# Patient Record
Sex: MALE | Race: WHITE | Employment: FULL TIME | URBAN - METROPOLITAN AREA
[De-identification: names, ages, dates, MRNs, and addresses within clinical notes are randomized per-mention and may not be internally consistent; named-entity substitution may affect disease eponyms.]

---

## 2017-01-03 ENCOUNTER — OFFICE VISIT (OUTPATIENT)
Dept: FAMILY MEDICINE CLINIC | Facility: CLINIC | Age: 62
End: 2017-01-03

## 2017-01-03 VITALS
HEART RATE: 71 BPM | WEIGHT: 292 LBS | SYSTOLIC BLOOD PRESSURE: 122 MMHG | BODY MASS INDEX: 38 KG/M2 | DIASTOLIC BLOOD PRESSURE: 74 MMHG

## 2017-01-03 DIAGNOSIS — I47.1 SVT (SUPRAVENTRICULAR TACHYCARDIA) (HCC): Primary | ICD-10-CM

## 2017-01-03 PROCEDURE — 99213 OFFICE O/P EST LOW 20 MIN: CPT | Performed by: FAMILY MEDICINE

## 2017-01-03 PROCEDURE — 99212 OFFICE O/P EST SF 10 MIN: CPT | Performed by: FAMILY MEDICINE

## 2017-01-03 NOTE — PROGRESS NOTES
Blood pressure 122/74, pulse 71, weight 292 lb (132.45 kg). Patient presents today following up for hospitalization for SVT. Reports initially he felt like he was going to faint and his watch registered a pulse of 219.   He reports that he was hospitalize

## 2017-01-06 ENCOUNTER — PRIOR ORIGINAL RECORDS (OUTPATIENT)
Dept: OTHER | Age: 62
End: 2017-01-06

## 2017-01-09 ENCOUNTER — OFFICE VISIT (OUTPATIENT)
Dept: OTOLARYNGOLOGY | Facility: CLINIC | Age: 62
End: 2017-01-09

## 2017-01-09 VITALS
TEMPERATURE: 98 F | WEIGHT: 292 LBS | BODY MASS INDEX: 38.7 KG/M2 | DIASTOLIC BLOOD PRESSURE: 70 MMHG | HEIGHT: 73 IN | SYSTOLIC BLOOD PRESSURE: 124 MMHG

## 2017-01-09 DIAGNOSIS — G47.33 OBSTRUCTIVE SLEEP APNEA SYNDROME: Primary | ICD-10-CM

## 2017-01-09 PROCEDURE — 99212 OFFICE O/P EST SF 10 MIN: CPT | Performed by: OTOLARYNGOLOGY

## 2017-01-09 PROCEDURE — 99243 OFF/OP CNSLTJ NEW/EST LOW 30: CPT | Performed by: OTOLARYNGOLOGY

## 2017-01-10 ENCOUNTER — PATIENT MESSAGE (OUTPATIENT)
Dept: FAMILY MEDICINE CLINIC | Facility: CLINIC | Age: 62
End: 2017-01-10

## 2017-01-10 DIAGNOSIS — G47.30 SLEEP APNEA, UNSPECIFIED TYPE: Primary | ICD-10-CM

## 2017-01-10 NOTE — PATIENT INSTRUCTIONS
Continuous Positive Air Pressure (CPAP)  Continuous positive air pressure (CPAP) uses gentle air pressure to hold the airway open. CPAP is often the most effective treatment for sleep apnea and severe snoring. It works very well for many people.  But keep

## 2017-01-10 NOTE — PROGRESS NOTES
Valery Marks is a 64year old male. Patient presents with:  Obstructive Sleep Apnea (TANYA): Patient here for consult    HPI:   He has had problems with loud snoring for many years.  His wife has been telling him that he skins to struggle to breathe at [facility] - Normal.   Psychiatric Normal Orientation - Oriented to time, place, person & situation. Appropriate mood and affect. Lymph Detail Normal Submental. Submandibular. Anterior cervical. Posterior cervical. Supraclavicular.    Eyes Normal Conjunctiva - Right

## 2017-01-16 NOTE — TELEPHONE ENCOUNTER
From: Ara Granda  To: Ry Sloan DO  Sent: 1/10/2017 12:01 PM CST  Subject: Visit Follow-up Question    I saw Dr. Regan Soto and he recommended going back and giving [facility] machine a chance to work. I guess will give this a try.  What would be next st

## 2017-01-19 ENCOUNTER — PRIOR ORIGINAL RECORDS (OUTPATIENT)
Dept: OTHER | Age: 62
End: 2017-01-19

## 2017-01-19 LAB
CHOLESTEROL, TOTAL: 159 MG/DL
GLUCOSE: 106 MG/DL
HDL CHOLESTEROL: 56 MG/DL
LDL CHOLESTEROL: 86 MG/DL
NON-HDL CHOLESTEROL: 103 MG/DL
TRIGLYCERIDES: 86 MG/DL

## 2017-02-08 ENCOUNTER — TELEPHONE (OUTPATIENT)
Dept: FAMILY MEDICINE CLINIC | Facility: CLINIC | Age: 62
End: 2017-02-08

## 2017-02-09 NOTE — TELEPHONE ENCOUNTER
Spk to Lisbeth at [address] [place] [address]. Leanna Sierra is requesting results from CPAP and order for machine and mask. Information was faxed.

## 2017-02-21 ENCOUNTER — PRIOR ORIGINAL RECORDS (OUTPATIENT)
Dept: OTHER | Age: 62
End: 2017-02-21

## 2017-02-21 ENCOUNTER — LAB ENCOUNTER (OUTPATIENT)
Dept: LAB | Facility: HOSPITAL | Age: 62
End: 2017-02-21
Attending: INTERNAL MEDICINE
Payer: COMMERCIAL

## 2017-02-21 DIAGNOSIS — I47.1 SVT (SUPRAVENTRICULAR TACHYCARDIA) (HCC): ICD-10-CM

## 2017-02-21 LAB
ANION GAP SERPL CALC-SCNC: 10 MMOL/L (ref 0–18)
BASOPHILS # BLD: 0.1 K/UL (ref 0–0.2)
BASOPHILS NFR BLD: 1 %
BUN SERPL-MCNC: 17 MG/DL (ref 8–20)
BUN/CREAT SERPL: 19.8 (ref 10–20)
BUN: 17 MG/DL
CALCIUM SERPL-MCNC: 9.1 MG/DL (ref 8.5–10.5)
CALCIUM: 9.1 MG/DL
CHLORIDE SERPL-SCNC: 105 MMOL/L (ref 95–110)
CHLORIDE: 105 MEQ/L
CO2 SERPL-SCNC: 22 MMOL/L (ref 22–32)
CREAT SERPL-MCNC: 0.86 MG/DL (ref 0.5–1.5)
CREATININE, SERUM: 0.86 MG/DL
EOSINOPHIL # BLD: 0.1 K/UL (ref 0–0.7)
EOSINOPHIL NFR BLD: 1 %
ERYTHROCYTE [DISTWIDTH] IN BLOOD BY AUTOMATED COUNT: 13.8 % (ref 11–15)
GLUCOSE SERPL-MCNC: 86 MG/DL (ref 70–99)
GLUCOSE: 86 MG/DL
HCT VFR BLD AUTO: 42.6 % (ref 41–52)
HEMATOCRIT: 42.6 %
HEMOGLOBIN: 14.5 G/DL
HGB BLD-MCNC: 14.5 G/DL (ref 13.5–17.5)
LYMPHOCYTES # BLD: 1.9 K/UL (ref 1–4)
LYMPHOCYTES NFR BLD: 22 %
MCH RBC QN AUTO: 29.9 PG (ref 27–32)
MCHC RBC AUTO-ENTMCNC: 34 G/DL (ref 32–37)
MCV RBC AUTO: 87.9 FL (ref 80–100)
MONOCYTES # BLD: 0.6 K/UL (ref 0–1)
MONOCYTES NFR BLD: 7 %
NEUTROPHILS # BLD AUTO: 5.8 K/UL (ref 1.8–7.7)
NEUTROPHILS NFR BLD: 69 %
OSMOLALITY UR CALC.SUM OF ELEC: 285 MOSM/KG (ref 275–295)
PLATELET # BLD AUTO: 262 K/UL (ref 140–400)
PLATELETS: 262 K/UL
PMV BLD AUTO: 8.5 FL (ref 7.4–10.3)
POTASSIUM SERPL-SCNC: 3.8 MMOL/L (ref 3.3–5.1)
POTASSIUM, SERUM: 3.8 MEQ/L
RBC # BLD AUTO: 4.85 M/UL (ref 4.5–5.9)
RED BLOOD COUNT: 4.85 X 10-6/U
SODIUM SERPL-SCNC: 137 MMOL/L (ref 136–144)
SODIUM: 137 MEQ/L
WBC # BLD AUTO: 8.4 K/UL (ref 4–11)
WHITE BLOOD COUNT: 8.4 X 10-3/U

## 2017-02-21 PROCEDURE — 85025 COMPLETE CBC W/AUTO DIFF WBC: CPT

## 2017-02-21 PROCEDURE — 36415 COLL VENOUS BLD VENIPUNCTURE: CPT

## 2017-02-21 PROCEDURE — 80048 BASIC METABOLIC PNL TOTAL CA: CPT

## 2017-02-22 RX ORDER — SODIUM CHLORIDE 9 MG/ML
INJECTION, SOLUTION INTRAVENOUS ONCE
Status: COMPLETED | OUTPATIENT
Start: 2017-02-23 | End: 2017-02-23

## 2017-02-23 ENCOUNTER — HOSPITAL ENCOUNTER (OUTPATIENT)
Dept: INTERVENTIONAL RADIOLOGY/VASCULAR | Facility: HOSPITAL | Age: 62
Discharge: HOME | End: 2017-02-23
Attending: INTERNAL MEDICINE | Admitting: INTERNAL MEDICINE
Payer: COMMERCIAL

## 2017-02-23 VITALS
DIASTOLIC BLOOD PRESSURE: 62 MMHG | RESPIRATION RATE: 21 BRPM | HEIGHT: 73 IN | HEART RATE: 88 BPM | OXYGEN SATURATION: 93 % | BODY MASS INDEX: 37.77 KG/M2 | SYSTOLIC BLOOD PRESSURE: 104 MMHG | WEIGHT: 285 LBS

## 2017-02-23 DIAGNOSIS — I47.1 SVT (SUPRAVENTRICULAR TACHYCARDIA) (HCC): ICD-10-CM

## 2017-02-23 PROCEDURE — 93609 INTRA-VNTR MAPG TCHYCAR SITE: CPT

## 2017-02-23 PROCEDURE — 4A023FZ MEASUREMENT OF CARDIAC RHYTHM, PERCUTANEOUS APPROACH: ICD-10-PCS | Performed by: INTERNAL MEDICINE

## 2017-02-23 PROCEDURE — 4A0234Z MEASUREMENT OF CARDIAC ELECTRICAL ACTIVITY, PERCUTANEOUS APPROACH: ICD-10-PCS | Performed by: INTERNAL MEDICINE

## 2017-02-23 PROCEDURE — 93653 COMPRE EP EVAL TX SVT: CPT

## 2017-02-23 PROCEDURE — 93623 PRGRMD STIMJ&PACG IV RX NFS: CPT

## 2017-02-23 PROCEDURE — 93621 COMP EP EVL L PAC&REC C SINS: CPT

## 2017-02-23 RX ORDER — SODIUM CHLORIDE 9 MG/ML
INJECTION, SOLUTION INTRAVENOUS
Status: COMPLETED
Start: 2017-02-23 | End: 2017-02-23

## 2017-02-23 RX ORDER — MIDAZOLAM HYDROCHLORIDE 1 MG/ML
INJECTION INTRAMUSCULAR; INTRAVENOUS
Status: COMPLETED
Start: 2017-02-23 | End: 2017-02-23

## 2017-02-23 RX ORDER — LIDOCAINE HYDROCHLORIDE 20 MG/ML
INJECTION, SOLUTION EPIDURAL; INFILTRATION; INTRACAUDAL; PERINEURAL
Status: COMPLETED
Start: 2017-02-23 | End: 2017-02-23

## 2017-02-23 RX ORDER — ISOPROTERENOL HYDROCHLORIDE 0.2 MG/ML
INJECTION, SOLUTION INTRAMUSCULAR; INTRAVENOUS
Status: COMPLETED
Start: 2017-02-23 | End: 2017-02-23

## 2017-02-23 RX ADMIN — SODIUM CHLORIDE: 9 INJECTION, SOLUTION INTRAVENOUS at 07:27:00

## 2017-02-23 NOTE — PROCEDURES
Procedures performed:  1. Comprehensive EP study  2. CS recording  3. EP testing following isuprel infusion. 4. Mapping, point by point  5.  RFA of SVT    : Xiomy Wilburn MD    Indication: Symptomatic SVT    Complication: none  EBL: Minimal  Sedation 270.    After isuprel washout:  Similar SCL,  SC, QRS and QT. AH 85 ms, HV 45 ms. AV node  ms    AV node /500 ms    VA BCL was 240.     SVT was not inducible post ablation, post ablation testing was performed with isuprel infusion and d

## 2017-02-27 ENCOUNTER — PATIENT MESSAGE (OUTPATIENT)
Dept: FAMILY MEDICINE CLINIC | Facility: CLINIC | Age: 62
End: 2017-02-27

## 2017-02-27 DIAGNOSIS — G47.33 OBSTRUCTIVE SLEEP APNEA SYNDROME: Primary | ICD-10-CM

## 2017-02-27 NOTE — TELEPHONE ENCOUNTER
From: Nela Sim  To: [place]ail[place] Cancer, DO  Sent: 2/27/2017 12:29 PM CST  Subject: Other    I have been using Cpap machine for a few weeks now with not so good results.  I have been communicating with consultant assigned to my case and she said I woul

## 2017-02-27 NOTE — TELEPHONE ENCOUNTER
Dr. [facility], please see message below. Would you like to see pt for follow up? Please advise. Thanks.

## 2017-02-28 NOTE — TELEPHONE ENCOUNTER
Please have patient see the pulmonology group for his sleep apnea.   I don't order the autotitrating machines, as I'm not a sleep specialist.

## 2017-03-14 ENCOUNTER — PATIENT MESSAGE (OUTPATIENT)
Dept: FAMILY MEDICINE CLINIC | Facility: CLINIC | Age: 62
End: 2017-03-14

## 2017-03-14 DIAGNOSIS — I47.1 SUPRAVENTRICULAR TACHYCARDIA (HCC): Primary | ICD-10-CM

## 2017-03-16 ENCOUNTER — PRIOR ORIGINAL RECORDS (OUTPATIENT)
Dept: OTHER | Age: 62
End: 2017-03-16

## 2017-03-16 ENCOUNTER — MYAURORA ACCOUNT LINK (OUTPATIENT)
Dept: OTHER | Age: 62
End: 2017-03-16

## 2017-03-18 NOTE — TELEPHONE ENCOUNTER
From: Bianca Adame  To: Simran Traylor DO  Sent: 3/14/2017 12:36 PM CDT  Subject: Visit Follow-up Question    I have a follow up Dr. appointment with Dr. Aquilino Walker for the procedure performed Feb 23. Do I need a referral from your office?

## 2017-03-20 ENCOUNTER — HOSPITAL ENCOUNTER (OUTPATIENT)
Age: 62
Discharge: HOME | End: 2017-03-20
Attending: EMERGENCY MEDICINE
Payer: COMMERCIAL

## 2017-03-20 ENCOUNTER — APPOINTMENT (OUTPATIENT)
Dept: GENERAL RADIOLOGY | Age: 62
End: 2017-03-20
Attending: EMERGENCY MEDICINE
Payer: COMMERCIAL

## 2017-03-20 ENCOUNTER — PATIENT MESSAGE (OUTPATIENT)
Dept: FAMILY MEDICINE CLINIC | Facility: CLINIC | Age: 62
End: 2017-03-20

## 2017-03-20 VITALS
RESPIRATION RATE: 20 BRPM | OXYGEN SATURATION: 100 % | HEIGHT: 73 IN | BODY MASS INDEX: 37.77 KG/M2 | DIASTOLIC BLOOD PRESSURE: 75 MMHG | SYSTOLIC BLOOD PRESSURE: 133 MMHG | WEIGHT: 285 LBS | HEART RATE: 91 BPM | TEMPERATURE: 100 F

## 2017-03-20 DIAGNOSIS — J40 BRONCHITIS: Primary | ICD-10-CM

## 2017-03-20 PROCEDURE — 99213 OFFICE O/P EST LOW 20 MIN: CPT

## 2017-03-20 PROCEDURE — 99214 OFFICE O/P EST MOD 30 MIN: CPT

## 2017-03-20 PROCEDURE — 71020 XR CHEST PA + LAT CHEST (CPT=71020): CPT

## 2017-03-20 RX ORDER — BENZONATATE 100 MG/1
100 CAPSULE ORAL 3 TIMES DAILY PRN
Qty: 21 CAPSULE | Refills: 0 | Status: SHIPPED | OUTPATIENT
Start: 2017-03-20 | End: 2017-03-20

## 2017-03-20 RX ORDER — BENZONATATE 100 MG/1
100 CAPSULE ORAL 3 TIMES DAILY PRN
Qty: 21 CAPSULE | Refills: 0 | Status: SHIPPED | OUTPATIENT
Start: 2017-03-20 | End: 2017-04-03

## 2017-03-20 RX ORDER — AZITHROMYCIN 250 MG/1
TABLET, FILM COATED ORAL
Qty: 1 PACKAGE | Refills: 0 | Status: SHIPPED | OUTPATIENT
Start: 2017-03-20 | End: 2017-03-27

## 2017-03-20 NOTE — ED PROVIDER NOTES
Patient presents with:  Cough/URI      HPI:     Cassy Napier is a 64year old male who presents for evaluation of a chief complaint of cough and rhinorrhea. Onset of symptoms was 3 days ago.   Patient denies fever or sputum production chest pain or back Placed This Encounter  XR CHEST PA + LAT CHEST (CPT=71020) Once  Order Specific Question: What is the Relevant Clinical Indication / Reason for Exam?  Answer: cough  azithromycin (ZITHROMAX Z-LENNY) 250 MG Oral Tab   Sig: Take as directed   Dispense:  1 Pack

## 2017-03-20 NOTE — ED NOTES
Patient c/o cough for 2 days, now with a runny nose and headache from coughing. Patient denies sore throat or fever. LS clear.

## 2017-03-27 ENCOUNTER — HOSPITAL ENCOUNTER (OUTPATIENT)
Age: 62
Discharge: HOME | End: 2017-03-27
Attending: EMERGENCY MEDICINE
Payer: COMMERCIAL

## 2017-03-27 VITALS
OXYGEN SATURATION: 100 % | RESPIRATION RATE: 16 BRPM | HEART RATE: 82 BPM | TEMPERATURE: 98 F | WEIGHT: 285 LBS | BODY MASS INDEX: 37.77 KG/M2 | DIASTOLIC BLOOD PRESSURE: 77 MMHG | HEIGHT: 73 IN | SYSTOLIC BLOOD PRESSURE: 138 MMHG

## 2017-03-27 DIAGNOSIS — J40 BRONCHITIS: Primary | ICD-10-CM

## 2017-03-27 PROCEDURE — 99214 OFFICE O/P EST MOD 30 MIN: CPT

## 2017-03-27 PROCEDURE — 99213 OFFICE O/P EST LOW 20 MIN: CPT

## 2017-03-27 RX ORDER — BENZONATATE 100 MG/1
100 CAPSULE ORAL 3 TIMES DAILY PRN
Qty: 21 CAPSULE | Refills: 0 | Status: SHIPPED | OUTPATIENT
Start: 2017-03-27 | End: 2017-04-17

## 2017-03-27 RX ORDER — PREDNISONE 20 MG/1
40 TABLET ORAL DAILY
Qty: 10 TABLET | Refills: 0 | Status: SHIPPED | OUTPATIENT
Start: 2017-03-27 | End: 2017-04-01

## 2017-03-27 NOTE — ED PROVIDER NOTES
CC:Cough    HPI:     Antonio Kumar is a 64year old male who presents for evaluation of a chief complaint of a cough. Onset of symptoms was over 1 week ago. The patient also complains of mild diarrhea.   Care prior to arrival consisted of: A course of Zit impairment      MDM/Assessment/Plan:   Orders for this encounter: At this time there did not appear to be a strong indication to give a repeat course of antibiotic.   We will plan on placing the patient a course of steroids and assess for response advised p

## 2017-03-28 NOTE — TELEPHONE ENCOUNTER
From: Mattie Goodson  To: Brenton Licona DO  Sent: 3/20/2017 12:38 PM CDT  Subject: Non-Urgent Medical Question    Probably need to make  appointment for a bad cough cold. I can't stop coughing.  Cell phone [phone]

## 2017-03-28 NOTE — TELEPHONE ENCOUNTER
Dr [facility] [place],    See pts' mychart message. This pt went to [address] [place] [address] on 3/27/17 for persisted dry cough with minimal gray phelgm and ears popping.  Dx with bronchitis and currently taking prednisone 20 mg 2 tablets daily for 5 days and benzonatate 100 mg one cap

## 2017-04-03 ENCOUNTER — OFFICE VISIT (OUTPATIENT)
Dept: FAMILY MEDICINE CLINIC | Facility: CLINIC | Age: 62
End: 2017-04-03

## 2017-04-03 ENCOUNTER — TELEPHONE (OUTPATIENT)
Dept: FAMILY MEDICINE CLINIC | Facility: CLINIC | Age: 62
End: 2017-04-03

## 2017-04-03 VITALS
HEART RATE: 73 BPM | DIASTOLIC BLOOD PRESSURE: 84 MMHG | WEIGHT: 287 LBS | BODY MASS INDEX: 38.04 KG/M2 | HEIGHT: 73 IN | RESPIRATION RATE: 20 BRPM | SYSTOLIC BLOOD PRESSURE: 135 MMHG | TEMPERATURE: 98 F

## 2017-04-03 DIAGNOSIS — M10.9 ACUTE GOUT INVOLVING TOE OF LEFT FOOT, UNSPECIFIED CAUSE: Primary | ICD-10-CM

## 2017-04-03 PROCEDURE — 99213 OFFICE O/P EST LOW 20 MIN: CPT | Performed by: FAMILY MEDICINE

## 2017-04-03 PROCEDURE — 94640 AIRWAY INHALATION TREATMENT: CPT | Performed by: FAMILY MEDICINE

## 2017-04-03 RX ORDER — ALBUTEROL SULFATE 2.5 MG/3ML
2.5 SOLUTION RESPIRATORY (INHALATION) EVERY 4 HOURS PRN
Status: DISCONTINUED | OUTPATIENT
Start: 2017-04-03 | End: 2017-04-04

## 2017-04-03 RX ORDER — OMEPRAZOLE 40 MG/1
40 CAPSULE, DELAYED RELEASE ORAL DAILY
Qty: 30 CAPSULE | Refills: 0 | Status: SHIPPED | OUTPATIENT
Start: 2017-04-03 | End: 2017-08-31

## 2017-04-03 RX ORDER — AZELASTINE HCL 205.5 UG/1
SPRAY NASAL
Qty: 1 EACH | Refills: 1 | Status: SHIPPED | OUTPATIENT
Start: 2017-04-03 | End: 2020-10-15

## 2017-04-03 RX ORDER — ALBUTEROL SULFATE 2.5 MG/3ML
2.5 SOLUTION RESPIRATORY (INHALATION) EVERY 4 HOURS PRN
Qty: 1 BOTTLE | Refills: 3 | Status: SHIPPED | OUTPATIENT
Start: 2017-04-03 | End: 2017-04-04

## 2017-04-03 RX ORDER — INDOMETHACIN 75 MG/1
75 CAPSULE, EXTENDED RELEASE ORAL 2 TIMES DAILY WITH MEALS
Qty: 60 CAPSULE | Refills: 1 | Status: SHIPPED | OUTPATIENT
Start: 2017-04-03 | End: 2017-08-31 | Stop reason: ALTCHOICE

## 2017-04-03 RX ADMIN — ALBUTEROL SULFATE 2.5 MG: 2.5 SOLUTION RESPIRATORY (INHALATION) at 15:25:00

## 2017-04-03 NOTE — PROGRESS NOTES
Where: nasal congestion and cough  Quality (Constant/Sharp?): sharp  Severity: mild mod pain  Duration: 3 weeks. Timing: constant  When does it occur: day and night.   Modifying factors:   Associated signs symptoms: cough   cxr normal  Physical exam  The

## 2017-04-03 NOTE — TELEPHONE ENCOUNTER
Pt spouse calling regarding pt getting the wrong medication at the pharmacy. Spouse state that pt was suppose to get an in hailer but receive a solution. .. please advise

## 2017-04-04 RX ORDER — ALBUTEROL SULFATE 90 UG/1
2 AEROSOL, METERED RESPIRATORY (INHALATION) EVERY 4 HOURS PRN
Qty: 1 INHALER | Refills: 0 | Status: SHIPPED | OUTPATIENT
Start: 2017-04-04 | End: 2017-08-31 | Stop reason: ALTCHOICE

## 2017-04-04 NOTE — TELEPHONE ENCOUNTER
Dr [facility], please see message below and advise. You sent albuterol sulfate (2.5 MG/3ML) 0.083% Inhalation Nebu Soln yesterday; spouse states was supposed to be an inhaler?

## 2017-08-31 ENCOUNTER — OFFICE VISIT (OUTPATIENT)
Dept: FAMILY MEDICINE CLINIC | Facility: CLINIC | Age: 62
End: 2017-08-31

## 2017-08-31 VITALS
HEIGHT: 73 IN | HEART RATE: 76 BPM | SYSTOLIC BLOOD PRESSURE: 123 MMHG | BODY MASS INDEX: 38.83 KG/M2 | WEIGHT: 293 LBS | DIASTOLIC BLOOD PRESSURE: 76 MMHG

## 2017-08-31 DIAGNOSIS — G47.33 OBSTRUCTIVE SLEEP APNEA SYNDROME: ICD-10-CM

## 2017-08-31 DIAGNOSIS — I10 ESSENTIAL HYPERTENSION WITH GOAL BLOOD PRESSURE LESS THAN 130/80: ICD-10-CM

## 2017-08-31 DIAGNOSIS — Z23 NEED FOR VACCINATION: Primary | ICD-10-CM

## 2017-08-31 DIAGNOSIS — Z00.00 ANNUAL PHYSICAL EXAM: ICD-10-CM

## 2017-08-31 DIAGNOSIS — M10.9 ACUTE GOUT INVOLVING TOE OF LEFT FOOT, UNSPECIFIED CAUSE: ICD-10-CM

## 2017-08-31 DIAGNOSIS — Z86.79 HISTORY OF CARDIAC ARRHYTHMIA: ICD-10-CM

## 2017-08-31 DIAGNOSIS — E78.5 HYPERLIPIDEMIA, UNSPECIFIED HYPERLIPIDEMIA TYPE: ICD-10-CM

## 2017-08-31 PROCEDURE — 99396 PREV VISIT EST AGE 40-64: CPT | Performed by: FAMILY MEDICINE

## 2017-08-31 PROCEDURE — 90715 TDAP VACCINE 7 YRS/> IM: CPT | Performed by: FAMILY MEDICINE

## 2017-08-31 PROCEDURE — 90471 IMMUNIZATION ADMIN: CPT | Performed by: FAMILY MEDICINE

## 2017-08-31 NOTE — PROGRESS NOTES
Physical  india claustrophobic with mask      Patient's past medical surgical family social history was reviewed.     Review of Systems  Allergic: no environmental allergies or food allergies  Cardiovascular: no chest pain, irregular heartbeat, or palpitation DIPHTHERIA TOXOIDS AND ACELLULAR PERTUSIS VACCINE (TDAP), >7 YEARS, IM USE  - ZOSTER VACC LIVE SUBQ NJX    4. Hyperlipidemia, unspecified hyperlipidemia type  stable    5. Acute gout involving toe of left foot, unspecified cause  resolved    6.  Obstructive

## 2018-02-02 RX ORDER — TELMISARTAN 40 MG/1
TABLET ORAL
Qty: 90 TABLET | Refills: 0 | Status: SHIPPED | OUTPATIENT
Start: 2018-02-02 | End: 2018-05-02

## 2018-02-02 NOTE — TELEPHONE ENCOUNTER
Refilled per written protocol.     Hypertensive Medications  Protocol Criteria:  · Appointment scheduled in the past 6 months or in the next 3 months  · BMP or CMP in the past 12 months  · Creatinine result < 2  Recent Outpatient Visits            5 months

## 2018-02-23 ENCOUNTER — NURSE TRIAGE (OUTPATIENT)
Dept: OTHER | Age: 63
End: 2018-02-23

## 2018-02-23 NOTE — TELEPHONE ENCOUNTER
Spoke with patient and informed him of provider's orders and plan of care. Patient was also instructed to see medical care in [place] if his symptoms get worse. Patient voiced understanding and agreed with plan of care.

## 2018-02-23 NOTE — TELEPHONE ENCOUNTER
Action Requested: Summary for Provider     []  Critical Lab, Recommendations Needed  [x] Need Additional Advice  []   FYI    []   Need Orders  [x] Need Medications Sent to Pharmacy  []  Other     SUMMARY: Dr. [facility]: please advise, patient request tessalon pe

## 2018-02-27 ENCOUNTER — OFFICE VISIT (OUTPATIENT)
Dept: FAMILY MEDICINE CLINIC | Facility: CLINIC | Age: 63
End: 2018-02-27

## 2018-02-27 VITALS
TEMPERATURE: 98 F | HEIGHT: 73 IN | WEIGHT: 290 LBS | SYSTOLIC BLOOD PRESSURE: 138 MMHG | BODY MASS INDEX: 38.43 KG/M2 | HEART RATE: 75 BPM | DIASTOLIC BLOOD PRESSURE: 89 MMHG

## 2018-02-27 DIAGNOSIS — R05.9 COUGH: Primary | ICD-10-CM

## 2018-02-27 DIAGNOSIS — K21.9 CHRONIC GERD: ICD-10-CM

## 2018-02-27 PROCEDURE — 99213 OFFICE O/P EST LOW 20 MIN: CPT | Performed by: FAMILY MEDICINE

## 2018-02-27 PROCEDURE — 99212 OFFICE O/P EST SF 10 MIN: CPT | Performed by: FAMILY MEDICINE

## 2018-02-27 RX ORDER — LANSOPRAZOLE 30 MG/1
30 CAPSULE, DELAYED RELEASE ORAL
Refills: 0 | COMMUNITY
Start: 2018-02-19 | End: 2020-10-15 | Stop reason: ALTCHOICE

## 2018-02-27 RX ORDER — FLUTICASONE PROPIONATE 50 MCG
2 SPRAY, SUSPENSION (ML) NASAL DAILY
Qty: 1 BOTTLE | Refills: 3 | Status: SHIPPED | OUTPATIENT
Start: 2018-02-27 | End: 2019-02-22

## 2018-02-27 RX ORDER — CODEINE PHOSPHATE AND GUAIFENESIN 10; 100 MG/5ML; MG/5ML
SOLUTION ORAL
Refills: 0 | COMMUNITY
Start: 2018-02-19 | End: 2020-02-10 | Stop reason: ALTCHOICE

## 2018-02-27 NOTE — PROGRESS NOTES
Blood pressure 138/89, pulse 75, temperature 98.4 °F (36.9 °C), temperature source Oral, height 6' 1\" (1.854 m), weight 290 lb (131.5 kg). 10 day history of cough that is nocturnal clear nasal congestion some green. Coughing of green to clear phlegm.

## 2018-05-02 NOTE — TELEPHONE ENCOUNTER
Please review and advise regarding pended refill request as unable to refill per protocol since no recent relevant labs.     Hypertensive Medications  Protocol Criteria:  · Appointment scheduled in the past 6 months or in the next 3 months  · BMP or CMP in

## 2018-05-03 RX ORDER — TELMISARTAN 40 MG/1
TABLET ORAL
Qty: 90 TABLET | Refills: 11 | Status: SHIPPED | OUTPATIENT
Start: 2018-05-03 | End: 2019-05-30

## 2018-05-14 ENCOUNTER — APPOINTMENT (OUTPATIENT)
Dept: GENERAL RADIOLOGY | Age: 63
End: 2018-05-14
Attending: PHYSICIAN ASSISTANT
Payer: COMMERCIAL

## 2018-05-14 ENCOUNTER — HOSPITAL ENCOUNTER (OUTPATIENT)
Age: 63
Discharge: HOME | End: 2018-05-14
Payer: COMMERCIAL

## 2018-05-14 VITALS
WEIGHT: 290 LBS | DIASTOLIC BLOOD PRESSURE: 79 MMHG | OXYGEN SATURATION: 100 % | SYSTOLIC BLOOD PRESSURE: 131 MMHG | HEART RATE: 65 BPM | HEIGHT: 73 IN | BODY MASS INDEX: 38.43 KG/M2 | RESPIRATION RATE: 20 BRPM | TEMPERATURE: 98 F

## 2018-05-14 DIAGNOSIS — J40 BRONCHITIS: Primary | ICD-10-CM

## 2018-05-14 PROCEDURE — 99214 OFFICE O/P EST MOD 30 MIN: CPT

## 2018-05-14 PROCEDURE — 71046 X-RAY EXAM CHEST 2 VIEWS: CPT | Performed by: PHYSICIAN ASSISTANT

## 2018-05-14 PROCEDURE — 99213 OFFICE O/P EST LOW 20 MIN: CPT

## 2018-05-14 RX ORDER — BENZONATATE 200 MG/1
200 CAPSULE ORAL 3 TIMES DAILY PRN
Qty: 21 CAPSULE | Refills: 0 | Status: SHIPPED | OUTPATIENT
Start: 2018-05-14 | End: 2020-02-10 | Stop reason: ALTCHOICE

## 2018-05-14 RX ORDER — AZITHROMYCIN 250 MG/1
TABLET, FILM COATED ORAL
Qty: 1 PACKAGE | Refills: 0 | Status: SHIPPED | OUTPATIENT
Start: 2018-05-14 | End: 2018-05-19

## 2018-05-14 NOTE — ED PROVIDER NOTES
[facility]    History   Patient presents with:  Cough/URI    Stated Complaint: COUGH/CONGESTION    HPI    Patient is a 70-year-old obese male with a history of hypertension who presents for evaluation of (Oral)   Resp 20   Ht 185.4 cm (6' 1\")   Wt 131.5 kg   SpO2 100%   BMI 38.26 kg/m²         Physical Exam   Constitutional: He is oriented to person, place, and time. He appears well-developed and well-nourished.    HENT:   Head: Normocephalic and atraumati persist        Medications Prescribed:  Current Discharge Medication List    START taking these medications    azithromycin (ZITHROMAX Z-LENNY) 250 MG Oral Tab  500 mg once followed by 250 mg daily x 4 days  Qty: 1 Package Refills: 0    benzonatate 200 MG Or

## 2018-10-08 ENCOUNTER — HOSPITAL ENCOUNTER (OUTPATIENT)
Age: 63
Discharge: HOME | End: 2018-10-08
Payer: COMMERCIAL

## 2018-10-08 VITALS
WEIGHT: 280 LBS | OXYGEN SATURATION: 97 % | SYSTOLIC BLOOD PRESSURE: 142 MMHG | HEART RATE: 68 BPM | TEMPERATURE: 98 F | DIASTOLIC BLOOD PRESSURE: 80 MMHG | HEIGHT: 73 IN | BODY MASS INDEX: 37.11 KG/M2 | RESPIRATION RATE: 18 BRPM

## 2018-10-08 DIAGNOSIS — H10.31 ACUTE CONJUNCTIVITIS OF RIGHT EYE, UNSPECIFIED ACUTE CONJUNCTIVITIS TYPE: Primary | ICD-10-CM

## 2018-10-08 PROCEDURE — 90471 IMMUNIZATION ADMIN: CPT

## 2018-10-08 PROCEDURE — 90686 IIV4 VACC NO PRSV 0.5 ML IM: CPT

## 2018-10-08 PROCEDURE — 99214 OFFICE O/P EST MOD 30 MIN: CPT

## 2018-10-08 PROCEDURE — 99213 OFFICE O/P EST LOW 20 MIN: CPT

## 2018-10-08 RX ORDER — ERYTHROMYCIN 5 MG/G
1 OINTMENT OPHTHALMIC EVERY 6 HOURS
Qty: 1 G | Refills: 0 | Status: SHIPPED | OUTPATIENT
Start: 2018-10-08 | End: 2018-10-15

## 2018-10-08 NOTE — ED PROVIDER NOTES
Patient Seen in: [facility]    History   Patient presents with:   Eye Visual Problem (opthalmic)    Stated Complaint: eye problem    HPI    Patient is a 19-year-old male who presents for evaluation of right eye redness and Pulse 68   Temp 98.2 °F (36.8 °C) (Oral)   Resp 18   Ht 185.4 cm (6' 1\")   Wt 127 kg   SpO2 97%   BMI 36.94 kg/m²     Right Eye Chart Acuity: 20/30, Uncorrected  Left Eye Chart Acuity: 20/30, Uncorrected    Physical Exam   Constitutional: He is oriented worsen. All questions answered prior to discharge. Patient understands and agrees with plan and disposition. Given good ER precautions for any worsening symptoms.         Disposition and Plan     Clinical Impression:  Acute conjunctivitis of right eye, u

## 2019-03-01 VITALS
HEART RATE: 91 BPM | HEIGHT: 73 IN | DIASTOLIC BLOOD PRESSURE: 72 MMHG | OXYGEN SATURATION: 97 % | SYSTOLIC BLOOD PRESSURE: 134 MMHG | BODY MASS INDEX: 38.04 KG/M2 | RESPIRATION RATE: 8 BRPM | WEIGHT: 287 LBS

## 2019-03-01 VITALS
SYSTOLIC BLOOD PRESSURE: 130 MMHG | OXYGEN SATURATION: 97 % | HEART RATE: 84 BPM | HEIGHT: 73 IN | BODY MASS INDEX: 38.7 KG/M2 | DIASTOLIC BLOOD PRESSURE: 72 MMHG | WEIGHT: 292 LBS | RESPIRATION RATE: 8 BRPM

## 2019-05-30 RX ORDER — TELMISARTAN 40 MG/1
TABLET ORAL
Qty: 90 TABLET | Refills: 1 | Status: SHIPPED | OUTPATIENT
Start: 2019-05-30 | End: 2019-06-13

## 2019-05-30 NOTE — TELEPHONE ENCOUNTER
Please review; protocol failed.     Requested Prescriptions     Pending Prescriptions Disp Refills   • TELMISARTAN 40 MG Oral Tab [Pharmacy Med Name: TELMISARTAN 40MG TABLETS] 90 tablet 0     Sig: TAKE 1 TABLET(40 MG) BY MOUTH DAILY         Recent Visits  D

## 2019-06-13 ENCOUNTER — OFFICE VISIT (OUTPATIENT)
Dept: FAMILY MEDICINE CLINIC | Facility: CLINIC | Age: 64
End: 2019-06-13

## 2019-06-13 VITALS
RESPIRATION RATE: 20 BRPM | HEIGHT: 73 IN | BODY MASS INDEX: 38.7 KG/M2 | SYSTOLIC BLOOD PRESSURE: 133 MMHG | HEART RATE: 84 BPM | DIASTOLIC BLOOD PRESSURE: 82 MMHG | WEIGHT: 292 LBS

## 2019-06-13 DIAGNOSIS — Z86.79 H/O SUPRAVENTRICULAR TACHYCARDIA: ICD-10-CM

## 2019-06-13 DIAGNOSIS — E78.00 HIGH CHOLESTEROL: ICD-10-CM

## 2019-06-13 DIAGNOSIS — L57.0 SOLAR KERATOSIS: ICD-10-CM

## 2019-06-13 DIAGNOSIS — I10 ESSENTIAL HYPERTENSION WITH GOAL BLOOD PRESSURE LESS THAN 130/80: ICD-10-CM

## 2019-06-13 DIAGNOSIS — M10.9 GOUT, UNSPECIFIED CAUSE, UNSPECIFIED CHRONICITY, UNSPECIFIED SITE: ICD-10-CM

## 2019-06-13 DIAGNOSIS — D18.01 CAPILLARY HEMANGIOMA OF SKIN: ICD-10-CM

## 2019-06-13 DIAGNOSIS — E66.01 CLASS 2 SEVERE OBESITY DUE TO EXCESS CALORIES WITH SERIOUS COMORBIDITY AND BODY MASS INDEX (BMI) OF 38.0 TO 38.9 IN ADULT (HCC): ICD-10-CM

## 2019-06-13 DIAGNOSIS — Z00.00 ANNUAL PHYSICAL EXAM: Primary | ICD-10-CM

## 2019-06-13 DIAGNOSIS — Z12.5 SCREENING FOR PROSTATE CANCER: ICD-10-CM

## 2019-06-13 DIAGNOSIS — Z12.11 SCREEN FOR COLON CANCER: ICD-10-CM

## 2019-06-13 DIAGNOSIS — K57.90 DIVERTICULOSIS: ICD-10-CM

## 2019-06-13 DIAGNOSIS — G47.33 OSA (OBSTRUCTIVE SLEEP APNEA): ICD-10-CM

## 2019-06-13 PROCEDURE — 99396 PREV VISIT EST AGE 40-64: CPT | Performed by: FAMILY MEDICINE

## 2019-06-13 RX ORDER — TELMISARTAN 80 MG/1
80 TABLET ORAL DAILY
Qty: 90 TABLET | Refills: 3 | Status: SHIPPED | OUTPATIENT
Start: 2019-06-13 | End: 2020-10-12

## 2019-06-13 RX ORDER — ROSUVASTATIN CALCIUM 5 MG/1
5 TABLET, COATED ORAL NIGHTLY
Qty: 90 TABLET | Refills: 3 | Status: SHIPPED | OUTPATIENT
Start: 2019-06-13 | End: 2019-07-15

## 2019-06-13 NOTE — PROGRESS NOTES
contolled svt  No problems  REASON FOR VISIT:    Georgia Walters is a 59year old male who presents for an [address]       Patient Active Problem List:     Arthralgia of left hip     SVT (supraventricular tachycardia) (HCC)     Hypokalemia Screen pts at high risk plus screen one time for adults born [address] No results found for: HCVAB   Tuberculosis Screen If high risk No components found for: [address] Internal Lab or Procedure     Annual Monitoring of heart ablation       Family History   Problem Relation Age of Onset   • Cancer Father         Brain   • Cancer Daughter         sarcoma      SOCIAL HISTORY:   Social History    Tobacco Use      Smoking status: Never Smoker      Smokeless tobacco: Never Use CONDITIONS:   Joaquim Motley is a 59year old male who presents for an [address]. PLAN SUMMARY:   1. Annual physical exam  Stable  Colon due 2024  - Telmisartan 80 MG Oral Tab; Take 1 tablet (80 mg total) by mouth daily.   Dispense: 90 Influenza Annually   Pneumococcal if high risk   Td/Tdap once then every 10 years   HPV Males 11-21   Zoster (Shingles) 60 and older: one dose   Varicella 2 doses if not immune   MMR 1-2 doses if born after 1956 and not immune

## 2019-07-11 ENCOUNTER — OFFICE VISIT (OUTPATIENT)
Dept: DERMATOLOGY CLINIC | Facility: CLINIC | Age: 64
End: 2019-07-11

## 2019-07-11 DIAGNOSIS — D22.9 MULTIPLE NEVI: Primary | ICD-10-CM

## 2019-07-11 DIAGNOSIS — D23.4 BENIGN NEOPLASM OF SCALP AND SKIN OF NECK: ICD-10-CM

## 2019-07-11 DIAGNOSIS — D23.30 BENIGN NEOPLASM OF SKIN OF FACE: ICD-10-CM

## 2019-07-11 DIAGNOSIS — D23.70 BENIGN NEOPLASM OF SKIN OF LOWER LIMB, INCLUDING HIP, UNSPECIFIED LATERALITY: ICD-10-CM

## 2019-07-11 DIAGNOSIS — D23.60 BENIGN NEOPLASM OF SKIN OF UPPER LIMB, INCLUDING SHOULDER, UNSPECIFIED LATERALITY: ICD-10-CM

## 2019-07-11 DIAGNOSIS — L82.1 SEBORRHEIC KERATOSES: ICD-10-CM

## 2019-07-11 DIAGNOSIS — D23.5 BENIGN NEOPLASM OF SKIN OF TRUNK, EXCEPT SCROTUM: ICD-10-CM

## 2019-07-11 PROCEDURE — 99203 OFFICE O/P NEW LOW 30 MIN: CPT | Performed by: DERMATOLOGY

## 2019-07-12 ENCOUNTER — LAB ENCOUNTER (OUTPATIENT)
Dept: LAB | Age: 64
End: 2019-07-12
Attending: FAMILY MEDICINE
Payer: COMMERCIAL

## 2019-07-12 DIAGNOSIS — Z12.5 SCREENING FOR PROSTATE CANCER: ICD-10-CM

## 2019-07-12 DIAGNOSIS — Z86.79 H/O SUPRAVENTRICULAR TACHYCARDIA: Primary | ICD-10-CM

## 2019-07-12 DIAGNOSIS — I10 ESSENTIAL HYPERTENSION WITH GOAL BLOOD PRESSURE LESS THAN 130/80: ICD-10-CM

## 2019-07-12 DIAGNOSIS — Z00.00 ANNUAL PHYSICAL EXAM: ICD-10-CM

## 2019-07-12 DIAGNOSIS — M10.9 GOUT, UNSPECIFIED CAUSE, UNSPECIFIED CHRONICITY, UNSPECIFIED SITE: ICD-10-CM

## 2019-07-12 LAB
ABSOLUTE IMMATURE GRANULOCYTES (OFFPRE24): NORMAL
ALBUMIN SERPL-MCNC: 3.9 G/DL (ref 3.4–5)
ALBUMIN SERPL-MCNC: NORMAL G/DL
ALBUMIN/GLOB SERPL: 1.3 {RATIO} (ref 1–2)
ALBUMIN/GLOB SERPL: NORMAL {RATIO}
ALP LIVER SERPL-CCNC: 64 U/L (ref 45–117)
ALP SERPL-CCNC: NORMAL U/L
ALT SERPL-CCNC: 34 U/L
ALT SERPL-CCNC: 34 U/L (ref 16–61)
ANION GAP SERPL CALC-SCNC: 7 MMOL/L (ref 0–18)
ANION GAP SERPL CALC-SCNC: NORMAL MMOL/L
AST SERPL-CCNC: 14 U/L
AST SERPL-CCNC: 14 U/L (ref 15–37)
BASO+EOS+MONOS # BLD: NORMAL 10*3/UL
BASO+EOS+MONOS NFR BLD: NORMAL %
BASOPHILS # BLD AUTO: 0.06 X10(3) UL (ref 0–0.2)
BASOPHILS # BLD: NORMAL 10*3/UL
BASOPHILS NFR BLD AUTO: 1.1 %
BASOPHILS NFR BLD: NORMAL %
BILIRUB SERPL-MCNC: 0.6 MG/DL (ref 0.1–2)
BILIRUB SERPL-MCNC: NORMAL MG/DL
BUN BLD-MCNC: 18 MG/DL (ref 7–18)
BUN SERPL-MCNC: 18 MG/DL
BUN/CREAT SERPL: 21.7
BUN/CREAT SERPL: 21.7 (ref 10–20)
CALCIUM BLD-MCNC: 8.8 MG/DL (ref 8.5–10.1)
CALCIUM SERPL-MCNC: 8.8 MG/DL
CHLORIDE SERPL-SCNC: 108 MMOL/L
CHLORIDE SERPL-SCNC: 108 MMOL/L (ref 98–112)
CHOLEST SERPL-MCNC: 196 MG/DL
CHOLEST SMN-MCNC: 196 MG/DL (ref ?–200)
CHOLEST/HDLC SERPL: NORMAL {RATIO}
CO2 SERPL-SCNC: 25 MMOL/L
CO2 SERPL-SCNC: 25 MMOL/L (ref 21–32)
COMPLEXED PSA SERPL-MCNC: 1.94 NG/ML (ref ?–4)
CREAT BLD-MCNC: 0.83 MG/DL (ref 0.7–1.3)
CREAT SERPL-MCNC: 0.83 MG/DL
DEPRECATED RDW RBC AUTO: 44.6 FL (ref 35.1–46.3)
DIFFERENTIAL METHOD BLD: NORMAL
EOSINOPHIL # BLD AUTO: 0.12 X10(3) UL (ref 0–0.7)
EOSINOPHIL # BLD: NORMAL 10*3/UL
EOSINOPHIL NFR BLD AUTO: 2.3 %
EOSINOPHIL NFR BLD: NORMAL %
ERYTHROCYTE [DISTWIDTH] IN BLOOD BY AUTOMATED COUNT: 13.2 % (ref 11–15)
ERYTHROCYTE [DISTWIDTH] IN BLOOD: NORMAL %
GLOBULIN PLAS-MCNC: 3.1 G/DL (ref 2.8–4.4)
GLOBULIN SER-MCNC: NORMAL G/DL
GLUCOSE BLD-MCNC: 85 MG/DL (ref 70–99)
GLUCOSE SERPL-MCNC: 85 MG/DL
HCT VFR BLD AUTO: 44.4 % (ref 39–53)
HCT VFR BLD CALC: 44.4 %
HDLC SERPL-MCNC: 32 MG/DL
HDLC SERPL-MCNC: 32 MG/DL (ref 40–59)
HGB BLD-MCNC: 15.1 G/DL
HGB BLD-MCNC: 15.1 G/DL (ref 13–17.5)
IMM GRANULOCYTES # BLD AUTO: 0.03 X10(3) UL (ref 0–1)
IMM GRANULOCYTES NFR BLD: 0.6 %
IMMATURE GRANULOCYTES (OFFPRE25): NORMAL
LDLC SERPL CALC-MCNC: 126 MG/DL
LDLC SERPL CALC-MCNC: 126 MG/DL (ref ?–100)
LENGTH OF FAST TIME PATIENT: NORMAL H
LENGTH OF FAST TIME PATIENT: NORMAL H
LYMPHOCYTES # BLD AUTO: 1.59 X10(3) UL (ref 1–4)
LYMPHOCYTES # BLD: NORMAL 10*3/UL
LYMPHOCYTES NFR BLD AUTO: 30.3 %
LYMPHOCYTES NFR BLD: NORMAL %
M PROTEIN MFR SERPL ELPH: 7 G/DL (ref 6.4–8.2)
MCH RBC QN AUTO: 31.1 PG (ref 26–34)
MCH RBC QN AUTO: NORMAL PG
MCHC RBC AUTO-ENTMCNC: 34 G/DL (ref 31–37)
MCHC RBC AUTO-ENTMCNC: NORMAL G/DL
MCV RBC AUTO: 91.5 FL (ref 80–100)
MCV RBC AUTO: NORMAL FL
MONOCYTES # BLD AUTO: 0.4 X10(3) UL (ref 0.1–1)
MONOCYTES # BLD: NORMAL 10*3/UL
MONOCYTES NFR BLD AUTO: 7.6 %
MONOCYTES NFR BLD: NORMAL %
MPV (OFFPRE2): NORMAL
NEUTROPHILS # BLD AUTO: 3.04 X10 (3) UL (ref 1.5–7.7)
NEUTROPHILS # BLD AUTO: 3.04 X10(3) UL (ref 1.5–7.7)
NEUTROPHILS # BLD: NORMAL 10*3/UL
NEUTROPHILS NFR BLD AUTO: 58.1 %
NEUTROPHILS NFR BLD: NORMAL %
NONHDLC SERPL-MCNC: 164 MG/DL
NONHDLC SERPL-MCNC: 164 MG/DL (ref ?–130)
NRBC BLD MANUAL-RTO: NORMAL %
OSMOLALITY SERPL CALC.SUM OF ELEC: 291 MOSM/KG (ref 275–295)
PATIENT FASTING: YES
PATIENT FASTING: YES
PLAT MORPH BLD: NORMAL
PLATELET # BLD AUTO: 252 10(3)UL (ref 150–450)
PLATELET # BLD: 252 10*3/UL
POTASSIUM SERPL-SCNC: 4.1 MMOL/L
POTASSIUM SERPL-SCNC: 4.1 MMOL/L (ref 3.5–5.1)
PROT SERPL-MCNC: NORMAL G/DL
RBC # BLD AUTO: 4.85 X10(6)UL (ref 4.3–5.7)
RBC # BLD: 4.85 10*6/UL
RBC MORPH BLD: NORMAL
SODIUM SERPL-SCNC: 140 MMOL/L
SODIUM SERPL-SCNC: 140 MMOL/L (ref 136–145)
TRIGL SERPL-MCNC: 189 MG/DL
TRIGL SERPL-MCNC: 189 MG/DL (ref 30–149)
TSH SERPL-ACNC: 1.39 M[IU]/L
TSI SER-ACNC: 1.39 MIU/ML (ref 0.36–3.74)
URATE SERPL-MCNC: 6.9 MG/DL (ref 3.5–7.2)
VLDLC SERPL CALC-MCNC: 38 MG/DL
VLDLC SERPL CALC-MCNC: 38 MG/DL (ref 0–30)
WBC # BLD AUTO: 5.2 X10(3) UL (ref 4–11)
WBC # BLD: 5.2 10*3/UL
WBC MORPH BLD: NORMAL

## 2019-07-12 PROCEDURE — 93005 ELECTROCARDIOGRAM TRACING: CPT

## 2019-07-12 PROCEDURE — 84550 ASSAY OF BLOOD/URIC ACID: CPT

## 2019-07-12 PROCEDURE — 85025 COMPLETE CBC W/AUTO DIFF WBC: CPT

## 2019-07-12 PROCEDURE — 80061 LIPID PANEL: CPT

## 2019-07-12 PROCEDURE — 84443 ASSAY THYROID STIM HORMONE: CPT

## 2019-07-12 PROCEDURE — 80053 COMPREHEN METABOLIC PANEL: CPT

## 2019-07-12 PROCEDURE — 36415 COLL VENOUS BLD VENIPUNCTURE: CPT

## 2019-07-12 PROCEDURE — 93010 ELECTROCARDIOGRAM REPORT: CPT | Performed by: FAMILY MEDICINE

## 2019-07-15 RX ORDER — ROSUVASTATIN CALCIUM 20 MG/1
20 TABLET, COATED ORAL NIGHTLY
Qty: 90 TABLET | Refills: 0 | Status: SHIPPED | OUTPATIENT
Start: 2019-07-15 | End: 2019-09-13

## 2019-07-22 NOTE — PROGRESS NOTES
Shola Hendrix is a 59year old male. HPI:     CC:  Patient presents with:  Derm Problem: New pt. pt presenting today with upper body check. Family HX of SCC,Melanoma(father and brother).         Allergies:  Pravastatin    HISTORY:    Past Medical Histo tachycardia) ([facility] [address]    • Unspecified essential hypertension      Past Surgical History:   Procedure Laterality Date   • OTHER SURGICAL HISTORY  02/17    heart ablation      Social History    Socioeconomic History      Marital status:       Spouse nam for this visit. HPI:    Patient presents with:  Derm Problem: New pt. pt presenting today with upper body check. Family HX of SCC,Melanoma(father and brother). Family history of skin cancer SCC and melanoma in father and brother.   Patient has not had a limb, including hip, unspecified laterality  Benign neoplasm of skin of trunk, except scrotum  Benign neoplasm of skin of upper limb, including shoulder, unspecified laterality  Seborrheic keratoses    See details on map.       Remarkable for:    Medium bro

## 2019-08-02 ENCOUNTER — APPOINTMENT (OUTPATIENT)
Dept: CARDIOLOGY | Age: 64
End: 2019-08-02

## 2019-08-06 ENCOUNTER — CLINICAL ABSTRACT (OUTPATIENT)
Dept: CARDIOLOGY | Age: 64
End: 2019-08-06

## 2019-08-06 RX ORDER — LANSOPRAZOLE 30 MG/1
30 CAPSULE, DELAYED RELEASE ORAL DAILY
COMMUNITY
Start: 2018-02-19 | End: 2022-03-11 | Stop reason: ALTCHOICE

## 2019-08-06 RX ORDER — FLUTICASONE PROPIONATE AND SALMETEROL XINAFOATE 230; 21 UG/1; UG/1
2 AEROSOL, METERED RESPIRATORY (INHALATION) 2 TIMES DAILY
COMMUNITY
Start: 2018-02-27

## 2019-08-06 RX ORDER — AZELASTINE HCL 205.5 UG/1
2 SPRAY NASAL 2 TIMES DAILY
COMMUNITY
Start: 2017-04-03

## 2019-08-06 RX ORDER — TELMISARTAN 80 MG/1
80 TABLET ORAL DAILY
COMMUNITY
Start: 2019-06-13

## 2019-08-06 RX ORDER — ROSUVASTATIN CALCIUM 20 MG/1
20 TABLET, COATED ORAL NIGHTLY
COMMUNITY
Start: 2019-07-15 | End: 2022-03-11 | Stop reason: ALTCHOICE

## 2019-08-06 RX ORDER — ASPIRIN 81 MG/1
81 TABLET, CHEWABLE ORAL DAILY
COMMUNITY
Start: 2016-12-24 | End: 2022-03-11 | Stop reason: ALTCHOICE

## 2019-08-07 ENCOUNTER — PATIENT MESSAGE (OUTPATIENT)
Dept: FAMILY MEDICINE CLINIC | Facility: CLINIC | Age: 64
End: 2019-08-07

## 2019-08-07 ENCOUNTER — NURSE TRIAGE (OUTPATIENT)
Dept: OTHER | Age: 64
End: 2019-08-07

## 2019-08-07 ENCOUNTER — HOSPITAL ENCOUNTER (OUTPATIENT)
Age: 64
Discharge: HOME | End: 2019-08-07
Payer: COMMERCIAL

## 2019-08-07 ENCOUNTER — OFFICE VISIT (OUTPATIENT)
Dept: CARDIOLOGY | Age: 64
End: 2019-08-07

## 2019-08-07 VITALS
RESPIRATION RATE: 20 BRPM | OXYGEN SATURATION: 98 % | HEIGHT: 73 IN | DIASTOLIC BLOOD PRESSURE: 75 MMHG | TEMPERATURE: 99 F | WEIGHT: 295 LBS | SYSTOLIC BLOOD PRESSURE: 119 MMHG | HEART RATE: 88 BPM | BODY MASS INDEX: 39.1 KG/M2

## 2019-08-07 VITALS
OXYGEN SATURATION: 96 % | SYSTOLIC BLOOD PRESSURE: 116 MMHG | BODY MASS INDEX: 39.23 KG/M2 | DIASTOLIC BLOOD PRESSURE: 64 MMHG | WEIGHT: 296 LBS | HEIGHT: 73 IN | HEART RATE: 69 BPM

## 2019-08-07 DIAGNOSIS — I47.10 PSVT (PAROXYSMAL SUPRAVENTRICULAR TACHYCARDIA): ICD-10-CM

## 2019-08-07 DIAGNOSIS — I10 ESSENTIAL HYPERTENSION: Primary | ICD-10-CM

## 2019-08-07 DIAGNOSIS — H65.91 RIGHT NON-SUPPURATIVE OTITIS MEDIA: Primary | ICD-10-CM

## 2019-08-07 DIAGNOSIS — H60.501 ACUTE OTITIS EXTERNA OF RIGHT EAR, UNSPECIFIED TYPE: ICD-10-CM

## 2019-08-07 PROCEDURE — 3078F DIAST BP <80 MM HG: CPT | Performed by: INTERNAL MEDICINE

## 2019-08-07 PROCEDURE — 99214 OFFICE O/P EST MOD 30 MIN: CPT

## 2019-08-07 PROCEDURE — 99214 OFFICE O/P EST MOD 30 MIN: CPT | Performed by: INTERNAL MEDICINE

## 2019-08-07 PROCEDURE — 93000 ELECTROCARDIOGRAM COMPLETE: CPT | Performed by: INTERNAL MEDICINE

## 2019-08-07 PROCEDURE — 3074F SYST BP LT 130 MM HG: CPT | Performed by: INTERNAL MEDICINE

## 2019-08-07 PROCEDURE — 99213 OFFICE O/P EST LOW 20 MIN: CPT

## 2019-08-07 RX ORDER — AMOXICILLIN 875 MG/1
875 TABLET, COATED ORAL 2 TIMES DAILY
Qty: 20 TABLET | Refills: 0 | Status: SHIPPED | OUTPATIENT
Start: 2019-08-07 | End: 2019-08-17

## 2019-08-07 RX ORDER — OFLOXACIN 3 MG/ML
10 SOLUTION AURICULAR (OTIC) DAILY
Qty: 1 BOTTLE | Refills: 0 | Status: SHIPPED | OUTPATIENT
Start: 2019-08-07 | End: 2019-08-14

## 2019-08-07 ASSESSMENT — PATIENT HEALTH QUESTIONNAIRE - PHQ9
SUM OF ALL RESPONSES TO PHQ9 QUESTIONS 1 AND 2: 0
SUM OF ALL RESPONSES TO PHQ9 QUESTIONS 1 AND 2: 0
1. LITTLE INTEREST OR PLEASURE IN DOING THINGS: NOT AT ALL
2. FEELING DOWN, DEPRESSED OR HOPELESS: NOT AT ALL

## 2019-08-07 NOTE — TELEPHONE ENCOUNTER
Action Requested: Summary for Provider     []  Critical Lab, Recommendations Needed  [] Need Additional Advice  []   FYI    []   Need Orders  [] Need Medications Sent to Pharmacy  []  Other     SUMMARY: OV scheduled with Dr Bolivar Sheehan for further evaluatio

## 2019-08-07 NOTE — TELEPHONE ENCOUNTER
----- Message from Georgia Walters sent at 8/7/2019  7:13 AM CDT -----  Regarding: Non-Urgent Medical Question  Contact: [phone]  I’m having a problem with right inner ear having fluid causing hearing problems. Do I need to make an appointment.  Had p

## 2019-08-07 NOTE — TELEPHONE ENCOUNTER
From: Joanna Matthews  To: Izabella Montes De Oca DO  Sent: 8/7/2019 7:13 AM CDT  Subject: Non-Urgent Medical Question    I’m having a problem with right inner ear having fluid causing hearing problems. Do I need to make an appointment.  Had problem for a cou

## 2019-08-07 NOTE — ED INITIAL ASSESSMENT (HPI)
Pt to IC with pain and \"clogged sensation\" in right ear x 2 weeks. Denies fever. States pain worse today.

## 2019-08-07 NOTE — ED PROVIDER NOTES
Patient presents with:  Ear Problem Pain (neurosensory)      HPI:     Kenny Ruth is a 59year old male who presents with a chief complaint of right ear pain for the past couple weeks that started after an upper respiratory virus.   Patient states he on file        Gets together: Not on file        Attends [employer] service: Not on file        Active member of club or organization: Not on file        Attends meetings of clubs or organizations: Not on file        Relationship status: Not on file      In Tab          Sig: Take 1 tablet (875 mg total) by mouth 2 (two) times daily for 10 days. Dispense:  20 tablet          Refill:  0      ofloxacin 0.3 % Otic Solution          Sig: Place 10 drops into the right ear daily for 7 days.           Lavinia Robledo

## 2019-08-08 PROBLEM — I47.10 PSVT (PAROXYSMAL SUPRAVENTRICULAR TACHYCARDIA): Status: ACTIVE | Noted: 2019-08-08

## 2019-08-13 DIAGNOSIS — I47.10 PSVT (PAROXYSMAL SUPRAVENTRICULAR TACHYCARDIA): ICD-10-CM

## 2019-08-13 DIAGNOSIS — I10 ESSENTIAL HYPERTENSION: Primary | ICD-10-CM

## 2019-08-15 DIAGNOSIS — I47.10 PSVT (PAROXYSMAL SUPRAVENTRICULAR TACHYCARDIA): Primary | ICD-10-CM

## 2019-08-17 ENCOUNTER — HOSPITAL ENCOUNTER (OUTPATIENT)
Age: 64
Discharge: HOME | End: 2019-08-17
Payer: COMMERCIAL

## 2019-08-17 VITALS
SYSTOLIC BLOOD PRESSURE: 126 MMHG | BODY MASS INDEX: 39 KG/M2 | OXYGEN SATURATION: 98 % | HEART RATE: 64 BPM | DIASTOLIC BLOOD PRESSURE: 77 MMHG | WEIGHT: 295 LBS | TEMPERATURE: 98 F | RESPIRATION RATE: 18 BRPM

## 2019-08-17 DIAGNOSIS — H10.31 ACUTE BACTERIAL CONJUNCTIVITIS OF RIGHT EYE: Primary | ICD-10-CM

## 2019-08-17 PROCEDURE — 99213 OFFICE O/P EST LOW 20 MIN: CPT

## 2019-08-17 RX ORDER — POLYMYXIN B SULFATE AND TRIMETHOPRIM 1; 10000 MG/ML; [USP'U]/ML
1 SOLUTION OPHTHALMIC
Qty: 10 ML | Refills: 0 | Status: SHIPPED | OUTPATIENT
Start: 2019-08-17 | End: 2019-08-22

## 2019-08-17 NOTE — ED PROVIDER NOTES
Patient presents with:  Conjunctivitis      HPI:     Faith López is a 59year old male with a past history of hyperlipidemia and hypertension presents with a chief complaint of right eye redness, itching and drainage.   Patient reports initially sympt reviewed and discussed with patient. See AVS for detailed discharge instructions for your condition today.     Follow Up with:  James Terrazas DO  [address] [facility]  [phone]

## 2019-08-17 NOTE — ED INITIAL ASSESSMENT (HPI)
PATIENT ARRIVED AMBULATORY TO ROOM C/O RIGHT EYE REDNESS/IRRITATION. PATIENT STATES \"I THOUGHT IT WAS BECAUSE I PET THE DOG AND RUBBED MY EYE BUT NOW THERE IS CRUSTY DRAINAGE\" NO INJURY TO EYE. NO FEVERS. NO CONTACT LENS USE.  NO VISION CHANGES T-piece resuscitator

## 2019-09-12 NOTE — TELEPHONE ENCOUNTER
REFILL ON THE FOLLOWING      Current Outpatient Medications:  Rosuvastatin Calcium 20 MG Oral Tab Take 1 tablet (20 mg total) by mouth nightly.  Disp: 90 tablet Rfl: 0

## 2019-09-13 RX ORDER — ROSUVASTATIN CALCIUM 20 MG/1
20 TABLET, COATED ORAL NIGHTLY
Qty: 90 TABLET | Refills: 1 | Status: SHIPPED | OUTPATIENT
Start: 2019-09-13 | End: 2020-10-15

## 2019-09-13 NOTE — TELEPHONE ENCOUNTER
Refill passed per [facility], [facility] protocol.   Cholesterol Medications  Protocol Criteria:  · Appointment scheduled in the past 12 months or in the next 3 months  · ALT & LDL on file in the past 12 months  · ALT result < 80  · LDL result <130   Recent Outpat

## 2019-09-13 NOTE — TELEPHONE ENCOUNTER
Refill passed per [facility], [facility] protocol.   Passed refill Severe reaction unable to refill

## 2019-10-21 ENCOUNTER — IMMUNIZATION (OUTPATIENT)
Dept: FAMILY MEDICINE CLINIC | Facility: CLINIC | Age: 64
End: 2019-10-21

## 2019-10-21 DIAGNOSIS — Z23 NEED FOR VACCINATION: ICD-10-CM

## 2019-10-21 PROCEDURE — 90471 IMMUNIZATION ADMIN: CPT | Performed by: FAMILY MEDICINE

## 2019-10-21 PROCEDURE — 90686 IIV4 VACC NO PRSV 0.5 ML IM: CPT | Performed by: FAMILY MEDICINE

## 2020-02-10 ENCOUNTER — OFFICE VISIT (OUTPATIENT)
Dept: FAMILY MEDICINE CLINIC | Facility: CLINIC | Age: 65
End: 2020-02-10

## 2020-02-10 VITALS
DIASTOLIC BLOOD PRESSURE: 72 MMHG | HEART RATE: 73 BPM | HEIGHT: 73 IN | RESPIRATION RATE: 20 BRPM | SYSTOLIC BLOOD PRESSURE: 106 MMHG | WEIGHT: 291 LBS | BODY MASS INDEX: 38.57 KG/M2

## 2020-02-10 DIAGNOSIS — E78.00 HIGH CHOLESTEROL: ICD-10-CM

## 2020-02-10 DIAGNOSIS — G47.33 OSA (OBSTRUCTIVE SLEEP APNEA): ICD-10-CM

## 2020-02-10 DIAGNOSIS — Z86.79 HISTORY OF CARDIAC ARRHYTHMIA: ICD-10-CM

## 2020-02-10 DIAGNOSIS — I10 ESSENTIAL HYPERTENSION WITH GOAL BLOOD PRESSURE LESS THAN 130/80: Primary | ICD-10-CM

## 2020-02-10 DIAGNOSIS — E66.01 CLASS 2 SEVERE OBESITY DUE TO EXCESS CALORIES WITH SERIOUS COMORBIDITY AND BODY MASS INDEX (BMI) OF 38.0 TO 38.9 IN ADULT (HCC): ICD-10-CM

## 2020-02-10 PROCEDURE — 99214 OFFICE O/P EST MOD 30 MIN: CPT | Performed by: FAMILY MEDICINE

## 2020-02-10 NOTE — PROGRESS NOTES
Feels well   No palpitations  Reviewed Dr. Conrad Barraza note  nosob       Patient's past medical surgical family social history was reviewed.     Review of Systems  Allergic: no environmental allergies or food allergies  Cardiovascular: no chest pain, irregular

## 2020-03-04 ENCOUNTER — HOSPITAL ENCOUNTER (OUTPATIENT)
Age: 65
Discharge: HOME | End: 2020-03-04
Attending: EMERGENCY MEDICINE
Payer: COMMERCIAL

## 2020-03-04 VITALS
SYSTOLIC BLOOD PRESSURE: 127 MMHG | DIASTOLIC BLOOD PRESSURE: 68 MMHG | TEMPERATURE: 98 F | HEART RATE: 63 BPM | WEIGHT: 288 LBS | HEIGHT: 73 IN | OXYGEN SATURATION: 98 % | RESPIRATION RATE: 17 BRPM | BODY MASS INDEX: 38.17 KG/M2

## 2020-03-04 DIAGNOSIS — M25.562 ACUTE PAIN OF LEFT KNEE: Primary | ICD-10-CM

## 2020-03-04 PROCEDURE — 99214 OFFICE O/P EST MOD 30 MIN: CPT

## 2020-03-04 PROCEDURE — 99213 OFFICE O/P EST LOW 20 MIN: CPT

## 2020-03-04 RX ORDER — INDOMETHACIN 50 MG/1
50 CAPSULE ORAL
Qty: 15 CAPSULE | Refills: 0 | Status: SHIPPED | OUTPATIENT
Start: 2020-03-04 | End: 2020-03-09

## 2020-03-04 NOTE — ED INITIAL ASSESSMENT (HPI)
C/o \"flare up\" of gout to left knee starting yesterday. No fever. No injury. Mild redness to area. + swelling and warm to touch. + distal CMS.

## 2020-03-04 NOTE — ED PROVIDER NOTES
Patient Seen in: [address]      History   Patient presents with:  Gout    Stated Complaint: left knee gout    HPI    60-year-old male who states he has a history of gouty arthritis that recurrence in his left knee.   He t Oropharynx is clear and moist.   Eyes: Conjunctivae and EOM are normal. Pupils are equal, round, and reactive to light. Neck: Neck supple. Cardiovascular: Normal rate, regular rhythm, normal heart sounds and intact distal pulses.     Pulmonary/Chest: Ef

## 2020-07-28 ENCOUNTER — OFFICE VISIT (OUTPATIENT)
Dept: FAMILY MEDICINE CLINIC | Facility: CLINIC | Age: 65
End: 2020-07-28

## 2020-07-28 VITALS
DIASTOLIC BLOOD PRESSURE: 77 MMHG | WEIGHT: 290 LBS | BODY MASS INDEX: 38.43 KG/M2 | HEART RATE: 71 BPM | HEIGHT: 73 IN | SYSTOLIC BLOOD PRESSURE: 133 MMHG

## 2020-07-28 DIAGNOSIS — Z00.00 ROUTINE PHYSICAL EXAMINATION: Primary | ICD-10-CM

## 2020-07-28 DIAGNOSIS — E78.00 HIGH CHOLESTEROL: ICD-10-CM

## 2020-07-28 DIAGNOSIS — I10 ESSENTIAL HYPERTENSION WITH GOAL BLOOD PRESSURE LESS THAN 130/80: ICD-10-CM

## 2020-07-28 PROCEDURE — 3075F SYST BP GE 130 - 139MM HG: CPT | Performed by: FAMILY MEDICINE

## 2020-07-28 PROCEDURE — 3008F BODY MASS INDEX DOCD: CPT | Performed by: FAMILY MEDICINE

## 2020-07-28 PROCEDURE — 99213 OFFICE O/P EST LOW 20 MIN: CPT | Performed by: FAMILY MEDICINE

## 2020-07-28 PROCEDURE — 3078F DIAST BP <80 MM HG: CPT | Performed by: FAMILY MEDICINE

## 2020-07-28 PROCEDURE — 99397 PER PM REEVAL EST PAT 65+ YR: CPT | Performed by: FAMILY MEDICINE

## 2020-07-28 PROCEDURE — 90732 PPSV23 VACC 2 YRS+ SUBQ/IM: CPT | Performed by: FAMILY MEDICINE

## 2020-07-28 PROCEDURE — 90471 IMMUNIZATION ADMIN: CPT | Performed by: FAMILY MEDICINE

## 2020-07-28 NOTE — PROGRESS NOTES
Blood pressure 133/77, pulse 71, height 6' 1\" (1.854 m), weight 290 lb (131.5 kg). REASON FOR VISIT:    Padmini Pfeiffer is a 72year old male who presents for an [address].         Patient Active Problem List:     Arthralgia of left hip Internal Lab or Procedure     Annual Monitoring of Persistent Medications  (ACE/ARB, Digoxin, Diuretics)        Potassium  Annually Potassium (mmol/L)   Date Value   07/12/2019 4.1     POTASSIUM (P) (mmol/L)   Date Value   11/19/2015 3.9       Creatinine GENERAL: feels well otherwise  SKIN: denies any unusual skin lesions  EYES: denies blurred vision or double vision  HEENT: denies nasal congestion, sinus pain or ST  LUNGS: denies shortness of breath with exertion  CARDIOVASCULAR: denies chest pain on ex PNEUMOCOCCAL IMM (PNEUMOVAX)       The patient indicates understanding of these issues and agrees to the plan. No follow-ups on file. Diet counseling perfomed    SUGGESTED VACCINATIONS - Influenza, Pneumococcal, Zoster, Tetanus, HPV   Influenza:  There ar

## 2020-10-12 ENCOUNTER — TELEPHONE (OUTPATIENT)
Dept: INTERNAL MEDICINE CLINIC | Facility: CLINIC | Age: 65
End: 2020-10-12

## 2020-10-12 DIAGNOSIS — Z00.00 ANNUAL PHYSICAL EXAM: ICD-10-CM

## 2020-10-12 RX ORDER — TELMISARTAN 80 MG/1
80 TABLET ORAL DAILY
Qty: 90 TABLET | Refills: 0 | Status: SHIPPED | OUTPATIENT
Start: 2020-10-12 | End: 2020-10-15

## 2020-10-15 ENCOUNTER — OFFICE VISIT (OUTPATIENT)
Dept: FAMILY MEDICINE CLINIC | Facility: CLINIC | Age: 65
End: 2020-10-15

## 2020-10-15 VITALS
HEART RATE: 64 BPM | BODY MASS INDEX: 38.83 KG/M2 | HEIGHT: 73 IN | DIASTOLIC BLOOD PRESSURE: 78 MMHG | SYSTOLIC BLOOD PRESSURE: 123 MMHG | WEIGHT: 293 LBS

## 2020-10-15 DIAGNOSIS — Z00.00 ANNUAL PHYSICAL EXAM: ICD-10-CM

## 2020-10-15 DIAGNOSIS — I10 ESSENTIAL HYPERTENSION WITH GOAL BLOOD PRESSURE LESS THAN 130/80: Primary | ICD-10-CM

## 2020-10-15 PROCEDURE — 90662 IIV NO PRSV INCREASED AG IM: CPT | Performed by: FAMILY MEDICINE

## 2020-10-15 PROCEDURE — 3074F SYST BP LT 130 MM HG: CPT | Performed by: FAMILY MEDICINE

## 2020-10-15 PROCEDURE — 3008F BODY MASS INDEX DOCD: CPT | Performed by: FAMILY MEDICINE

## 2020-10-15 PROCEDURE — 99213 OFFICE O/P EST LOW 20 MIN: CPT | Performed by: FAMILY MEDICINE

## 2020-10-15 PROCEDURE — 3078F DIAST BP <80 MM HG: CPT | Performed by: FAMILY MEDICINE

## 2020-10-15 PROCEDURE — 90471 IMMUNIZATION ADMIN: CPT | Performed by: FAMILY MEDICINE

## 2020-10-15 RX ORDER — TELMISARTAN 80 MG/1
80 TABLET ORAL DAILY
Qty: 90 TABLET | Refills: 0 | Status: SHIPPED | OUTPATIENT
Start: 2020-10-15 | End: 2021-07-01

## 2020-10-15 RX ORDER — ROSUVASTATIN CALCIUM 20 MG/1
20 TABLET, COATED ORAL NIGHTLY
Qty: 90 TABLET | Refills: 1 | Status: SHIPPED | OUTPATIENT
Start: 2020-10-15

## 2020-10-20 NOTE — TELEPHONE ENCOUNTER
The patient was called and informed. He stated she has been trying to do on Mychart and will try again.

## 2020-10-23 ENCOUNTER — EKG ENCOUNTER (OUTPATIENT)
Dept: LAB | Age: 65
End: 2020-10-23
Attending: FAMILY MEDICINE
Payer: COMMERCIAL

## 2020-10-23 ENCOUNTER — LAB ENCOUNTER (OUTPATIENT)
Dept: LAB | Age: 65
End: 2020-10-23
Attending: FAMILY MEDICINE
Payer: COMMERCIAL

## 2020-10-23 DIAGNOSIS — I10 ESSENTIAL HYPERTENSION WITH GOAL BLOOD PRESSURE LESS THAN 130/80: Primary | ICD-10-CM

## 2020-10-23 DIAGNOSIS — I10 ESSENTIAL HYPERTENSION WITH GOAL BLOOD PRESSURE LESS THAN 130/80: ICD-10-CM

## 2020-10-23 PROCEDURE — 36415 COLL VENOUS BLD VENIPUNCTURE: CPT

## 2020-10-23 PROCEDURE — 93005 ELECTROCARDIOGRAM TRACING: CPT

## 2020-10-23 PROCEDURE — 84443 ASSAY THYROID STIM HORMONE: CPT

## 2020-10-23 PROCEDURE — 93010 ELECTROCARDIOGRAM REPORT: CPT | Performed by: FAMILY MEDICINE

## 2020-10-23 PROCEDURE — 80053 COMPREHEN METABOLIC PANEL: CPT

## 2020-10-23 PROCEDURE — 80061 LIPID PANEL: CPT

## 2020-12-16 ENCOUNTER — NURSE TRIAGE (OUTPATIENT)
Dept: FAMILY MEDICINE CLINIC | Facility: CLINIC | Age: 65
End: 2020-12-16

## 2020-12-16 ENCOUNTER — LAB ENCOUNTER (OUTPATIENT)
Dept: LAB | Age: 65
End: 2020-12-16
Attending: PHYSICIAN ASSISTANT
Payer: COMMERCIAL

## 2020-12-16 ENCOUNTER — TELEMEDICINE (OUTPATIENT)
Dept: FAMILY MEDICINE CLINIC | Facility: CLINIC | Age: 65
End: 2020-12-16

## 2020-12-16 ENCOUNTER — TELEPHONE (OUTPATIENT)
Dept: FAMILY MEDICINE CLINIC | Facility: CLINIC | Age: 65
End: 2020-12-16

## 2020-12-16 DIAGNOSIS — J39.9 UPPER RESPIRATORY DISEASE: Primary | ICD-10-CM

## 2020-12-16 DIAGNOSIS — J39.9 UPPER RESPIRATORY DISEASE: ICD-10-CM

## 2020-12-16 PROCEDURE — 99213 OFFICE O/P EST LOW 20 MIN: CPT | Performed by: PHYSICIAN ASSISTANT

## 2020-12-16 NOTE — TELEPHONE ENCOUNTER
----- Message from Fazal Hopper sent at 12/16/2020  7:11 AM CST -----  Regarding: Non-Urgent Medical Question  Contact: [phone]  I woke up with symptoms of runny nose cough Headache. Should I get tested for COVID and were should I go?

## 2020-12-16 NOTE — TELEPHONE ENCOUNTER
Action Requested: Summary for Provider     []  Critical Lab, Recommendations Needed  [] Need Additional Advice  []   FYI    []   Need Orders  [] Need Medications Sent to Pharmacy  []  Other     SUMMARY:       Virtual appointment made for today 12/16/2020 [id number]

## 2020-12-16 NOTE — PROGRESS NOTES
HPI: HPI     I spoke to Carri Alexander via video call, verified date of birth, and discussed current concerns. Onset/duration of current symptoms: 2 days.     Common COVID-19 symptoms per CDC: CDC Clinical Guidance  • Fever:     Yes []     No [x] of children: Not on file      Years of education: Not on file      Highest education level: Not on file    Occupational History      Not on file    Social Needs      Financial resource strain: Not on file      Food insecurity        Worry: Not on file Gastrointestinal: Negative for nausea, vomiting, abdominal pain, diarrhea, constipation, blood in stool and abdominal distention. Skin: Negative for rash. Neurological: Positive for headaches. Negative for dizziness, weakness and numbness.      There

## 2020-12-21 ENCOUNTER — TELEPHONE (OUTPATIENT)
Dept: FAMILY MEDICINE CLINIC | Facility: CLINIC | Age: 65
End: 2020-12-21

## 2020-12-21 NOTE — TELEPHONE ENCOUNTER
Routed to the triage pool for RN follow-up.       ----- Message from Berna Boas sent at 12/21/2020  9:27 AM CST -----  Regarding: Prescription Question  Contact: [phone]  Need cough medicine for Covid-19 cough

## 2020-12-22 ENCOUNTER — TELEMEDICINE (OUTPATIENT)
Dept: FAMILY MEDICINE CLINIC | Facility: CLINIC | Age: 65
End: 2020-12-22

## 2020-12-22 DIAGNOSIS — R05.9 COUGH: Primary | ICD-10-CM

## 2020-12-22 PROCEDURE — 99213 OFFICE O/P EST LOW 20 MIN: CPT | Performed by: PHYSICIAN ASSISTANT

## 2020-12-22 RX ORDER — BENZONATATE 200 MG/1
200 CAPSULE ORAL 3 TIMES DAILY PRN
Qty: 30 CAPSULE | Refills: 0 | Status: SHIPPED | OUTPATIENT
Start: 2020-12-22 | End: 2021-01-11

## 2020-12-22 NOTE — TELEPHONE ENCOUNTER
Patient with cough stating it keeps him up at night, and coughs frequently through the day. Requesting cough medication be sent to [facility] in [place]. Patient denies shortness of breath, chest pain. Please advise.

## 2020-12-22 NOTE — PROGRESS NOTES
HPI: HPI    I spoke to [facility] via video call, verified date of birth, and discussed current concerns. Patient tested positive Covid 19 on 12/16/20. Patient states that his symptoms are getting better except cough.  Patient still has dry coug History      Not on file    Social Needs      Financial resource strain: Not on file      Food insecurity        Worry: Not on file        Inability: Not on file      Transportation needs        Medical: Not on file        Non-medical: Not on file    Tobac Neurological: Negative for dizziness, weakness, numbness and headaches. There were no vitals filed for this visit. There is no height or weight on file to calculate BMI.     Physical Exam:   Physical Exam    Constitutional: He is oriented to person

## 2020-12-23 ENCOUNTER — HOSPITAL ENCOUNTER (EMERGENCY)
Facility: HOSPITAL | Age: 65
Discharge: HOME | End: 2020-12-24
Attending: EMERGENCY MEDICINE
Payer: COMMERCIAL

## 2020-12-23 DIAGNOSIS — U07.1 COVID-19: Primary | ICD-10-CM

## 2020-12-23 PROCEDURE — 99283 EMERGENCY DEPT VISIT LOW MDM: CPT

## 2020-12-24 VITALS
BODY MASS INDEX: 37.11 KG/M2 | TEMPERATURE: 99 F | OXYGEN SATURATION: 94 % | HEART RATE: 96 BPM | DIASTOLIC BLOOD PRESSURE: 68 MMHG | SYSTOLIC BLOOD PRESSURE: 105 MMHG | WEIGHT: 280 LBS | HEIGHT: 73 IN | RESPIRATION RATE: 20 BRPM

## 2020-12-24 RX ORDER — ALBUTEROL SULFATE 90 UG/1
2 AEROSOL, METERED RESPIRATORY (INHALATION) EVERY 4 HOURS PRN
Qty: 1 INHALER | Refills: 0 | Status: SHIPPED | OUTPATIENT
Start: 2020-12-24 | End: 2021-01-08

## 2020-12-24 RX ORDER — CODEINE PHOSPHATE AND GUAIFENESIN 10; 100 MG/5ML; MG/5ML
5 SOLUTION ORAL 3 TIMES DAILY PRN
Qty: 150 ML | Refills: 0 | Status: SHIPPED | OUTPATIENT
Start: 2020-12-24 | End: 2020-12-29

## 2020-12-24 NOTE — ED INITIAL ASSESSMENT (HPI)
PT c/o progressively worsening cough, dx with covid last Wednesday. PT states he is not sob at this time.

## 2020-12-24 NOTE — ED PROVIDER NOTES
Patient Seen in: [facility] Emergency Department      History   Patient presents with:  Covid    Stated Complaint: covid, can't stop coughing    HPI    Patient is a 42-year-old male who presents with persistent cough.   He has been sick with Covid warm, dry, no rashes        ED Course   Labs Reviewed - No data to display      MDM   Pulse ox 96% RA, normal  Patient has a pulse ox at home and states his oxygen saturations have been normal.  Advised to return for pulse ox below 90% or shortness of cristal

## 2020-12-24 NOTE — ED NOTES
Discharge instructions reviewed with pt. Pt denies any further questions at this time. Pt understands to return to ED if symptoms worsen or if his pulse ox is less than 90%. Pt stable and ambulatory at discharge.

## 2020-12-24 NOTE — ED NOTES
Pt presents to ED for a worsening cough after being dx with COVID last week. Pt states he was prescribed cough meds but it has not seem to work for him. Pt denies shortness off breath or chest pain. Pt states he has a pulse ox at home.

## 2021-01-08 ENCOUNTER — VIRTUAL PHONE E/M (OUTPATIENT)
Dept: FAMILY MEDICINE CLINIC | Facility: CLINIC | Age: 66
End: 2021-01-08

## 2021-01-08 DIAGNOSIS — R05.9 COUGH: Primary | ICD-10-CM

## 2021-01-08 PROCEDURE — 99213 OFFICE O/P EST LOW 20 MIN: CPT | Performed by: FAMILY MEDICINE

## 2021-01-08 RX ORDER — ALBUTEROL SULFATE 90 UG/1
2 AEROSOL, METERED RESPIRATORY (INHALATION) EVERY 4 HOURS PRN
Qty: 1 INHALER | Refills: 1 | Status: SHIPPED | OUTPATIENT
Start: 2021-01-08 | End: 2021-02-07

## 2021-01-08 NOTE — PROGRESS NOTES
Virtual Telephone Check-In    Sangeeta Shirley verbally consents to a Virtual/Telephone Check-In visit on 01/08/21. Patient has been referred to the [facility] website at [email or web address] to review the yearly Consent to Treat document.     Patient und

## 2021-01-11 DIAGNOSIS — R05.9 COUGH: ICD-10-CM

## 2021-01-12 DIAGNOSIS — R05.9 COUGH: ICD-10-CM

## 2021-01-12 RX ORDER — BENZONATATE 200 MG/1
200 CAPSULE ORAL 3 TIMES DAILY PRN
Qty: 30 CAPSULE | Refills: 0 | Status: SHIPPED | OUTPATIENT
Start: 2021-01-12 | End: 2021-01-12

## 2021-01-12 RX ORDER — BENZONATATE 200 MG/1
200 CAPSULE ORAL 3 TIMES DAILY PRN
Qty: 30 CAPSULE | Refills: 0 | Status: SHIPPED | OUTPATIENT
Start: 2021-01-12 | End: 2021-07-08 | Stop reason: ALTCHOICE

## 2021-01-14 ENCOUNTER — HOSPITAL ENCOUNTER (OUTPATIENT)
Dept: GENERAL RADIOLOGY | Age: 66
Discharge: HOME | End: 2021-01-14
Attending: FAMILY MEDICINE
Payer: COMMERCIAL

## 2021-01-14 ENCOUNTER — OFFICE VISIT (OUTPATIENT)
Dept: FAMILY MEDICINE CLINIC | Facility: CLINIC | Age: 66
End: 2021-01-14

## 2021-01-14 VITALS
HEART RATE: 78 BPM | HEIGHT: 73 IN | WEIGHT: 288.56 LBS | DIASTOLIC BLOOD PRESSURE: 78 MMHG | BODY MASS INDEX: 38.24 KG/M2 | SYSTOLIC BLOOD PRESSURE: 131 MMHG

## 2021-01-14 DIAGNOSIS — M25.562 LEFT KNEE PAIN, UNSPECIFIED CHRONICITY: ICD-10-CM

## 2021-01-14 DIAGNOSIS — M25.562 LEFT KNEE PAIN, UNSPECIFIED CHRONICITY: Primary | ICD-10-CM

## 2021-01-14 PROCEDURE — 3008F BODY MASS INDEX DOCD: CPT | Performed by: FAMILY MEDICINE

## 2021-01-14 PROCEDURE — 3078F DIAST BP <80 MM HG: CPT | Performed by: FAMILY MEDICINE

## 2021-01-14 PROCEDURE — 99213 OFFICE O/P EST LOW 20 MIN: CPT | Performed by: FAMILY MEDICINE

## 2021-01-14 PROCEDURE — 3075F SYST BP GE 130 - 139MM HG: CPT | Performed by: FAMILY MEDICINE

## 2021-01-14 PROCEDURE — 73562 X-RAY EXAM OF KNEE 3: CPT | Performed by: FAMILY MEDICINE

## 2021-01-14 RX ORDER — TRIAMCINOLONE ACETONIDE 40 MG/ML
40 INJECTION, SUSPENSION INTRA-ARTICULAR; INTRAMUSCULAR ONCE
Status: DISCONTINUED | OUTPATIENT
Start: 2021-01-14 | End: 2021-10-04

## 2021-01-14 RX ORDER — HYDROCODONE BITARTRATE AND ACETAMINOPHEN 5; 325 MG/1; MG/1
TABLET ORAL
Qty: 40 TABLET | Refills: 0 | Status: SHIPPED | OUTPATIENT
Start: 2021-01-14 | End: 2021-10-04

## 2021-01-14 NOTE — PROCEDURES
Informed consent obtained all questions answered    Aseptic technique employed for knee aspiration and injection    Ethyl chloride for topical anesthesia    Kenalog 40 mg/ML x1 mL and lidocaine 1% x 5 mL    Patient tolerated well    No fluid aspirated

## 2021-01-14 NOTE — PROGRESS NOTES
Blood pressure 131/78, pulse 78, height 6' 1\" (1.854 m), weight 288 lb 9 oz (130.9 kg). Patient presents today complaining of left knee pain for 1 week. History of gout.     Denies injuries    He does feel pain going up and down stairs pain keeps him a

## 2021-02-22 DIAGNOSIS — E78.00 HIGH CHOLESTEROL: Primary | ICD-10-CM

## 2021-03-12 DIAGNOSIS — Z23 NEED FOR VACCINATION: ICD-10-CM

## 2021-05-03 ENCOUNTER — NURSE TRIAGE (OUTPATIENT)
Dept: FAMILY MEDICINE CLINIC | Facility: CLINIC | Age: 66
End: 2021-05-03

## 2021-05-03 NOTE — TELEPHONE ENCOUNTER
Action Requested: Summary for Provider     []  Critical Lab, Recommendations Needed  [] Need Additional Advice  [x]   FYI    []   Need Orders  [] Need Medications Sent to Pharmacy  []  Other     SUMMARY:  For the past 3 weeks patient has been having left

## 2021-05-03 NOTE — TELEPHONE ENCOUNTER
----- Message from Letty Giron sent at 5/3/2021 12:38 PM CDT -----  Regarding: Referral Request  Contact: [phone]  I'm having a problem with old neck issue.  About 8 years ago I had bulging disk problems c3 c4 and they gave me 2 steroids shots

## 2021-05-04 ENCOUNTER — OFFICE VISIT (OUTPATIENT)
Dept: FAMILY MEDICINE CLINIC | Facility: CLINIC | Age: 66
End: 2021-05-04

## 2021-05-04 VITALS
SYSTOLIC BLOOD PRESSURE: 110 MMHG | WEIGHT: 292 LBS | OXYGEN SATURATION: 96 % | BODY MASS INDEX: 38.7 KG/M2 | HEART RATE: 79 BPM | HEIGHT: 73 IN | DIASTOLIC BLOOD PRESSURE: 70 MMHG

## 2021-05-04 DIAGNOSIS — M54.2 NECK PAIN: Primary | ICD-10-CM

## 2021-05-04 PROCEDURE — 3074F SYST BP LT 130 MM HG: CPT | Performed by: FAMILY MEDICINE

## 2021-05-04 PROCEDURE — 99213 OFFICE O/P EST LOW 20 MIN: CPT | Performed by: FAMILY MEDICINE

## 2021-05-04 PROCEDURE — 3078F DIAST BP <80 MM HG: CPT | Performed by: FAMILY MEDICINE

## 2021-05-04 PROCEDURE — 3008F BODY MASS INDEX DOCD: CPT | Performed by: FAMILY MEDICINE

## 2021-05-04 RX ORDER — METHYLPREDNISOLONE 4 MG/1
TABLET ORAL
Qty: 1 KIT | Refills: 1 | Status: SHIPPED | OUTPATIENT
Start: 2021-05-04 | End: 2021-07-08 | Stop reason: ALTCHOICE

## 2021-05-04 NOTE — PROGRESS NOTES
Blood pressure 110/70, pulse 79, height 6' 1\" (1.854 m), weight 292 lb (132.5 kg), SpO2 96 %. Complaining of neck pain for the past 3 to 4 weeks no injury. Did receive steroid injections previously for discogenic neck pain.   Denies pain down his a

## 2021-05-25 ENCOUNTER — OFFICE VISIT (OUTPATIENT)
Dept: PHYSICAL THERAPY | Age: 66
End: 2021-05-25
Attending: FAMILY MEDICINE
Payer: COMMERCIAL

## 2021-05-25 DIAGNOSIS — M54.2 NECK PAIN: ICD-10-CM

## 2021-05-25 PROCEDURE — 97162 PT EVAL MOD COMPLEX 30 MIN: CPT

## 2021-05-25 PROCEDURE — 97140 MANUAL THERAPY 1/> REGIONS: CPT

## 2021-05-25 NOTE — PROGRESS NOTES
CERVICAL SPINE EVALUATION:   Referring Physician: Lemuel Reid DO    Date of Onset: March  Date of Service: 5/25/21   Neck pain (M54.2)   SUBJECTIVE:   PATIENT SUMMARY:  Carri Alexander is a 72year old y/o male who presents to therapy today wi personal factors/comorbidities, 3 body structures involved/activity limitations, and evolving symptoms including changing pain levels.       Precautions: None       OBJECTIVE:   Observation/Posture: fwd head, rounded shoulders    Cervical AROM:  Pain (+/-) tolerance for turning head to check blind spot while driving  4. Pt will be able to lift and carry >30# without any neck pain to be able to perform work duties.      Frequency/Duration: Patient will be seen for 1-2x/week or a total of 8 visits over a 90 da

## 2021-06-01 ENCOUNTER — OFFICE VISIT (OUTPATIENT)
Dept: PHYSICAL THERAPY | Age: 66
End: 2021-06-01
Attending: FAMILY MEDICINE
Payer: COMMERCIAL

## 2021-06-01 DIAGNOSIS — M54.2 NECK PAIN: ICD-10-CM

## 2021-06-01 PROCEDURE — 97110 THERAPEUTIC EXERCISES: CPT

## 2021-06-01 PROCEDURE — 97140 MANUAL THERAPY 1/> REGIONS: CPT

## 2021-06-01 NOTE — PROGRESS NOTES
Dx:  Neck pain (M54.2)        Insurance (Authorized # of Visits):  [address] exp 8/7/21   POC visits: 6  Authorizing Physician: Dr. Sue Chan  Next MD visit:   Fall Risk: standard         Precautions:    FOTO initial: status- 54/100          D/C FOTO: stat

## 2021-06-08 ENCOUNTER — OFFICE VISIT (OUTPATIENT)
Dept: PHYSICAL THERAPY | Age: 66
End: 2021-06-08
Attending: FAMILY MEDICINE
Payer: COMMERCIAL

## 2021-06-08 PROCEDURE — 97110 THERAPEUTIC EXERCISES: CPT

## 2021-06-08 PROCEDURE — 97140 MANUAL THERAPY 1/> REGIONS: CPT

## 2021-06-08 NOTE — PROGRESS NOTES
Dx:  Neck pain (M54.2)        Insurance (Authorized # of Visits):  [address] exp 8/7/21   POC visits: 6  Authorizing Physician: Dr. Janie Garcia MD visit:   Fall Risk: standard         Precautions:    FOTO initial: status- 54/100          D/C FOTO: stat

## 2021-06-10 ENCOUNTER — OFFICE VISIT (OUTPATIENT)
Dept: PHYSICAL THERAPY | Age: 66
End: 2021-06-10
Attending: FAMILY MEDICINE
Payer: COMMERCIAL

## 2021-06-10 PROCEDURE — 97140 MANUAL THERAPY 1/> REGIONS: CPT

## 2021-06-10 PROCEDURE — 97110 THERAPEUTIC EXERCISES: CPT

## 2021-06-17 ENCOUNTER — OFFICE VISIT (OUTPATIENT)
Dept: PHYSICAL THERAPY | Age: 66
End: 2021-06-17
Attending: FAMILY MEDICINE
Payer: COMMERCIAL

## 2021-06-17 PROCEDURE — 97110 THERAPEUTIC EXERCISES: CPT

## 2021-06-17 PROCEDURE — 97140 MANUAL THERAPY 1/> REGIONS: CPT

## 2021-06-17 NOTE — PROGRESS NOTES
Dx:  Neck pain (M54.2)        Insurance (Authorized # of Visits):  [address] exp 8/7/21   POC visits: 6  Authorizing Physician: Dr. Gilson Guan  Next MD visit:   Fall Risk: standard         Precautions:    FOTO initial: status- 54/100          D/C FOTO: stat 15x2  Rows blue TB 15x2 There ex:  Supine horiz abd blue TB 15x2  Supine D2 flex blue TB 15x  Supine chin tuck w/towel roll 15x  Prone Ws 15x2  Prone Is 15x2  Pivot prone 10x2  Wall ball roll up stretch 10x2          Manual therapy:  Supine left cervical m

## 2021-06-22 ENCOUNTER — APPOINTMENT (OUTPATIENT)
Dept: PHYSICAL THERAPY | Age: 66
End: 2021-06-22
Attending: FAMILY MEDICINE
Payer: COMMERCIAL

## 2021-06-24 ENCOUNTER — OFFICE VISIT (OUTPATIENT)
Dept: PHYSICAL THERAPY | Age: 66
End: 2021-06-24
Attending: FAMILY MEDICINE
Payer: COMMERCIAL

## 2021-06-24 PROCEDURE — 97140 MANUAL THERAPY 1/> REGIONS: CPT

## 2021-06-24 PROCEDURE — 97110 THERAPEUTIC EXERCISES: CPT

## 2021-06-24 NOTE — PROGRESS NOTES
Dx:  Neck pain (M54.2)        Insurance (Authorized # of Visits):  [address] exp 8/7/21   POC visits: 6  Authorizing Physician: Dr. Natalie Watts  Next MD visit:   Fall Risk: standard         Precautions:    FOTO initial: status- 54/100          D/C FOTO: stat blue TB 15x  Supine chin tuck w/towel roll 15x  Prone Ws 15x2  Prone Is 15x2  Pivot prone 10x2  Wall ball roll up stretch 10x2       There ex:  Prone Is 2# 15x  Prone Ws 2# 15x  Supine horiz abd blue TB 15x  Supine D2 blue TB 15x R/L  Pivot prone 15x  Wall

## 2021-06-29 ENCOUNTER — OFFICE VISIT (OUTPATIENT)
Dept: PHYSICAL THERAPY | Age: 66
End: 2021-06-29
Attending: FAMILY MEDICINE
Payer: COMMERCIAL

## 2021-06-29 PROCEDURE — 97140 MANUAL THERAPY 1/> REGIONS: CPT

## 2021-06-29 PROCEDURE — 97110 THERAPEUTIC EXERCISES: CPT

## 2021-06-29 NOTE — PROGRESS NOTES
Dx:  Neck pain (M54.2)        Insurance (Authorized # of Visits):  [address] exp 8/7/21   POC visits: 6  Authorizing Physician: Dr. Chapincito Boyle  Next MD visit:   Fall Risk: standard         Precautions:    FOTO initial: status- 54/100          D/C FOTO: stat stretch 10x2       There ex:  Prone Is 2# 15x  Prone Ws 2# 15x  Supine horiz abd blue TB 15x  Supine D2 blue TB 15x R/L  Pivot prone 15x  Wall pushups 15x  Lower trap pulls blue TB 15x2  Sh ext blue TB 15x2  Rows blue TB 15x2 There ex:  Prone Is 2# 15x2

## 2021-07-01 ENCOUNTER — OFFICE VISIT (OUTPATIENT)
Dept: PHYSICAL THERAPY | Age: 66
End: 2021-07-01
Attending: FAMILY MEDICINE
Payer: COMMERCIAL

## 2021-07-01 DIAGNOSIS — Z00.00 ANNUAL PHYSICAL EXAM: ICD-10-CM

## 2021-07-01 PROCEDURE — 97140 MANUAL THERAPY 1/> REGIONS: CPT

## 2021-07-01 PROCEDURE — 97110 THERAPEUTIC EXERCISES: CPT

## 2021-07-01 RX ORDER — TELMISARTAN 80 MG/1
80 TABLET ORAL DAILY
Qty: 90 TABLET | Refills: 3 | Status: SHIPPED | OUTPATIENT
Start: 2021-07-01

## 2021-07-01 RX ORDER — TELMISARTAN 80 MG/1
80 TABLET ORAL DAILY
Qty: 90 TABLET | Refills: 0 | OUTPATIENT
Start: 2021-07-01

## 2021-07-01 NOTE — PROGRESS NOTES
Gael  Pt has attended 8 visits in Physical Therapy.    Dx:  Neck pain (M54.2)        Insurance (Authorized # of Visits):  [address] exp 8/7/21   POC visits: 6  Authorizing Physician: Dr. Ranelle Lanes  Next MD visit:   Fall Risk: standard c-spine  Seated left rot w/neutral spine 10x  Supine left rot w/neutral spine  There ex:  Prone Ws 15x  Prone Is 15x  Lower trap pulls green TB 15x2  Rows green TB 15x2  Doorway pec stretch [id number] There ex:  Prone Ws 15x  Prone Is 15x  Pivot prone 10x2  W Total Timed Treatment:  40 min  Total Treatment Time: 40 min

## 2021-07-08 ENCOUNTER — HOSPITAL ENCOUNTER (OUTPATIENT)
Dept: GENERAL RADIOLOGY | Age: 66
Discharge: HOME | End: 2021-07-08
Attending: FAMILY MEDICINE
Payer: COMMERCIAL

## 2021-07-08 ENCOUNTER — OFFICE VISIT (OUTPATIENT)
Dept: FAMILY MEDICINE CLINIC | Facility: CLINIC | Age: 66
End: 2021-07-08

## 2021-07-08 VITALS
BODY MASS INDEX: 39.36 KG/M2 | SYSTOLIC BLOOD PRESSURE: 132 MMHG | WEIGHT: 297 LBS | HEIGHT: 73 IN | HEART RATE: 75 BPM | DIASTOLIC BLOOD PRESSURE: 77 MMHG

## 2021-07-08 DIAGNOSIS — M50.20 HERNIATED CERVICAL INTERVERTEBRAL DISC: ICD-10-CM

## 2021-07-08 DIAGNOSIS — M54.2 NECK PAIN: Primary | ICD-10-CM

## 2021-07-08 DIAGNOSIS — M54.2 NECK PAIN: ICD-10-CM

## 2021-07-08 DIAGNOSIS — H61.21 IMPACTED CERUMEN OF RIGHT EAR: ICD-10-CM

## 2021-07-08 PROCEDURE — 3078F DIAST BP <80 MM HG: CPT | Performed by: FAMILY MEDICINE

## 2021-07-08 PROCEDURE — 3075F SYST BP GE 130 - 139MM HG: CPT | Performed by: FAMILY MEDICINE

## 2021-07-08 PROCEDURE — 72040 X-RAY EXAM NECK SPINE 2-3 VW: CPT | Performed by: FAMILY MEDICINE

## 2021-07-08 PROCEDURE — 99214 OFFICE O/P EST MOD 30 MIN: CPT | Performed by: FAMILY MEDICINE

## 2021-07-08 PROCEDURE — 3008F BODY MASS INDEX DOCD: CPT | Performed by: FAMILY MEDICINE

## 2021-07-08 RX ORDER — FLUTICASONE PROPIONATE 50 MCG
2 SPRAY, SUSPENSION (ML) NASAL DAILY
Qty: 1 EACH | Refills: 11 | Status: SHIPPED | OUTPATIENT
Start: 2021-07-08 | End: 2021-11-05

## 2021-07-08 NOTE — PROGRESS NOTES
Blood pressure 132/77, pulse 75, height 6' 1\" (1.854 m), weight 297 lb (134.7 kg). Patient presents today reporting that he continues to have neck pain after 8 physical therapy sessions. Has had the neck pain for 3 months.   Previously was diagnose

## 2021-07-09 ENCOUNTER — OFFICE VISIT (OUTPATIENT)
Dept: OTOLARYNGOLOGY | Facility: CLINIC | Age: 66
End: 2021-07-09

## 2021-07-09 VITALS
SYSTOLIC BLOOD PRESSURE: 128 MMHG | WEIGHT: 297 LBS | TEMPERATURE: 98 F | DIASTOLIC BLOOD PRESSURE: 82 MMHG | BODY MASS INDEX: 39.36 KG/M2 | HEIGHT: 73 IN

## 2021-07-09 DIAGNOSIS — H61.20 WAX IN EAR: Primary | ICD-10-CM

## 2021-07-09 PROCEDURE — 3008F BODY MASS INDEX DOCD: CPT | Performed by: OTOLARYNGOLOGY

## 2021-07-09 PROCEDURE — 3079F DIAST BP 80-89 MM HG: CPT | Performed by: OTOLARYNGOLOGY

## 2021-07-09 PROCEDURE — 3074F SYST BP LT 130 MM HG: CPT | Performed by: OTOLARYNGOLOGY

## 2021-07-09 PROCEDURE — 99203 OFFICE O/P NEW LOW 30 MIN: CPT | Performed by: OTOLARYNGOLOGY

## 2021-07-09 NOTE — PROGRESS NOTES
Marcela Duarte is a 77year old male.  Patient presents with:  Ear Problem: Patient here for Bilateral Ear wax Impaction, per pt right ear fieels clogged at night time        HISTORY OF PRESENT ILLNESS  7/9/2021   Here for evaluation of right sided  he Active Member of Clubs or Organizations:       Attends Club or Organization Meetings:       Marital Status:   Intimate Partner Violence:       Fear of Current or Ex-Partner:       Emotionally Abused:       Physically Abused:       Sexually Abused:     Reagan Lawrence Normal, Left: Normal. Pupil - Right: Normal, Left: Normal. EOMI. ANTONINA   Ears Normal  normal to inspection ear canals are nl after wax was removed.   tympanic membranes are nl     Audiogram was not done today       PROCEDURE: After informed consent was Reece Cai

## 2021-07-26 ENCOUNTER — HOSPITAL ENCOUNTER (OUTPATIENT)
Dept: MRI IMAGING | Facility: HOSPITAL | Age: 66
Discharge: HOME | End: 2021-07-26
Attending: FAMILY MEDICINE
Payer: COMMERCIAL

## 2021-07-26 DIAGNOSIS — M54.2 NECK PAIN: ICD-10-CM

## 2021-07-26 DIAGNOSIS — M50.20 HERNIATED CERVICAL INTERVERTEBRAL DISC: ICD-10-CM

## 2021-07-26 PROCEDURE — 72141 MRI NECK SPINE W/O DYE: CPT | Performed by: FAMILY MEDICINE

## 2021-08-12 ENCOUNTER — HOSPITAL ENCOUNTER (OUTPATIENT)
Dept: GENERAL RADIOLOGY | Facility: HOSPITAL | Age: 66
Discharge: HOME | End: 2021-08-12
Attending: NEUROLOGICAL SURGERY
Payer: COMMERCIAL

## 2021-08-12 DIAGNOSIS — M54.2 CERVICALGIA: ICD-10-CM

## 2021-08-12 PROCEDURE — 72050 X-RAY EXAM NECK SPINE 4/5VWS: CPT | Performed by: NEUROLOGICAL SURGERY

## 2021-10-04 ENCOUNTER — OFFICE VISIT (OUTPATIENT)
Dept: PAIN CLINIC | Facility: HOSPITAL | Age: 66
End: 2021-10-04
Attending: ANESTHESIOLOGY
Payer: COMMERCIAL

## 2021-10-04 VITALS
RESPIRATION RATE: 18 BRPM | WEIGHT: 297 LBS | BODY MASS INDEX: 39.36 KG/M2 | HEIGHT: 73 IN | HEART RATE: 66 BPM | DIASTOLIC BLOOD PRESSURE: 76 MMHG | SYSTOLIC BLOOD PRESSURE: 125 MMHG

## 2021-10-04 DIAGNOSIS — M48.02 NEUROFORAMINAL STENOSIS OF CERVICAL SPINE: ICD-10-CM

## 2021-10-04 DIAGNOSIS — M54.12 LEFT CERVICAL RADICULOPATHY: ICD-10-CM

## 2021-10-04 DIAGNOSIS — M48.02 SPINAL STENOSIS, CERVICAL REGION: ICD-10-CM

## 2021-10-04 DIAGNOSIS — M50.30 DEGENERATIVE DISC DISEASE, CERVICAL: Primary | ICD-10-CM

## 2021-10-04 PROCEDURE — 99202 OFFICE O/P NEW SF 15 MIN: CPT

## 2021-10-04 NOTE — CHRONIC PAIN
[facility]  Pain Consultation     HISTORY OF PRESENT ILLNESS:  Tomasa Pettit is a 77year old old male referred to the pain clinic by [facility] for Degenerative disc disease, cervical  (primary encounter diagnosis)  Left cervical radicu REVIEW OF SYSTEMS:   Bowel/Bladder Incontinence: as above  Coughing/sneezing/straining does not exacerbate the pain.   Numbness/tingling: as above  Weakness: as above  Weight Loss: Negative   Fever: Negative   Cardiovascular:  No current chest pain or History of tanning: Not Asked        Outdoor occupation: Not Asked        Reaction to local anesthetic: No    Social History Narrative      Not on file    Social Determinants of Health  Financial Resource Strain:       Difficulty of Paying Living Expenses: normal to touch bilateral upper and lower extremities  Edema - Absent  Sensation (light touch/pinprick/temperature):     Right Upper Extremity:  Normal  Left Upper Extremity: Normal    ROM:   CERVICAL SPINE    Degree Pain   Flexion 45 No   Extension 10 yes C5-C6 and C6-C7. PREVERTEBRAL SOFT TISSUES: Negative.     OBLIQUE VIEWS: Moderate narrowing of the right C4-C5 and C5-C6 neural foramen.  Mild narrowing of the left C3-C4 and C4-C5 neural foramen.  Moderate narrowing of the left C6-C7 neural foramen.    O cord without cord signal change.  There is moderate central canal narrowing.  There is also uncovertebral joint hypertrophy and facet arthrosis resulting in mild right greater than left   neural foraminal narrowing.    C5-C6: Broad-based disc bulge/osteophy resulting in central canal stenosis and neuroforaminal narrowing patient has failed conservative therapy as well as physical therapy     PLAN:  RECOMMENDATIONS:  1) cervical epidural steroid injection with fluoroscopic guidance  I have informed Lanre MADISON

## 2021-10-04 NOTE — PROGRESS NOTES
10/4/2021-Presents ambulatory to [facility] to establish care;  NEW CONSULT C/O LT SIDED CERVICAL NECK PAIN;  States 4mo hx denies injury/trauma;   also completed 8 session PT without relief; injection therapy in the past with good results;   referred by Dr. Rhea Will

## 2021-10-05 ENCOUNTER — DOCUMENTATION ONLY (OUTPATIENT)
Dept: PAIN CLINIC | Facility: HOSPITAL | Age: 66
End: 2021-10-05

## 2021-10-05 NOTE — PROGRESS NOTES
PROCEDURE CODE  00049-IXME C6/7    [employer]/IHP  AUTH NEEDED  VIA P-WEB PORTAL  ALL CLINICALS ENTERED IN THE PORTAL  PENDING Oliverio Mendoza #  [id number]

## 2021-10-12 ENCOUNTER — TELEPHONE (OUTPATIENT)
Dept: PAIN CLINIC | Facility: HOSPITAL | Age: 66
End: 2021-10-12

## 2021-11-15 ENCOUNTER — PATIENT MESSAGE (OUTPATIENT)
Dept: FAMILY MEDICINE CLINIC | Facility: CLINIC | Age: 66
End: 2021-11-15

## 2021-11-15 NOTE — TELEPHONE ENCOUNTER
From: Ryan De Leon  To: Yessenia Hernandez DO  Sent: 11/15/2021 7:17 AM CST  Subject: Referral - Cervialepidural steroid injections    The insurance is looking for clinical info for approval process which needs to be done asap as referral is do to [facility]

## 2021-12-02 ENCOUNTER — OFFICE VISIT (OUTPATIENT)
Dept: PAIN CLINIC | Facility: HOSPITAL | Age: 66
End: 2021-12-02
Attending: NURSE PRACTITIONER
Payer: COMMERCIAL

## 2021-12-02 ENCOUNTER — LAB ENCOUNTER (OUTPATIENT)
Dept: LAB | Facility: HOSPITAL | Age: 66
End: 2021-12-02
Attending: NURSE PRACTITIONER
Payer: COMMERCIAL

## 2021-12-02 VITALS — SYSTOLIC BLOOD PRESSURE: 126 MMHG | OXYGEN SATURATION: 97 % | HEART RATE: 65 BPM | DIASTOLIC BLOOD PRESSURE: 83 MMHG

## 2021-12-02 DIAGNOSIS — M54.12 LEFT CERVICAL RADICULOPATHY: ICD-10-CM

## 2021-12-02 DIAGNOSIS — E78.00 HIGH CHOLESTEROL: ICD-10-CM

## 2021-12-02 DIAGNOSIS — M50.30 DEGENERATIVE DISC DISEASE, CERVICAL: Primary | ICD-10-CM

## 2021-12-02 DIAGNOSIS — M48.02 NEUROFORAMINAL STENOSIS OF CERVICAL SPINE: ICD-10-CM

## 2021-12-02 DIAGNOSIS — M48.02 SPINAL STENOSIS, CERVICAL REGION: ICD-10-CM

## 2021-12-02 PROCEDURE — 36415 COLL VENOUS BLD VENIPUNCTURE: CPT

## 2021-12-02 PROCEDURE — 99211 OFF/OP EST MAY X REQ PHY/QHP: CPT

## 2021-12-02 PROCEDURE — 84460 ALANINE AMINO (ALT) (SGPT): CPT

## 2021-12-02 PROCEDURE — 84450 TRANSFERASE (AST) (SGOT): CPT

## 2021-12-02 PROCEDURE — 80061 LIPID PANEL: CPT

## 2021-12-02 NOTE — CHRONIC PAIN
Follow-up Note  CC: neck pain   HISTORY OF PRESENT ILLNESS:  Sergio Rosa is a 77year old right handed male, originally referred to the pain clinic by [email or web address]. [email or web address], with history of Degenerative disc disease, cervical  (primary encounter diagnosis) as above  Weight Loss: Negative   Fever: Negative   Cardiovascular:  No current chest pain or palpitations   Respiratory:  No current shortness of breath   Gastrointestinal:  No active ulcer  Genitourinary:  Negative  Integumentary :  Negative  Psychiatric Outdoor occupation: Not Asked        Reaction to local anesthetic: No    Social History Narrative      Not on file    Social Determinants of Health  Financial Resource Strain: Not on file  Food Insecurity: Not on file  Transportation Needs: Not on file  P cervical intervertebral disc M54.2 Neck pain       TECHNIQUE: A variety of imaging planes and parameters were utilized for visualization of suspected pathology.         FINDINGS:   CRANIOCERVICAL AREA: Normal foramen magnum with no Chiari malformation.   narrowing.  Central canal narrowing is most advanced at C4-5 and C5-6.  Neural foraminal narrowing is most advanced on the right at C4-5   and C6-7 and on the left at C3-4.       Dictated by (CST): Daniele Simmons MD on 7/26/2021 at 7:54 AM       Finalized by

## 2021-12-02 NOTE — PROGRESS NOTES
PT presents ambulatory to the [facility].  Pt reports 5-6/10  left sided cervical neck pain. JUDAH Marshall saw PT for med eval.  See notes for POC.

## 2021-12-06 ENCOUNTER — DOCUMENTATION ONLY (OUTPATIENT)
Dept: PAIN CLINIC | Facility: HOSPITAL | Age: 66
End: 2021-12-06

## 2021-12-20 ENCOUNTER — TELEPHONE (OUTPATIENT)
Dept: PAIN CLINIC | Facility: HOSPITAL | Age: 66
End: 2021-12-20

## 2021-12-20 NOTE — TELEPHONE ENCOUNTER
Spoke with pt advised injection is still under review with insurance plan. Once a decision has been made writer will reach out to pt and schedule procedure.

## 2022-03-11 ENCOUNTER — OFFICE VISIT (OUTPATIENT)
Dept: CARDIOLOGY | Age: 67
End: 2022-03-11

## 2022-03-11 VITALS
SYSTOLIC BLOOD PRESSURE: 136 MMHG | HEIGHT: 73 IN | BODY MASS INDEX: 38.97 KG/M2 | HEART RATE: 64 BPM | DIASTOLIC BLOOD PRESSURE: 82 MMHG | WEIGHT: 294 LBS

## 2022-03-11 DIAGNOSIS — R93.1 AGATSTON CORONARY ARTERY CALCIUM SCORE LESS THAN 100: ICD-10-CM

## 2022-03-11 DIAGNOSIS — I47.10 PSVT (PAROXYSMAL SUPRAVENTRICULAR TACHYCARDIA): ICD-10-CM

## 2022-03-11 DIAGNOSIS — I10 ESSENTIAL HYPERTENSION: Primary | ICD-10-CM

## 2022-03-11 DIAGNOSIS — G47.33 OBSTRUCTIVE SLEEP APNEA SYNDROME: ICD-10-CM

## 2022-03-11 DIAGNOSIS — E78.5 DYSLIPIDEMIA: ICD-10-CM

## 2022-03-11 PROBLEM — G47.30 SLEEP APNEA: Status: ACTIVE | Noted: 2022-03-11

## 2022-03-11 PROCEDURE — 3079F DIAST BP 80-89 MM HG: CPT | Performed by: INTERNAL MEDICINE

## 2022-03-11 PROCEDURE — 99204 OFFICE O/P NEW MOD 45 MIN: CPT | Performed by: INTERNAL MEDICINE

## 2022-03-11 PROCEDURE — 3075F SYST BP GE 130 - 139MM HG: CPT | Performed by: INTERNAL MEDICINE

## 2022-03-11 SDOH — HEALTH STABILITY: PHYSICAL HEALTH: ON AVERAGE, HOW MANY MINUTES DO YOU ENGAGE IN EXERCISE AT THIS LEVEL?: 50 MIN

## 2022-03-11 SDOH — HEALTH STABILITY: PHYSICAL HEALTH: ON AVERAGE, HOW MANY DAYS PER WEEK DO YOU ENGAGE IN MODERATE TO STRENUOUS EXERCISE (LIKE A BRISK WALK)?: 5 DAYS

## 2022-03-11 ASSESSMENT — PATIENT HEALTH QUESTIONNAIRE - PHQ9
SUM OF ALL RESPONSES TO PHQ9 QUESTIONS 1 AND 2: 0
CLINICAL INTERPRETATION OF PHQ2 SCORE: NO FURTHER SCREENING NEEDED
SUM OF ALL RESPONSES TO PHQ9 QUESTIONS 1 AND 2: 0
2. FEELING DOWN, DEPRESSED OR HOPELESS: NOT AT ALL
1. LITTLE INTEREST OR PLEASURE IN DOING THINGS: NOT AT ALL

## 2022-03-11 ASSESSMENT — ENCOUNTER SYMPTOMS
HEMATOCHEZIA: 0
WEIGHT LOSS: 0
BRUISES/BLEEDS EASILY: 0
FEVER: 0
HEMOPTYSIS: 0
SUSPICIOUS LESIONS: 0
CHILLS: 0
COUGH: 0
ALLERGIC/IMMUNOLOGIC COMMENTS: NO NEW FOOD ALLERGIES
WEIGHT GAIN: 0

## 2022-03-14 ENCOUNTER — TELEPHONE (OUTPATIENT)
Dept: CARDIOLOGY | Age: 67
End: 2022-03-14

## 2022-03-22 ENCOUNTER — TELEPHONE (OUTPATIENT)
Dept: CARDIOLOGY | Age: 67
End: 2022-03-22

## 2022-03-22 ENCOUNTER — APPOINTMENT (OUTPATIENT)
Dept: CARDIOLOGY | Age: 67
End: 2022-03-22
Attending: INTERNAL MEDICINE

## 2022-04-20 ENCOUNTER — OFFICE VISIT (OUTPATIENT)
Dept: PAIN CLINIC | Facility: HOSPITAL | Age: 67
End: 2022-04-20
Attending: ANESTHESIOLOGY
Payer: COMMERCIAL

## 2022-04-20 VITALS
DIASTOLIC BLOOD PRESSURE: 90 MMHG | WEIGHT: 297 LBS | RESPIRATION RATE: 18 BRPM | SYSTOLIC BLOOD PRESSURE: 141 MMHG | HEART RATE: 59 BPM | BODY MASS INDEX: 39.36 KG/M2 | HEIGHT: 73 IN

## 2022-04-20 DIAGNOSIS — M48.02 NEUROFORAMINAL STENOSIS OF CERVICAL SPINE: ICD-10-CM

## 2022-04-20 DIAGNOSIS — M54.12 LEFT CERVICAL RADICULOPATHY: ICD-10-CM

## 2022-04-20 DIAGNOSIS — M50.30 DEGENERATIVE DISC DISEASE, CERVICAL: Primary | ICD-10-CM

## 2022-04-20 DIAGNOSIS — M48.02 SPINAL STENOSIS, CERVICAL REGION: ICD-10-CM

## 2022-04-20 PROCEDURE — 99211 OFF/OP EST MAY X REQ PHY/QHP: CPT

## 2022-04-20 NOTE — PROGRESS NOTES
4/20/2022-presents ambulatory to [facility]; f/u LT CERVICAL NECK PAIN RADICULOPATHY LT SHOULDER & AT TIMES DOWN LUE; Hx DDD cervical spine, has failed conservative treatments using NSAIDS and P.T. Continues  HEP but little improvement; is interested in injection therapy; has had excellent results in the past; seen by Dr. Chapo Montoya; refer to dictation for the plan of are.

## 2022-04-21 ENCOUNTER — DOCUMENTATION ONLY (OUTPATIENT)
Dept: PAIN CLINIC | Facility: HOSPITAL | Age: 67
End: 2022-04-21

## 2022-04-21 NOTE — PROGRESS NOTES
Procedure code [facility]--- Saturnino Lawrence needed via [place] web portal   All clinicals submitted, case went into medical review   Once approval has been rcv'd writer will reach out to pt and schedule procedure.        Pending auth # [id number]

## 2022-05-20 ENCOUNTER — DOCUMENTATION ONLY (OUTPATIENT)
Dept: PAIN CLINIC | Facility: HOSPITAL | Age: 67
End: 2022-05-20

## 2022-05-20 NOTE — PROGRESS NOTES
Procedure code [address]--- Called at 9:53 am at # [id number] with Aaliyah Pal, case is still pending review  Per Aaliyah Pal possibility of approval being given by this afternoon.    Writer will check at later time the status

## 2022-06-10 ENCOUNTER — TELEPHONE (OUTPATIENT)
Dept: INTERNAL MEDICINE CLINIC | Facility: CLINIC | Age: 67
End: 2022-06-10

## 2022-06-10 NOTE — TELEPHONE ENCOUNTER
Patient is due for an annual [address].  Left msg for patient to return phone call, please schedule appropriately  [address] [place] [address] Connector list.

## 2022-06-16 ENCOUNTER — OFFICE VISIT (OUTPATIENT)
Dept: PAIN CLINIC | Facility: HOSPITAL | Age: 67
End: 2022-06-16
Attending: ANESTHESIOLOGY
Payer: COMMERCIAL

## 2022-06-16 ENCOUNTER — DOCUMENTATION ONLY (OUTPATIENT)
Dept: PAIN CLINIC | Facility: HOSPITAL | Age: 67
End: 2022-06-16

## 2022-06-16 VITALS — OXYGEN SATURATION: 96 % | HEART RATE: 66 BPM | SYSTOLIC BLOOD PRESSURE: 127 MMHG | DIASTOLIC BLOOD PRESSURE: 83 MMHG

## 2022-06-16 DIAGNOSIS — M50.30 DEGENERATIVE DISC DISEASE, CERVICAL: Primary | Chronic | ICD-10-CM

## 2022-06-16 PROCEDURE — 99211 OFF/OP EST MAY X REQ PHY/QHP: CPT

## 2022-06-16 NOTE — PROGRESS NOTES
PROCEDURE CODE 02473-LOQTPDX APPROVAL     TACO VELÁZQUEZ NEEDED VIA IHP  All clinical submitted web portal  Case went to clinical review    PENDING CASE # [id number]

## 2022-06-16 NOTE — PROGRESS NOTES
PT presents ambulatory to the [facility].  Pt reports no improvement. Still has 4/10 cervical, LT shoulder and LUE pain. Pain increases while laying down and with certain movements. Dr. Karen Lyle saw PT for CHENCHO F/U. Refer to dictation for POC.

## 2022-08-04 ENCOUNTER — HOSPITAL ENCOUNTER (OUTPATIENT)
Age: 67
Discharge: HOME | End: 2022-08-04
Attending: EMERGENCY MEDICINE
Payer: COMMERCIAL

## 2022-08-04 VITALS
DIASTOLIC BLOOD PRESSURE: 75 MMHG | HEART RATE: 68 BPM | OXYGEN SATURATION: 97 % | RESPIRATION RATE: 20 BRPM | SYSTOLIC BLOOD PRESSURE: 123 MMHG | TEMPERATURE: 98 F

## 2022-08-04 DIAGNOSIS — J02.9 VIRAL PHARYNGITIS: Primary | ICD-10-CM

## 2022-08-04 LAB — S PYO AG THROAT QL: NEGATIVE

## 2022-08-04 PROCEDURE — 99213 OFFICE O/P EST LOW 20 MIN: CPT

## 2022-08-04 PROCEDURE — 87880 STREP A ASSAY W/OPTIC: CPT

## 2022-08-04 PROCEDURE — 99212 OFFICE O/P EST SF 10 MIN: CPT

## 2022-08-10 ENCOUNTER — OFFICE VISIT (OUTPATIENT)
Dept: PAIN CLINIC | Facility: HOSPITAL | Age: 67
End: 2022-08-10
Attending: STUDENT IN AN ORGANIZED HEALTH CARE EDUCATION/TRAINING PROGRAM
Payer: COMMERCIAL

## 2022-08-10 VITALS — OXYGEN SATURATION: 96 % | HEART RATE: 66 BPM | SYSTOLIC BLOOD PRESSURE: 117 MMHG | DIASTOLIC BLOOD PRESSURE: 78 MMHG

## 2022-08-10 DIAGNOSIS — M50.30 DDD (DEGENERATIVE DISC DISEASE), CERVICAL: Primary | ICD-10-CM

## 2022-08-10 PROCEDURE — 99211 OFF/OP EST MAY X REQ PHY/QHP: CPT

## 2022-08-10 NOTE — PROGRESS NOTES
PT presents ambulatory to the [facility]. PT reports about 70% improvement. PT still has some stabbing pain on the left side of his neck when he lays down. Currently 3/10 pain that can get to a 'short lived\" 8/10 when laying down. Dr. Audi Acosta saw PT for CHENCHO F/U. Gabo Donaldson   See notes for POC

## 2022-08-23 ENCOUNTER — OFFICE VISIT (OUTPATIENT)
Dept: FAMILY MEDICINE CLINIC | Facility: CLINIC | Age: 67
End: 2022-08-23
Payer: COMMERCIAL

## 2022-08-23 VITALS
WEIGHT: 297 LBS | DIASTOLIC BLOOD PRESSURE: 78 MMHG | BODY MASS INDEX: 39.36 KG/M2 | HEART RATE: 68 BPM | HEIGHT: 73 IN | SYSTOLIC BLOOD PRESSURE: 124 MMHG

## 2022-08-23 DIAGNOSIS — E78.00 HIGH CHOLESTEROL: Primary | ICD-10-CM

## 2022-08-23 PROCEDURE — 3008F BODY MASS INDEX DOCD: CPT | Performed by: FAMILY MEDICINE

## 2022-08-23 PROCEDURE — 99213 OFFICE O/P EST LOW 20 MIN: CPT | Performed by: FAMILY MEDICINE

## 2022-08-23 PROCEDURE — 3078F DIAST BP <80 MM HG: CPT | Performed by: FAMILY MEDICINE

## 2022-08-23 PROCEDURE — 3074F SYST BP LT 130 MM HG: CPT | Performed by: FAMILY MEDICINE

## 2022-08-23 RX ORDER — PITAVASTATIN CALCIUM 2.09 MG/1
1 TABLET, FILM COATED ORAL DAILY
Qty: 30 TABLET | Refills: 2 | Status: SHIPPED | OUTPATIENT
Start: 2022-08-23 | End: 2022-09-22

## 2022-08-23 NOTE — PROGRESS NOTES
Blood pressure 124/78, pulse 68, height 6' 1\" (1.854 m), weight 297 lb (134.7 kg). FOLLOWING UP  interested in trying therapy to lower cholesterol. Statin medications tried to DATE  caused severe muscle aches and joint pain.     Objective patient comfortable no apparent distress    Assessment dyslipidemia    Plan LOVALO prescription as recommended by cardiology will provide prior authorization if needed

## 2022-08-25 ENCOUNTER — TELEPHONE (OUTPATIENT)
Dept: FAMILY MEDICINE CLINIC | Facility: CLINIC | Age: 67
End: 2022-08-25

## 2022-08-29 ENCOUNTER — TELEPHONE (OUTPATIENT)
Dept: FAMILY MEDICINE CLINIC | Facility: CLINIC | Age: 67
End: 2022-08-29

## 2022-09-16 ENCOUNTER — APPOINTMENT (OUTPATIENT)
Dept: CARDIOLOGY | Age: 67
End: 2022-09-16

## 2022-10-11 ENCOUNTER — PATIENT MESSAGE (OUTPATIENT)
Dept: FAMILY MEDICINE CLINIC | Facility: CLINIC | Age: 67
End: 2022-10-11

## 2022-10-11 DIAGNOSIS — Z00.00 ANNUAL PHYSICAL EXAM: ICD-10-CM

## 2022-10-11 NOTE — TELEPHONE ENCOUNTER
Called [facility] to follow up on PA form that was faxed over on 08/30/2022. [employer] states they never received it. Will send new form. Completed form and faxed back to [phone], along with office notes. Await outcome which may take 24-72 business hours.

## 2022-10-11 NOTE — TELEPHONE ENCOUNTER
From: Stacey Avalos  To: Sarbjit Hernandez DO  Sent: 10/11/2022 8:29 AM CDT  Subject: Livalo    I never heard back from insurance on approval

## 2022-10-12 DIAGNOSIS — Z00.00 ANNUAL PHYSICAL EXAM: ICD-10-CM

## 2022-10-12 NOTE — TELEPHONE ENCOUNTER
Please review; protocol failed/no protocol. Requested Prescriptions   Pending Prescriptions Disp Refills    telmisartan 80 MG Oral Tab 90 tablet 3     Sig: Take 1 tablet (80 mg total) by mouth daily. Hypertensive Medications Protocol Failed - 10/12/2022 10:35 AM        Failed - CMP or BMP in past 6 months     No results found for this or any previous visit (from the past 4392 hour(s)).               Failed - EGFRCR or GFRNAA > 50     GFR Evaluation              Passed - In person appointment in the past 12 or next 3 months       Recent Outpatient Visits              1 month ago High cholesterol    [address] [facility], DO    Office Visit    2 months ago DDD (degenerative disc disease), cervical    [facility] Tiffany Arnett MD    Office Visit    3 months ago Degenerative disc disease, cervical    [facility] Saima Francis MD    Office Visit    5 months ago Degenerative disc disease, cervical    [facility] Lluvia Hall MD    Office Visit    10 months ago Degenerative disc disease, cervical    [facility] JACOB Tomlinson    Office Visit                 Passed - Last BP reading less than 140/90     BP Readings from Last 1 Encounters:  08/23/22 : 124/78                Passed - In person appointment or virtual visit in the past 6 months       Recent Outpatient Visits              1 month ago High cholesterol    [facility], [address], [place]    Office Visit    2 months ago DDD (degenerative disc disease), cervical    [facility] Tiffany Arnett MD    Office Visit    3 months ago Degenerative disc disease, cervical    [facility] Saima Francis MD    Office Visit    5 months ago Degenerative disc disease, cervical    [facility] Cristina Smith MD    Office Visit    10 months ago Degenerative disc disease, cervical    [facility] JACOB Lion    Office Visit                        Recent Outpatient Visits              1 month ago High cholesterol    [facility], [address], DO    Office Visit    2 months ago DDD (degenerative disc disease), cervical    [facility] Adan Cazares MD    Office Visit    3 months ago Degenerative disc disease, cervical    [facility] Kirti Dooley MD    Office Visit    5 months ago Degenerative disc disease, cervical    [facility] Cristina Smith MD    Office Visit    10 months ago Degenerative disc disease, cervical    [facility] JACOB Lion    Office Visit

## 2022-10-13 RX ORDER — TELMISARTAN 80 MG/1
80 TABLET ORAL DAILY
Qty: 90 TABLET | Refills: 3 | OUTPATIENT
Start: 2022-10-13

## 2022-10-13 RX ORDER — TELMISARTAN 80 MG/1
80 TABLET ORAL DAILY
Qty: 90 TABLET | Refills: 1 | Status: SHIPPED | OUTPATIENT
Start: 2022-10-13

## 2023-01-05 RX ORDER — PITAVASTATIN CALCIUM 2.09 MG/1
1 TABLET, FILM COATED ORAL DAILY
Qty: 30 TABLET | Refills: 2 | Status: SHIPPED | OUTPATIENT
Start: 2023-01-05 | End: 2023-02-04

## 2023-04-13 ENCOUNTER — TELEPHONE (OUTPATIENT)
Dept: FAMILY MEDICINE CLINIC | Facility: CLINIC | Age: 68
End: 2023-04-13

## 2023-04-14 ENCOUNTER — PATIENT MESSAGE (OUTPATIENT)
Dept: FAMILY MEDICINE CLINIC | Facility: CLINIC | Age: 68
End: 2023-04-14

## 2023-04-14 ENCOUNTER — OFFICE VISIT (OUTPATIENT)
Dept: FAMILY MEDICINE CLINIC | Facility: CLINIC | Age: 68
End: 2023-04-14

## 2023-04-14 VITALS
DIASTOLIC BLOOD PRESSURE: 78 MMHG | SYSTOLIC BLOOD PRESSURE: 130 MMHG | BODY MASS INDEX: 38.83 KG/M2 | HEIGHT: 73 IN | WEIGHT: 293 LBS | HEART RATE: 67 BPM

## 2023-04-14 DIAGNOSIS — H91.93 BILATERAL HEARING LOSS, UNSPECIFIED HEARING LOSS TYPE: ICD-10-CM

## 2023-04-14 DIAGNOSIS — J43.8 OTHER EMPHYSEMA (HCC): ICD-10-CM

## 2023-04-14 DIAGNOSIS — H67.2 OTITIS MEDIA OF LEFT EAR IN DISEASE CLASSIFIED ELSEWHERE: Primary | ICD-10-CM

## 2023-04-14 DIAGNOSIS — I10 ESSENTIAL HYPERTENSION: ICD-10-CM

## 2023-04-14 DIAGNOSIS — E78.5 HYPERLIPIDEMIA, UNSPECIFIED HYPERLIPIDEMIA TYPE: ICD-10-CM

## 2023-04-14 PROBLEM — J44.9 CHRONIC OBSTRUCTIVE PULMONARY DISEASE, UNSPECIFIED (HCC): Status: ACTIVE | Noted: 2023-04-14

## 2023-04-14 PROCEDURE — 3075F SYST BP GE 130 - 139MM HG: CPT | Performed by: FAMILY MEDICINE

## 2023-04-14 PROCEDURE — 3078F DIAST BP <80 MM HG: CPT | Performed by: FAMILY MEDICINE

## 2023-04-14 PROCEDURE — 99214 OFFICE O/P EST MOD 30 MIN: CPT | Performed by: FAMILY MEDICINE

## 2023-04-14 PROCEDURE — 3008F BODY MASS INDEX DOCD: CPT | Performed by: FAMILY MEDICINE

## 2023-04-14 RX ORDER — METHYLPREDNISOLONE 4 MG/1
TABLET ORAL
Qty: 1 EACH | Refills: 1 | Status: SHIPPED | OUTPATIENT
Start: 2023-04-14

## 2023-04-14 RX ORDER — BUDESONIDE AND FORMOTEROL FUMARATE DIHYDRATE 160; 4.5 UG/1; UG/1
2 AEROSOL RESPIRATORY (INHALATION) 2 TIMES DAILY
Qty: 1 EACH | Refills: 1 | Status: SHIPPED | OUTPATIENT
Start: 2023-04-14

## 2023-04-14 RX ORDER — FLUTICASONE PROPIONATE 50 MCG
2 SPRAY, SUSPENSION (ML) NASAL DAILY
Qty: 1 EACH | Refills: 5 | Status: SHIPPED | OUTPATIENT
Start: 2023-04-14 | End: 2023-08-12

## 2023-04-14 RX ORDER — AMOXICILLIN 500 MG/1
500 CAPSULE ORAL 3 TIMES DAILY
Qty: 30 CAPSULE | Refills: 0 | Status: SHIPPED | OUTPATIENT
Start: 2023-04-14 | End: 2023-04-24

## 2023-04-14 RX ORDER — FLUTICASONE PROPIONATE AND SALMETEROL XINAFOATE 230; 21 UG/1; UG/1
2 AEROSOL, METERED RESPIRATORY (INHALATION) 2 TIMES DAILY
Qty: 1 EACH | Refills: 1 | Status: SHIPPED | OUTPATIENT
Start: 2023-04-14 | End: 2023-04-14

## 2023-04-14 RX ORDER — CODEINE PHOSPHATE AND GUAIFENESIN 10; 100 MG/5ML; MG/5ML
10 SOLUTION ORAL EVERY 6 HOURS PRN
Qty: 236 ML | Refills: 0 | Status: SHIPPED | OUTPATIENT
Start: 2023-04-14

## 2023-04-14 NOTE — TELEPHONE ENCOUNTER
From: Adwoa Weller  To: Khalida Hernandez DO  Sent: 4/14/2023 10:09 AM CDT  Subject: Air infection    Do I need a medication to cover ear infection? Albert contacted me and said Advair is not covered under insurance.

## 2023-04-14 NOTE — TELEPHONE ENCOUNTER
Dr. Crews, I don't have access to change fees for service.  Kiley, can you help Dr. Crews with this?  Thank you.   Plan #1 amoxicillin prescription #2 Advair inhaler Cheratussin AC      Please send amoxicillin script

## 2023-04-14 NOTE — PROGRESS NOTES
Blood pressure 130/78, pulse 67, height 6' 1\" (1.854 m), weight 293 lb (132.9 kg). 10-day history of cough keeping him awake at night gray to green phlegm. No nasal congestion no fever no chills. No dyspnea no sore throat left ear fullness no pain. Used TheraFlu without relief. Some headache no facial pressure no bad breath or tooth pain. No smoking for 30 years no asthma. Occasional sneezing no watery or itchy eyes. Also reporting that his left buttock area is painful. Requesting refill on his Flonase. Losing hearing in his left ear. Objective patient comfortable no apparent distress left ear with distorted TM erythematous    Lungs clear to auscultation no rales rhonchi wheezes    Throat clear nonerythematous    Negative logrolling left hip    Assessment #1 otitis media #2 inflammatory cough #3 left buttock pain    Plan #1 amoxicillin prescription #2 Advair inhaler Cheratussin AC    RISKS AND BENEFITS EXPLAINED. MEDICATION MAY CAUSE DROWSINESS. DO NOT DRIVE OR OPERATE HEAVY MACHINERY.     #3 Medrol Dosepak    Fasting blood test ordered and Flonase refill    Follow-up for complete physical

## 2023-04-25 ENCOUNTER — OFFICE VISIT (OUTPATIENT)
Dept: OTOLARYNGOLOGY | Facility: CLINIC | Age: 68
End: 2023-04-25

## 2023-04-25 ENCOUNTER — OFFICE VISIT (OUTPATIENT)
Dept: AUDIOLOGY | Facility: CLINIC | Age: 68
End: 2023-04-25

## 2023-04-25 VITALS — WEIGHT: 293 LBS | BODY MASS INDEX: 38.83 KG/M2 | HEIGHT: 73 IN

## 2023-04-25 DIAGNOSIS — H91.93 BILATERAL HEARING LOSS, UNSPECIFIED HEARING LOSS TYPE: Primary | ICD-10-CM

## 2023-04-25 DIAGNOSIS — H69.90 EUSTACHIAN TUBE DISORDER, UNSPECIFIED LATERALITY: ICD-10-CM

## 2023-04-25 DIAGNOSIS — H90.A32 MIXED CONDUCTIVE AND SENSORINEURAL HEARING LOSS OF LEFT EAR WITH RESTRICTED HEARING OF RIGHT EAR: Primary | ICD-10-CM

## 2023-04-25 PROCEDURE — 69433 CREATE EARDRUM OPENING: CPT | Performed by: STUDENT IN AN ORGANIZED HEALTH CARE EDUCATION/TRAINING PROGRAM

## 2023-04-25 PROCEDURE — 92557 COMPREHENSIVE HEARING TEST: CPT | Performed by: AUDIOLOGIST

## 2023-04-25 PROCEDURE — 99214 OFFICE O/P EST MOD 30 MIN: CPT | Performed by: STUDENT IN AN ORGANIZED HEALTH CARE EDUCATION/TRAINING PROGRAM

## 2023-04-25 PROCEDURE — 3008F BODY MASS INDEX DOCD: CPT | Performed by: STUDENT IN AN ORGANIZED HEALTH CARE EDUCATION/TRAINING PROGRAM

## 2023-04-25 PROCEDURE — 92567 TYMPANOMETRY: CPT | Performed by: AUDIOLOGIST

## 2023-04-25 RX ORDER — METHYLPREDNISOLONE 4 MG/1
TABLET ORAL
Qty: 21 TABLET | Refills: 0 | Status: SHIPPED | OUTPATIENT
Start: 2023-04-25

## 2023-04-25 RX ORDER — PSEUDOEPHEDRINE HCL 120 MG/1
120 TABLET, FILM COATED, EXTENDED RELEASE ORAL EVERY 12 HOURS
Qty: 42 TABLET | Refills: 0 | Status: SHIPPED | OUTPATIENT
Start: 2023-04-25 | End: 2023-05-16

## 2023-05-01 RX ORDER — BUDESONIDE AND FORMOTEROL FUMARATE DIHYDRATE 160; 4.5 UG/1; UG/1
2 AEROSOL RESPIRATORY (INHALATION) 2 TIMES DAILY
Qty: 3 EACH | Refills: 3 | Status: SHIPPED | OUTPATIENT
Start: 2023-05-01

## 2023-05-01 NOTE — TELEPHONE ENCOUNTER
Refill passed per [place] [employer], [facility] protocol    Requested Prescriptions   Pending Prescriptions Disp Refills    Budesonide-Formoterol Fumarate (SYMBICORT) 160-4.5 MCG/ACT Inhalation Aerosol 3 each 3     Sig: Inhale 2 puffs into the lungs 2 (two) times daily.        Asthma & COPD Medication Protocol Passed - 5/1/2023  9:41 AM        Passed - In person appointment or virtual visit in the past 6 mos or appointment in next 3 mos     Recent Outpatient Visits              6 days ago Mixed conductive and sensorineural hearing loss of left ear with restricted hearing of right ear    [address] [facility], [place] Magdalena De La Torre    Office Visit    6 days ago Bilateral hearing loss, unspecified hearing loss type    [facility], [address] [facility], [place], Maryana Gu MD    Office Visit    2 weeks ago Otitis media of left ear in disease classified elsewhere    [address] [facility], [facility], [place]    Office Visit    8 months ago High cholesterol    [facility], [place]    Office Visit    8 months ago DDD (degenerative disc disease), cervical    [facility] Radha Reese MD    Office Visit

## 2023-05-12 ENCOUNTER — LAB ENCOUNTER (OUTPATIENT)
Dept: LAB | Facility: HOSPITAL | Age: 68
End: 2023-05-12
Attending: FAMILY MEDICINE
Payer: COMMERCIAL

## 2023-05-12 ENCOUNTER — OFFICE VISIT (OUTPATIENT)
Dept: OTOLARYNGOLOGY | Facility: CLINIC | Age: 68
End: 2023-05-12

## 2023-05-12 ENCOUNTER — OFFICE VISIT (OUTPATIENT)
Dept: AUDIOLOGY | Facility: CLINIC | Age: 68
End: 2023-05-12

## 2023-05-12 VITALS — WEIGHT: 293 LBS | BODY MASS INDEX: 39 KG/M2

## 2023-05-12 DIAGNOSIS — H90.3 SENSORINEURAL HEARING LOSS (SNHL) OF BOTH EARS: ICD-10-CM

## 2023-05-12 DIAGNOSIS — I10 ESSENTIAL HYPERTENSION: ICD-10-CM

## 2023-05-12 DIAGNOSIS — H91.90 HEARING LOSS, UNSPECIFIED HEARING LOSS TYPE, UNSPECIFIED LATERALITY: Primary | ICD-10-CM

## 2023-05-12 DIAGNOSIS — H69.90 EUSTACHIAN TUBE DISORDER, UNSPECIFIED LATERALITY: ICD-10-CM

## 2023-05-12 DIAGNOSIS — H67.2 OTITIS MEDIA OF LEFT EAR IN DISEASE CLASSIFIED ELSEWHERE: ICD-10-CM

## 2023-05-12 DIAGNOSIS — H90.8 MIXED CONDUCTIVE AND SENSORINEURAL HEARING LOSS, UNSPECIFIED LATERALITY: Primary | ICD-10-CM

## 2023-05-12 DIAGNOSIS — H90.6 MIXED HEARING LOSS, BILATERAL: ICD-10-CM

## 2023-05-12 DIAGNOSIS — E78.5 HYPERLIPIDEMIA, UNSPECIFIED HYPERLIPIDEMIA TYPE: ICD-10-CM

## 2023-05-12 LAB
ALBUMIN SERPL-MCNC: 3.6 G/DL (ref 3.4–5)
ALBUMIN/GLOB SERPL: 1.1 {RATIO} (ref 1–2)
ALP LIVER SERPL-CCNC: 62 U/L
ALT SERPL-CCNC: 34 U/L
ANION GAP SERPL CALC-SCNC: 6 MMOL/L (ref 0–18)
AST SERPL-CCNC: 16 U/L (ref 15–37)
BASOPHILS # BLD AUTO: 0.05 X10(3) UL (ref 0–0.2)
BASOPHILS NFR BLD AUTO: 1 %
BILIRUB SERPL-MCNC: 0.7 MG/DL (ref 0.1–2)
BUN BLD-MCNC: 27 MG/DL (ref 7–18)
BUN/CREAT SERPL: 31.4 (ref 10–20)
CALCIUM BLD-MCNC: 8.8 MG/DL (ref 8.5–10.1)
CHLORIDE SERPL-SCNC: 111 MMOL/L (ref 98–112)
CHOLEST SERPL-MCNC: 204 MG/DL (ref ?–200)
CO2 SERPL-SCNC: 24 MMOL/L (ref 21–32)
COMPLEXED PSA SERPL-MCNC: 3.08 NG/ML (ref ?–4)
CREAT BLD-MCNC: 0.86 MG/DL
DEPRECATED RDW RBC AUTO: 45.1 FL (ref 35.1–46.3)
EOSINOPHIL # BLD AUTO: 0.18 X10(3) UL (ref 0–0.7)
EOSINOPHIL NFR BLD AUTO: 3.6 %
ERYTHROCYTE [DISTWIDTH] IN BLOOD BY AUTOMATED COUNT: 13.7 % (ref 11–15)
FASTING PATIENT LIPID ANSWER: YES
FASTING STATUS PATIENT QL REPORTED: YES
GFR SERPLBLD BASED ON 1.73 SQ M-ARVRAT: 95 ML/MIN/1.73M2 (ref 60–?)
GLOBULIN PLAS-MCNC: 3.4 G/DL (ref 2.8–4.4)
GLUCOSE BLD-MCNC: 97 MG/DL (ref 70–99)
HCT VFR BLD AUTO: 42.7 %
HDLC SERPL-MCNC: 33 MG/DL (ref 40–59)
HGB BLD-MCNC: 14.3 G/DL
IMM GRANULOCYTES # BLD AUTO: 0.02 X10(3) UL (ref 0–1)
IMM GRANULOCYTES NFR BLD: 0.4 %
LDLC SERPL CALC-MCNC: 139 MG/DL (ref ?–100)
LYMPHOCYTES # BLD AUTO: 1.44 X10(3) UL (ref 1–4)
LYMPHOCYTES NFR BLD AUTO: 28.6 %
MCH RBC QN AUTO: 30.1 PG (ref 26–34)
MCHC RBC AUTO-ENTMCNC: 33.5 G/DL (ref 31–37)
MCV RBC AUTO: 89.9 FL
MONOCYTES # BLD AUTO: 0.47 X10(3) UL (ref 0.1–1)
MONOCYTES NFR BLD AUTO: 9.3 %
NEUTROPHILS # BLD AUTO: 2.88 X10 (3) UL (ref 1.5–7.7)
NEUTROPHILS # BLD AUTO: 2.88 X10(3) UL (ref 1.5–7.7)
NEUTROPHILS NFR BLD AUTO: 57.1 %
NONHDLC SERPL-MCNC: 171 MG/DL (ref ?–130)
OSMOLALITY SERPL CALC.SUM OF ELEC: 297 MOSM/KG (ref 275–295)
PLATELET # BLD AUTO: 213 10(3)UL (ref 150–450)
POTASSIUM SERPL-SCNC: 4 MMOL/L (ref 3.5–5.1)
PROT SERPL-MCNC: 7 G/DL (ref 6.4–8.2)
RBC # BLD AUTO: 4.75 X10(6)UL
SODIUM SERPL-SCNC: 141 MMOL/L (ref 136–145)
TRIGL SERPL-MCNC: 175 MG/DL (ref 30–149)
TSI SER-ACNC: 1.59 MIU/ML (ref 0.36–3.74)
VLDLC SERPL CALC-MCNC: 33 MG/DL (ref 0–30)
WBC # BLD AUTO: 5 X10(3) UL (ref 4–11)

## 2023-05-12 PROCEDURE — 92557 COMPREHENSIVE HEARING TEST: CPT | Performed by: AUDIOLOGIST

## 2023-05-12 PROCEDURE — 80053 COMPREHEN METABOLIC PANEL: CPT

## 2023-05-12 PROCEDURE — 36415 COLL VENOUS BLD VENIPUNCTURE: CPT

## 2023-05-12 PROCEDURE — 80061 LIPID PANEL: CPT

## 2023-05-12 PROCEDURE — 92567 TYMPANOMETRY: CPT | Performed by: AUDIOLOGIST

## 2023-05-12 PROCEDURE — 84443 ASSAY THYROID STIM HORMONE: CPT

## 2023-05-12 PROCEDURE — 85025 COMPLETE CBC W/AUTO DIFF WBC: CPT

## 2023-05-15 DIAGNOSIS — Z00.00 ANNUAL PHYSICAL EXAM: ICD-10-CM

## 2023-05-15 RX ORDER — TELMISARTAN 80 MG/1
80 TABLET ORAL DAILY
Qty: 90 TABLET | Refills: 3 | Status: SHIPPED | OUTPATIENT
Start: 2023-05-15

## 2023-05-16 NOTE — TELEPHONE ENCOUNTER
Refill passed per [place] [employer], [facility] protocol. Requested Prescriptions   Pending Prescriptions Disp Refills    telmisartan 80 MG Oral Tab 90 tablet 1     Sig: Take 1 tablet (80 mg total) by mouth daily.        Hypertensive Medications Protocol Passed - 5/15/2023 12:53 PM        Passed - In person appointment in the past 12 or next 3 months     Recent Outpatient Visits              3 days ago Mixed conductive and sensorineural hearing loss, unspecified laterality    [facility], [address] [facility], [place], [place], [address], [employer] Visit    3 days ago Hearing loss, unspecified hearing loss type, unspecified laterality    [facility], [address] [facility] [address],[address], Edvin Chowdary MD    Office Visit    2 weeks ago Mixed conductive and sensorineural hearing loss of left ear with restricted hearing of right ear    [facility] [address] [facility], [place]    Office Visit    2 weeks ago Bilateral hearing loss, unspecified hearing loss type    [facility], [address] [facility], [place] Maria Barclay MD    Office Visit    1 month ago Otitis media of left ear in disease classified elsewhere    [address], [place] [facility],     Office Visit                      Passed - Last BP reading less than 140/90     BP Readings from Last 1 Encounters:  04/14/23 : 130/78                Passed - CMP or BMP in past 6 months     Recent Results (from the past 4392 hour(s))   COMP METABOLIC PANEL (14)    Collection Time: 05/12/23  8:57 AM   Result Value Ref Range    Glucose 97 70 - 99 mg/dL    Sodium 141 136 - 145 mmol/L    Potassium 4.0 3.5 - 5.1 mmol/L    Chloride 111 98 - 112 mmol/L    CO2 24.0 21.0 - 32.0 mmol/L    Anion Gap 6 0 - 18 mmol/L    BUN 27 (H) 7 - 18 mg/dL    Creatinine 0.86 0.70 - 1.30 mg/dL    BUN/CREA Ratio 31.4 (H) 10.0 - 20.0    Calcium, Total 8.8 8.5 - 10.1 mg/dL    Calculated Osmolality 297 (H) 275 - 295 mOsm/kg    eGFR-Cr 95 >=60 mL/min/1.73m2    ALT 34 16 - 61 U/L    AST 16 15 - 37 U/L    Alkaline Phosphatase 62 45 - 117 U/L    Bilirubin, Total 0.7 0.1 - 2.0 mg/dL    Total Protein 7.0 6.4 - 8.2 g/dL    Albumin 3.6 3.4 - 5.0 g/dL    Globulin  3.4 2.8 - 4.4 g/dL    A/G Ratio 1.1 1.0 - 2.0    Patient Fasting for CMP? Yes      *Note: Due to a large number of results and/or encounters for the requested time period, some results have not been displayed. A complete set of results can be found in Results Review.                  Passed - In person appointment or virtual visit in the past 6 months     Recent Outpatient Visits              3 days ago Mixed conductive and sensorineural hearing loss, unspecified laterality    [facility], [address] [facility] [place], [place], [employer] Visit    3 days ago Hearing loss, unspecified hearing loss type, unspecified laterality    [facility], [address] [facility], Manish Carcamo MD    Office Visit    2 weeks ago Mixed conductive and sensorineural hearing loss of left ear with restricted hearing of right ear    [address], [address] [facility] [address], [place] Colletta Peper, Au.D    Office Visit    2 weeks ago Bilateral hearing loss, unspecified hearing loss type    [address], [address] [facility] [address] [facility], Manish Carcamo MD    Office Visit    1 month ago Otitis media of left ear in disease classified elsewhere    [address], [address], [place], [place], DO    Office Visit                      Passed - EGFRCR or GFRNAA > 50     GFR Evaluation  EGFRCR: 95 , resulted on 5/12/2023                       Recent Outpatient Visits              3 days ago Mixed conductive and sensorineural hearing loss, unspecified laterality    [address] [facility] [place] [employer] Visit    3 days ago Hearing loss, unspecified hearing loss type, unspecified laterality [address] [facility] Yannick Hurd MD    Office Visit    2 weeks ago Mixed conductive and sensorineural hearing loss of left ear with restricted hearing of right ear    [address], [address], Magdalena Méndez    Office Visit    2 weeks ago Bilateral hearing loss, unspecified hearing loss type    [address], [address] [facility],[address], Maurice Mo MD    Office Visit    1 month ago Otitis media of left ear in disease classified elsewhere    [address], [facility], [address] [place] [address], [facility],     Office Visit

## 2023-06-06 ENCOUNTER — PATIENT MESSAGE (OUTPATIENT)
Dept: AUDIOLOGY | Facility: CLINIC | Age: 68
End: 2023-06-06

## 2023-07-26 ENCOUNTER — OFFICE VISIT (OUTPATIENT)
Dept: AUDIOLOGY | Facility: CLINIC | Age: 68
End: 2023-07-26

## 2023-07-26 ENCOUNTER — OFFICE VISIT (OUTPATIENT)
Dept: OTOLARYNGOLOGY | Facility: CLINIC | Age: 68
End: 2023-07-26

## 2023-07-26 DIAGNOSIS — H90.6 MIXED HEARING LOSS, BILATERAL: Primary | ICD-10-CM

## 2023-07-26 DIAGNOSIS — H69.92 ETD (EUSTACHIAN TUBE DYSFUNCTION), LEFT: ICD-10-CM

## 2023-07-26 DIAGNOSIS — H72.92 PERFORATION OF LEFT TYMPANIC MEMBRANE: ICD-10-CM

## 2023-07-26 DIAGNOSIS — H90.A32 MIXED CONDUCTIVE AND SENSORINEURAL HEARING LOSS OF LEFT EAR WITH RESTRICTED HEARING OF RIGHT EAR: Primary | ICD-10-CM

## 2023-07-26 PROCEDURE — 92504 EAR MICROSCOPY EXAMINATION: CPT | Performed by: OTOLARYNGOLOGY

## 2023-07-26 PROCEDURE — 92567 TYMPANOMETRY: CPT | Performed by: AUDIOLOGIST

## 2023-07-26 PROCEDURE — 99214 OFFICE O/P EST MOD 30 MIN: CPT | Performed by: OTOLARYNGOLOGY

## 2023-07-26 PROCEDURE — 92511 NASOPHARYNGOSCOPY: CPT | Performed by: OTOLARYNGOLOGY

## 2023-07-26 PROCEDURE — 92553 AUDIOMETRY AIR & BONE: CPT | Performed by: AUDIOLOGIST

## 2023-07-26 NOTE — PROGRESS NOTES
NEW PATIENT PROGRESS NOTE  OTOLOGY/OTOLARYNGOLOGY    REF MD:  Teresa Ma  [address],  [address]     PCP: Melly May. DO Mary    CHIEF COMPLAINT:  Patient presents with: Follow - Up: Pt reports his hearing loss is getting worse. HISTORY OF PRESENT ILLNESS: Stacey Avalos is a 76year old male who presents for evaluation of tympanic membrane perforation. The patient was seen by Dr. Matt Sethi about 3-4 months ago and had persistent effusion in the left ear after a URI. This had happened to the patient in the past and would take a very long time to clear. It was therefore recommended he undergo myringotomy. The patient underwent this procedure with improvement to pressure/fluid, but he noted his hearing worsened and has not improved. Patient has bilateral subjective non-pulsatile tinntius. Denies vertigo, dizziness, otorrhea, otalgia or previous otologic procedure. 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 He has a history of many ear infections when he was younger. PAST MEDICAL HISTORY:    Past Medical History:   Diagnosis Date    Other and unspecified hyperlipidemia     SVT (supraventricular tachycardia) ([address]     Unspecified essential hypertension        PAST SURGICAL HISTORY:    Past Surgical History:   Procedure Laterality Date    OTHER SURGICAL HISTORY  02/17    heart ablation        telmisartan 80 MG Oral Tab, Take 1 tablet (80 mg total) by mouth daily. , Disp: 90 tablet, Rfl: 3  ezetimibe (ZETIA) 10 MG Oral Tab, Take 1 tablet (10 mg total) by mouth daily. , Disp: 90 tablet, Rfl: 1  Budesonide-Formoterol Fumarate (SYMBICORT) 160-4.5 MCG/ACT Inhalation Aerosol, Inhale 2 puffs into the lungs 2 (two) times daily. , Disp: 3 each, Rfl: 3  methylPREDNISolone 4 MG Oral Tablet Therapy Pack, Take by oral route., Disp: 21 tablet, Rfl: 0  fluticasone propionate 50 MCG/ACT Nasal Suspension, 2 sprays by Nasal route daily.  Squeeze nose after sprays and do not snort it into the back of your throat., Disp: 1 each, Rfl: 3  guaiFENesin-codeine (CHERATUSSIN AC) 100-10 MG/5ML Oral Solution, Take 10 mL by mouth every 6 (six) hours as needed for cough. , Disp: 236 mL, Rfl: 0  methylPREDNISolone (MEDROL) 4 MG Oral Tablet Therapy Pack, As directed., Disp: 1 each, Rfl: 1  fluticasone propionate 50 MCG/ACT Nasal Suspension, 2 sprays by Nasal route daily. Squeeze nose after sprays and do not snort it into the back of your throat., Disp: 1 each, Rfl: 5    No current facility-administered medications on file prior to visit. Allergies:   Lisinopril              ANAPHYLAXIS  Pravastatin             PAIN    Comment:Severe joint pain. SOCIAL HISTORY:  Social History    Tobacco Use      Smoking status: Never      Smokeless tobacco: Never    Alcohol use: Yes      Alcohol/week: 0.0 standard drinks of alcohol      Comment: Occasionally      FAMILY HISTORY: Denies known family history of hearing loss, tinnitus, vertigo, or migraine. Denies known family history of head and neck cancer, thyroid cancer, bleeding disorders. REVIEW OF SYSTEMS:   Positives are in bold  Neuro: Headache, facial weakness, facial numbness, neck pain, vertigo  ENT: Hearing change, tinnitus, otorrhea, otalgia, aural fullness, ear pressure, vertigo, imbalance  Sinus pressure, rhinorrhea, congestion, facial pain, jaw pain, dysphagia, odynophagia, sore throat, voice changes, shortness of breath    EXAMINATION:  I washed my hands with an alcohol-based hand gel prior to examination  Constitutional:   --Vitals: There were no vitals taken for this visit. --General: no apparent distress, well-developed, conversant  Psych: affect pleasant and appropriate for age, alert and oriented  Neuro: Facial movement normal bilateral  Eyes: Pupils equal, symmetric and reactive to light.   Extra-ocular muscles intact  Respiratory: No stridor, stertor or increased work of breathing  ENT:  --Nose: nasal tip pointed to the left, no external nasal valve collapse, bilateral anterior septal deviation, it is not caudal, right worse than left, mucosa healthy, no inferior turbinate hypertrophy  --Ear: The bilateral ears were examined under binocular microscopy  Right ear microscopic exam:  Pinna: Normal, no lesions or masses. Mastoid: Nontender on palpation. External auditory canal: Clear, no masses or lesions. Tympanic membrane: Intact, no lesions, normal landmarks. Middle ear: Aerated. Left ear microscopic exam:  Pinna: Normal, no lesions or masses. Mastoid: Nontender on palpation. External auditory canal: Clear, no masses or lesions. Tympanic membrane: 30% inferior tympanic membrane perforation visible through the ear canal, no lesions, normal landmarks. Ample sized ear canal.  Middle ear: Aerated. Nasopharyngoscopy 07/26/23:  Informed consent obtained, patient was correctly identified  Topical anesthesia with aerosolized lidocaine and ephedrine spray in the bilateral nares  Findings: The flexible scope was advanced into the bilateral nares via the inferior meatus into the nasopharynx. Bilateral anterior septal deviation, but not in the caudal septum. More severe on the right (touching the inferior turbinate) than on the left. Beyond the septal deviation which is localized the left inferior nasal passage is clear. The bilateral eustachian tubes are patent. No masses or lesions in the bilateral nasopharynx or fossae of Rosenmueller.   Complications none, procedure well tolerated    Audiogram (date 7-) interpreted and reviewed with the patient today showing:  Puretones: Right: normal to moderate to mild to moderate-severe SNHL Left: moderate to mild to severe mixed hearing loss   Tymps: Right: A Left: B  SRT: Right: 40 Left: 60  Prior exam SD: 100% bilaterally    Latest Audiogram Result (Hz) Exam performed: 7/26/2023 11:22 AM Last edited by Magdalena Shaikh on 7/26/2023 11:29 AM        125 250  1500 2000 3000 4000 6000 8000    Right air:  20 50 50 50 30 25 40 40 65 55    Left air:  65 80 75 55 55 70 65 70 75 85    Left mastoid bone (masked):   60 60 40 35 70 45 50         Reliability:  Good    Transducer: Inserts    Technique:  Conventional Audiometry    Comments:            Latest Speech Audiometry  Last edited by Magdalena Yoo on 7/26/2023 11:26 AM       Ear Method SAT SRT MCL UCL Notes    right live voice  40       left live voice  60        Ear Method Test/List Score (%) Intensity Mask/Noise   right live voice       left live voice                         Latest Tympanogram Result       Probe Tone (Hz): Unknown Exam performed: 7/26/2023 11:22 AM Last edited by Magdalena Yoo on 7/26/2023 11:29 AM      Tympanograms  These were drawn by a user, not generated from device data      Right Ear Left Ear                     Right Ear Left Ear    Tympanogram type:      Canal volume (mL): 1.8 3.7    Peak pressure (daPa): -13     Peak amplitude (mL): 0.23     Tympanogram width (daPa): Comments:                    Latest Audiogram and Tympanogram Result Text  Last edited by Magdalena Yoo on 7/26/2023 11:29 AM      Study Result                 Narrative & Impression  Follow up audiogram shows decrease in left ear air conduction especially in lower frequencies, compared to last audiogram on 5-12-23. Left tympanogram is flat with large ear canal volume, similar to results from 5-12-23. Follow up with Dr. Marylen Craze.                     ASSESSMENT/PLAN:  Jaqueline Becker is a 76year old male with   (H90.A32) Mixed conductive and sensorineural hearing loss of left ear with restricted hearing of right ear  (primary encounter diagnosis)  (H72.92) Perforation of left tympanic membrane  (H69.92) ETD (Eustachian tube dysfunction), left     IMPRESSION:  Left tympanic membrane perforation  Left eustachian tube dysfunction  Left mixed hearing loss  Right SNHL  Bilateral tinnitus, subjective, non-pulsatile PLAN:  -Audiogram reviewed with patient   -Recommend CT temporal bone w/o contrast prior to surgery in light of eustachian tube dysfunction and history of ear infections  -Recommend left tympanoplasty (likely transcanal, possible endaural), possible cartilage graft, possible ossicular chain reconstruction AND left eustachian tube balloon dilation   -Patient will still need bilateral hearing aids after surgery  -Discussed the details of surgery and postoperative care. Discussed that this is typically an outpatient surgery in healthy patients. Surgery typically will take 1-2 hours. Hearing will not be re-evaluated until 8-10 weeks postoperatively. We discussed the postoperative appt schedule and restrictions.  -Risks of surgery are noted to include, but are not limited to hearing loss including tympanoplasty failure/failure of the perforation to resolve (10%), recurrence of eustachian tube dysfunction, patulous eustachian tube, damage to the nose, hearing loss, vertigo, tinnitus exacerbation, bleeding, infection, facial nerve injury resulting in temporary or permenant facial weakness, taste disturbance (temporary or permanent), need for revision surgery, damage to surrounding structure, anesthetic risk, and other unforseen complications  -Patient will await hearing from surgical scheduling to select a surgery date. He needs medical and cardiac clearance prior to surgery.    -Patient also with nasal breathing issues - may benefit from septoplasty and functional rhinoplasty vs isolated left internal nasal valve treatment. Will further consider this after ear surgery. Situation reviewed with the patient in detail. Michael Miner MD  Otology/Otolaryngology  [facility]   [address] [facility],[address]  Phone [phone]  Fax [phone]     Surgery scheduling instructions    Surgery: left tympanoplasty (likely transcanal, possible endaural), possible cartilage graft, possible ossicular chain reconstruction AND left eustachian tube balloon dilation, facial nerve monitoring (CPT 12817 (or possible [id number]), 21235, 19951    Duration: 2.5 hours     Diagnosis: (H90.A32) Mixed conductive and sensorineural hearing loss of left ear with restricted hearing of right ear  (primary encounter diagnosis)  (H72.92) Perforation of left tympanic membrane  (H69.92) ETD (Eustachian tube dysfunction), left     Special equipment: microscope, ear trays, cartilage cutter, facial nerve monitoring, middle ear prosthetics, tower/sinus endoscopes, accelerant eustachian tube balloon dilator    Anesthesia: general endotracheal    Admission: no    Place of surgery: [facility] OR    Pre operative work up needed: PCP and cardiac

## 2023-07-26 NOTE — H&P (VIEW-ONLY)
NEW PATIENT PROGRESS NOTE  OTOLOGY/OTOLARYNGOLOGY    REF MD:  Yun Nathan, Perla Brothers  [address],  [address]     PCP: Jennifer Yepez. DO Mary    CHIEF COMPLAINT:  Patient presents with: Follow - Up: Pt reports his hearing loss is getting worse. HISTORY OF PRESENT ILLNESS: Danielle Zamorano is a 76year old male who presents for evaluation of tympanic membrane perforation. The patient was seen by Dr. Baylee Camacho about 3-4 months ago and had persistent effusion in the left ear after a URI. This had happened to the patient in the past and would take a very long time to clear. It was therefore recommended he undergo myringotomy. The patient underwent this procedure with improvement to pressure/fluid, but he noted his hearing worsened and has not improved. Patient has bilateral subjective non-pulsatile tinntius. Denies vertigo, dizziness, otorrhea, otalgia or previous otologic procedure. 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 He has a history of many ear infections when he was younger. PAST MEDICAL HISTORY:    Past Medical History:   Diagnosis Date    Other and unspecified hyperlipidemia     SVT (supraventricular tachycardia) ([address]     Unspecified essential hypertension        PAST SURGICAL HISTORY:    Past Surgical History:   Procedure Laterality Date    OTHER SURGICAL HISTORY  02/17    heart ablation        telmisartan 80 MG Oral Tab, Take 1 tablet (80 mg total) by mouth daily. , Disp: 90 tablet, Rfl: 3  ezetimibe (ZETIA) 10 MG Oral Tab, Take 1 tablet (10 mg total) by mouth daily. , Disp: 90 tablet, Rfl: 1  Budesonide-Formoterol Fumarate (SYMBICORT) 160-4.5 MCG/ACT Inhalation Aerosol, Inhale 2 puffs into the lungs 2 (two) times daily. , Disp: 3 each, Rfl: 3  methylPREDNISolone 4 MG Oral Tablet Therapy Pack, Take by oral route., Disp: 21 tablet, Rfl: 0  fluticasone propionate 50 MCG/ACT Nasal Suspension, 2 sprays by Nasal route daily.  Squeeze nose after sprays and do not snort PRBCs administered. Pt consent in chart. Risks and benefits of administering product explained to pt. Pt verbalized understanding of risks and benefits. VSS. Second RN at beside for confirmation of product and pt identification. Will cont to monitor. it into the back of your throat., Disp: 1 each, Rfl: 3  guaiFENesin-codeine (CHERATUSSIN AC) 100-10 MG/5ML Oral Solution, Take 10 mL by mouth every 6 (six) hours as needed for cough. , Disp: 236 mL, Rfl: 0  methylPREDNISolone (MEDROL) 4 MG Oral Tablet Therapy Pack, As directed., Disp: 1 each, Rfl: 1  fluticasone propionate 50 MCG/ACT Nasal Suspension, 2 sprays by Nasal route daily. Squeeze nose after sprays and do not snort it into the back of your throat., Disp: 1 each, Rfl: 5    No current facility-administered medications on file prior to visit. Allergies:   Lisinopril              ANAPHYLAXIS  Pravastatin             PAIN    Comment:Severe joint pain. SOCIAL HISTORY:  Social History    Tobacco Use      Smoking status: Never      Smokeless tobacco: Never    Alcohol use: Yes      Alcohol/week: 0.0 standard drinks of alcohol      Comment: Occasionally      FAMILY HISTORY: Denies known family history of hearing loss, tinnitus, vertigo, or migraine. Denies known family history of head and neck cancer, thyroid cancer, bleeding disorders. REVIEW OF SYSTEMS:   Positives are in bold  Neuro: Headache, facial weakness, facial numbness, neck pain, vertigo  ENT: Hearing change, tinnitus, otorrhea, otalgia, aural fullness, ear pressure, vertigo, imbalance  Sinus pressure, rhinorrhea, congestion, facial pain, jaw pain, dysphagia, odynophagia, sore throat, voice changes, shortness of breath    EXAMINATION:  I washed my hands with an alcohol-based hand gel prior to examination  Constitutional:   --Vitals: There were no vitals taken for this visit. --General: no apparent distress, well-developed, conversant  Psych: affect pleasant and appropriate for age, alert and oriented  Neuro: Facial movement normal bilateral  Eyes: Pupils equal, symmetric and reactive to light.   Extra-ocular muscles intact  Respiratory: No stridor, stertor or increased work of breathing  ENT:  --Nose: nasal tip pointed to the left, no external nasal valve collapse, bilateral anterior septal deviation, it is not caudal, right worse than left, mucosa healthy, no inferior turbinate hypertrophy  --Ear: The bilateral ears were examined under binocular microscopy  Right ear microscopic exam:  Pinna: Normal, no lesions or masses. Mastoid: Nontender on palpation. External auditory canal: Clear, no masses or lesions. Tympanic membrane: Intact, no lesions, normal landmarks. Middle ear: Aerated. Left ear microscopic exam:  Pinna: Normal, no lesions or masses. Mastoid: Nontender on palpation. External auditory canal: Clear, no masses or lesions. Tympanic membrane: 30% inferior tympanic membrane perforation visible through the ear canal, no lesions, normal landmarks. Ample sized ear canal.  Middle ear: Aerated. Nasopharyngoscopy 07/26/23:  Informed consent obtained, patient was correctly identified  Topical anesthesia with aerosolized lidocaine and ephedrine spray in the bilateral nares  Findings: The flexible scope was advanced into the bilateral nares via the inferior meatus into the nasopharynx. Bilateral anterior septal deviation, but not in the caudal septum. More severe on the right (touching the inferior turbinate) than on the left. Beyond the septal deviation which is localized the left inferior nasal passage is clear. The bilateral eustachian tubes are patent. No masses or lesions in the bilateral nasopharynx or fossae of Rosenmueller.   Complications none, procedure well tolerated    Audiogram (date 7-) interpreted and reviewed with the patient today showing:  Puretones: Right: normal to moderate to mild to moderate-severe SNHL Left: moderate to mild to severe mixed hearing loss   Tymps: Right: A Left: B  SRT: Right: 40 Left: 60  Prior exam SD: 100% bilaterally    Latest Audiogram Result (Hz) Exam performed: 7/26/2023 11:22 AM Last edited by Magdalena Shaikh on 7/26/2023 11:29 AM        125 250  1500 2000 3000 4000 6000 8000    Right air:  20 50 50 50 30 25 40 40 65 55    Left air:  65 80 75 55 55 70 65 70 75 85    Left mastoid bone (masked):   60 60 40 35 70 45 50         Reliability:  Good    Transducer: Inserts    Technique:  Conventional Audiometry    Comments:            Latest Speech Audiometry  Last edited by Magdalena Nielson on 7/26/2023 11:26 AM       Ear Method SAT SRT MCL UCL Notes    right live voice  40       left live voice  60        Ear Method Test/List Score (%) Intensity Mask/Noise   right live voice       left live voice                         Latest Tympanogram Result       Probe Tone (Hz): Unknown Exam performed: 7/26/2023 11:22 AM Last edited by Magdalena Nielson on 7/26/2023 11:29 AM      Tympanograms  These were drawn by a user, not generated from device data      Right Ear Left Ear                     Right Ear Left Ear    Tympanogram type:      Canal volume (mL): 1.8 3.7    Peak pressure (daPa): -13     Peak amplitude (mL): 0.23     Tympanogram width (daPa): Comments:                    Latest Audiogram and Tympanogram Result Text  Last edited by Magdalena Nielson on 7/26/2023 11:29 AM      Study Result                 Narrative & Impression  Follow up audiogram shows decrease in left ear air conduction especially in lower frequencies, compared to last audiogram on 5-12-23. Left tympanogram is flat with large ear canal volume, similar to results from 5-12-23. Follow up with Dr. Ajay Mendoza.                     ASSESSMENT/PLAN:  Domenico Amaro is a 76year old male with   (H90.A32) Mixed conductive and sensorineural hearing loss of left ear with restricted hearing of right ear  (primary encounter diagnosis)  (H72.92) Perforation of left tympanic membrane  (H69.92) ETD (Eustachian tube dysfunction), left     IMPRESSION:  Left tympanic membrane perforation  Left eustachian tube dysfunction  Left mixed hearing loss  Right SNHL  Bilateral tinnitus, subjective, non-pulsatile PLAN:  -Audiogram reviewed with patient   -Recommend CT temporal bone w/o contrast prior to surgery in light of eustachian tube dysfunction and history of ear infections  -Recommend left tympanoplasty (likely transcanal, possible endaural), possible cartilage graft, possible ossicular chain reconstruction AND left eustachian tube balloon dilation   -Patient will still need bilateral hearing aids after surgery  -Discussed the details of surgery and postoperative care. Discussed that this is typically an outpatient surgery in healthy patients. Surgery typically will take 1-2 hours. Hearing will not be re-evaluated until 8-10 weeks postoperatively. We discussed the postoperative appt schedule and restrictions.  -Risks of surgery are noted to include, but are not limited to hearing loss including tympanoplasty failure/failure of the perforation to resolve (10%), recurrence of eustachian tube dysfunction, patulous eustachian tube, damage to the nose, hearing loss, vertigo, tinnitus exacerbation, bleeding, infection, facial nerve injury resulting in temporary or permenant facial weakness, taste disturbance (temporary or permanent), need for revision surgery, damage to surrounding structure, anesthetic risk, and other unforseen complications  -Patient will await hearing from surgical scheduling to select a surgery date. He needs medical and cardiac clearance prior to surgery.    -Patient also with nasal breathing issues - may benefit from septoplasty and functional rhinoplasty vs isolated left internal nasal valve treatment. Will further consider this after ear surgery. Situation reviewed with the patient in detail. Cassidy Lynn MD  Otology/Otolaryngology  [facility]   [address] [facility],[address]  Phone [phone]  Fax [phone]     Surgery scheduling instructions    Surgery: left tympanoplasty (likely transcanal, possible endaural), possible cartilage graft, possible ossicular chain reconstruction AND left eustachian tube balloon dilation, facial nerve monitoring (CPT 89092 (or possible [id number]), 40440, 32054    Duration: 2.5 hours     Diagnosis: (H90.A32) Mixed conductive and sensorineural hearing loss of left ear with restricted hearing of right ear  (primary encounter diagnosis)  (H72.92) Perforation of left tympanic membrane  (H69.92) ETD (Eustachian tube dysfunction), left     Special equipment: microscope, ear trays, cartilage cutter, facial nerve monitoring, middle ear prosthetics, tower/sinus endoscopes, accelerant eustachian tube balloon dilator    Anesthesia: general endotracheal    Admission: no    Place of surgery: [facility] OR    Pre operative work up needed: PCP and cardiac

## 2023-07-27 ENCOUNTER — HOSPITAL ENCOUNTER (OUTPATIENT)
Dept: CT IMAGING | Age: 68
Discharge: HOME | End: 2023-07-27
Attending: OTOLARYNGOLOGY
Payer: COMMERCIAL

## 2023-07-27 DIAGNOSIS — H90.A32 MIXED CONDUCTIVE AND SENSORINEURAL HEARING LOSS OF LEFT EAR WITH RESTRICTED HEARING OF RIGHT EAR: ICD-10-CM

## 2023-07-27 PROCEDURE — 70480 CT ORBIT/EAR/FOSSA W/O DYE: CPT | Performed by: OTOLARYNGOLOGY

## 2023-07-28 ENCOUNTER — TELEPHONE (OUTPATIENT)
Dept: OTOLARYNGOLOGY | Facility: CLINIC | Age: 68
End: 2023-07-28

## 2023-07-31 DIAGNOSIS — H72.92 TYMPANIC MEMBRANE PERFORATION, LEFT: ICD-10-CM

## 2023-07-31 DIAGNOSIS — H69.92 DISORDER OF LEFT EUSTACHIAN TUBE: ICD-10-CM

## 2023-07-31 DIAGNOSIS — H90.A32 MIXED CONDUCTIVE AND SENSORINEURAL HEARING LOSS OF LEFT EAR WITH RESTRICTED HEARING OF RIGHT EAR: Primary | ICD-10-CM

## 2023-08-03 ENCOUNTER — OFFICE VISIT (OUTPATIENT)
Dept: FAMILY MEDICINE CLINIC | Facility: CLINIC | Age: 68
End: 2023-08-03

## 2023-08-03 ENCOUNTER — EKG ENCOUNTER (OUTPATIENT)
Dept: LAB | Age: 68
End: 2023-08-03
Attending: FAMILY MEDICINE
Payer: COMMERCIAL

## 2023-08-03 VITALS
DIASTOLIC BLOOD PRESSURE: 82 MMHG | HEART RATE: 61 BPM | SYSTOLIC BLOOD PRESSURE: 134 MMHG | WEIGHT: 299 LBS | HEIGHT: 73 IN | BODY MASS INDEX: 39.63 KG/M2

## 2023-08-03 DIAGNOSIS — E78.5 HYPERLIPIDEMIA, UNSPECIFIED HYPERLIPIDEMIA TYPE: ICD-10-CM

## 2023-08-03 DIAGNOSIS — G47.33 OSA (OBSTRUCTIVE SLEEP APNEA): ICD-10-CM

## 2023-08-03 DIAGNOSIS — Z86.79 HISTORY OF PSVT (PAROXYSMAL SUPRAVENTRICULAR TACHYCARDIA): ICD-10-CM

## 2023-08-03 DIAGNOSIS — Z86.79 HISTORY OF CARDIAC ARRHYTHMIA: ICD-10-CM

## 2023-08-03 DIAGNOSIS — Z86.79 HISTORY OF PSVT (PAROXYSMAL SUPRAVENTRICULAR TACHYCARDIA): Primary | ICD-10-CM

## 2023-08-03 DIAGNOSIS — H65.92 OTITIS MEDIA, SEROUS, TM RUPTURE, LEFT: ICD-10-CM

## 2023-08-03 DIAGNOSIS — H72.92 OTITIS MEDIA, SEROUS, TM RUPTURE, LEFT: ICD-10-CM

## 2023-08-03 DIAGNOSIS — K21.9 CHRONIC GERD: ICD-10-CM

## 2023-08-03 DIAGNOSIS — I10 ESSENTIAL HYPERTENSION WITH GOAL BLOOD PRESSURE LESS THAN 130/80: ICD-10-CM

## 2023-08-03 DIAGNOSIS — D22.9 ATYPICAL MOLE: ICD-10-CM

## 2023-08-03 DIAGNOSIS — J43.8 OTHER EMPHYSEMA (HCC): ICD-10-CM

## 2023-08-03 DIAGNOSIS — Z01.818 PREOP GENERAL PHYSICAL EXAM: ICD-10-CM

## 2023-08-03 LAB
ATRIAL RATE: 0 BPM
ATRIAL RATE: 58 BPM
P AXIS: 0 DEGREES
P AXIS: 30 DEGREES
P-R INTERVAL: 150 MS
Q-T INTERVAL: 0 MS
Q-T INTERVAL: 442 MS
QRS DURATION: 0 MS
QRS DURATION: 78 MS
QTC CALCULATION (BEZET): 0 MS
QTC CALCULATION (BEZET): 433 MS
R AXIS: 0 DEGREES
R AXIS: 12 DEGREES
T AXIS: 0 DEGREES
T AXIS: 29 DEGREES
VENTRICULAR RATE: 0 BPM
VENTRICULAR RATE: 58 BPM

## 2023-08-03 PROCEDURE — 3079F DIAST BP 80-89 MM HG: CPT | Performed by: FAMILY MEDICINE

## 2023-08-03 PROCEDURE — 3075F SYST BP GE 130 - 139MM HG: CPT | Performed by: FAMILY MEDICINE

## 2023-08-03 PROCEDURE — 93005 ELECTROCARDIOGRAM TRACING: CPT

## 2023-08-03 PROCEDURE — 99213 OFFICE O/P EST LOW 20 MIN: CPT | Performed by: FAMILY MEDICINE

## 2023-08-03 PROCEDURE — 90677 PCV20 VACCINE IM: CPT | Performed by: FAMILY MEDICINE

## 2023-08-03 PROCEDURE — 93010 ELECTROCARDIOGRAM REPORT: CPT | Performed by: INTERNAL MEDICINE

## 2023-08-03 PROCEDURE — 90471 IMMUNIZATION ADMIN: CPT | Performed by: FAMILY MEDICINE

## 2023-08-03 PROCEDURE — 3008F BODY MASS INDEX DOCD: CPT | Performed by: FAMILY MEDICINE

## 2023-08-07 ENCOUNTER — OFFICE VISIT (OUTPATIENT)
Dept: AUDIOLOGY | Facility: CLINIC | Age: 68
End: 2023-08-07

## 2023-08-07 DIAGNOSIS — H90.6 MIXED CONDUCTIVE AND SENSORINEURAL HEARING LOSS OF BOTH EARS: Primary | ICD-10-CM

## 2023-08-07 NOTE — PROGRESS NOTES
Hearing Aid Evaluation    Iliana Vinson  6/9/1955  [id number]    Iliana Vinson is a first time user. He currently has a mixed hearing loss on his left ear due to a tympanic membrane perforation. He will be having surgery for repair of this on August 23rd, 2023 by Dr. Yuliya Villanueva. He will have residual hearing loss after the repair and will need hearing aids. He has difficulty hearing when in a large group or meeting. His insurance covers a total of $5000 for hearing aids. The following were discussed with [employer]:    Explanation of Audiogram  Patient's hearing loss  Communication difficulties  Importance of binaural instruments  Performance of noise  Benefits/limitations of style  Adjustment period and follow up visits    Hearing Aid Features  Automatic selection  Dual microphones  Noise suppression  Compatible with FM devices  Compatible with Bluetooth    Charges Associated with Hearing Aids  Hearing aid evaluation  Hearing aid(s)  Custom earmolds  Payment in full at delivery  Office visits after service agreement ends      Medical clearance was given by Micheline Douglas MD.    Myra Love impressions were taken today without incident.      Patient's Choice  Level of technology: Premium  Fitting: binaural    Hearing Aid Ordered       Right/Left    Yahaira Machuca L90-R    #2 Moderate Receivers    Champagne    C-Shells         Patient has sent an appointment for  on August 18th, 2023.    8/7/2023  ELLEN Magana

## 2023-08-08 ENCOUNTER — OFFICE VISIT (OUTPATIENT)
Dept: DERMATOLOGY CLINIC | Facility: CLINIC | Age: 68
End: 2023-08-08

## 2023-08-08 DIAGNOSIS — L81.4 LENTIGINES: Primary | ICD-10-CM

## 2023-08-08 DIAGNOSIS — D49.2 NEOPLASM OF UNSPECIFIED BEHAVIOR OF BONE, SOFT TISSUE, AND SKIN: ICD-10-CM

## 2023-08-08 DIAGNOSIS — L57.0 MULTIPLE ACTINIC KERATOSES: ICD-10-CM

## 2023-08-08 PROCEDURE — 88305 TISSUE EXAM BY PATHOLOGIST: CPT | Performed by: STUDENT IN AN ORGANIZED HEALTH CARE EDUCATION/TRAINING PROGRAM

## 2023-08-08 PROCEDURE — 11103 TANGNTL BX SKIN EA SEP/ADDL: CPT | Performed by: STUDENT IN AN ORGANIZED HEALTH CARE EDUCATION/TRAINING PROGRAM

## 2023-08-08 PROCEDURE — 17003 DESTRUCT PREMALG LES 2-14: CPT | Performed by: STUDENT IN AN ORGANIZED HEALTH CARE EDUCATION/TRAINING PROGRAM

## 2023-08-08 PROCEDURE — 17000 DESTRUCT PREMALG LESION: CPT | Performed by: STUDENT IN AN ORGANIZED HEALTH CARE EDUCATION/TRAINING PROGRAM

## 2023-08-08 PROCEDURE — 99203 OFFICE O/P NEW LOW 30 MIN: CPT | Performed by: STUDENT IN AN ORGANIZED HEALTH CARE EDUCATION/TRAINING PROGRAM

## 2023-08-08 PROCEDURE — 11102 TANGNTL BX SKIN SINGLE LES: CPT | Performed by: STUDENT IN AN ORGANIZED HEALTH CARE EDUCATION/TRAINING PROGRAM

## 2023-08-08 NOTE — PROGRESS NOTES
New Patient    Referred by: No referring provider defined for this encounter. CHIEF COMPLAINT: Lesion of concern     HISTORY OF PRESENT ILLNESS: Misty Huizar is a 76year old male here for evaluation of lesion of concern. 1. Lesions   Location: forearms  Duration: A year  Signs and symptoms: None  Current treatment: none    Personal Dermatologic History  History of skin cancer: No  History of  atypical moles: No    FAMILY HISTORY:  History of melanoma: Yes, father and brothers    Past Medical History  Past Medical History:   Diagnosis Date    Other and unspecified hyperlipidemia     SVT (supraventricular tachycardia) ([facility] [address]     Unspecified essential hypertension        REVIEW OF SYSTEMS:  Constitutional: Denies fever, chills, unintentional weight loss. Skin as per HPI    Medications  Current Outpatient Medications   Medication Sig Dispense Refill    telmisartan 80 MG Oral Tab Take 1 tablet (80 mg total) by mouth daily. 90 tablet 3    Budesonide-Formoterol Fumarate (SYMBICORT) 160-4.5 MCG/ACT Inhalation Aerosol Inhale 2 puffs into the lungs 2 (two) times daily. 3 each 3    fluticasone propionate 50 MCG/ACT Nasal Suspension 2 sprays by Nasal route daily. Squeeze nose after sprays and do not snort it into the back of your throat. 1 each 3    guaiFENesin-codeine (CHERATUSSIN AC) 100-10 MG/5ML Oral Solution Take 10 mL by mouth every 6 (six) hours as needed for cough. 236 mL 0    fluticasone propionate 50 MCG/ACT Nasal Suspension 2 sprays by Nasal route daily. Squeeze nose after sprays and do not snort it into the back of your throat. 1 each 5       PHYSICAL EXAM:  General: awake, alert, no acute distress  Neuropsych: appropriate mood and affect  Eyes: Sclerae anicteric, without conjunctival injection, eyelids unremarkable  Skin: Skin exam was performed today including the following: L forearm and face.  Pertinent findings include:   - L distal forearm with a 2.1cmx1.1cm pink scaly plaques  - L proximal forearm with a pink indurated plaque    ASSESSMENT & PLAN:  Pathophysiology of diagnoses discussed with patient. Therapeutic options reviewed. Risks, benefits, and alternatives discussed with patient. Instructions reviewed at length. #Neoplasm(s) of uncertain behavior of skin  - Shave biopsy performed today   - Will notify patient with results and arrange for appropriate definitive treatment, if indicated. Shave of lesion to establish and confirm diagnosis:  Photo taken: Yes    Risks, benefits, alternatives and personnel required for shave biopsy reviewed with patient. Risks discussed include, but not limited to: pain, bleeding, infection, scar, reaction to anesthetic, and recurrence/need for further treatment. Patient and physician agree as to site(s) to be biopsied. Patient verbalizes understanding and wishes to proceed. Site(s) prepped with alcohol and anesthetized with 1% lidocaine with epinephrine. Shave of lesion(s) performed to the level of the dermis. Specimen(s) from A.L forearm distal, B. L forearm proximal  sent for pathology to r/o NMSC 50% ALCL and bandaging applied. Written and verbal wound care instructions provided to patient, understanding verbalized. #Lentigines  - Discussed benign appearance and provided reassurance. No treatment but observation at this time. Follow-up for concerning physical changes or new symptoms.  - Recommend sun protection with spf 30 or higher, sun protective clothing such as wide brimmed hats and long sleeves. Recommend avoiding midday sun (10 am- 3 pm). #Multiple actinic keratoses  - Discussed premalignant etiology and possibility of transformation to [facility]  - Recommended cryotherapy today   - Discussed side effects including redness, swelling, crusting, and discolortion after treatment, wound care with soap/water and vaseline   - Recommend sun protection with spf 30 or higher, sun protective clothing such as wide brimmed hats and long sleeves. Recommend avoiding midday sun (10 am- 3 pm). - Procedure Note Cryosurgery of pre-malignant lesion(s)  Risks, benefits, alternatives, complications, and personnel required for cryosurgery reviewed with patient. Patient verbalizes understanding and wishes to proceed. - Cryosurgery performed with Liquid Nitrogen via cryostat spray gun to Actinic Keratosis . 4 lesion(s) treated. - Patient tolerated well and wound care discussed. Return if lesions fail to fully resolve.       Return to clinic: 6 months or sooner if something concerning arises     Gustavo Pimentel MD

## 2023-08-11 ENCOUNTER — ANESTHESIA EVENT (OUTPATIENT)
Dept: SURGERY | Facility: HOSPITAL | Age: 68
End: 2023-08-11
Payer: COMMERCIAL

## 2023-08-15 ENCOUNTER — PATIENT MESSAGE (OUTPATIENT)
Dept: DERMATOLOGY CLINIC | Facility: CLINIC | Age: 68
End: 2023-08-15

## 2023-08-15 DIAGNOSIS — C44.611: Primary | ICD-10-CM

## 2023-08-15 NOTE — TELEPHONE ENCOUNTER
From: Aryan Marte  To: Uziel Barlow MD  Sent: 8/15/2023 1:07 PM CDT  Subject: Next steps after results     Just wondering what next steps are after results of skin samples taken?

## 2023-08-16 ENCOUNTER — PATIENT MESSAGE (OUTPATIENT)
Dept: FAMILY MEDICINE CLINIC | Facility: CLINIC | Age: 68
End: 2023-08-16

## 2023-08-16 DIAGNOSIS — D48.9 NEOPLASM OF UNCERTAIN BEHAVIOR: ICD-10-CM

## 2023-08-16 DIAGNOSIS — D22.9 ATYPICAL MOLE: Primary | ICD-10-CM

## 2023-08-16 DIAGNOSIS — L81.4 LENTIGINES: ICD-10-CM

## 2023-08-16 DIAGNOSIS — L57.0 ACTINIC KERATOSES: ICD-10-CM

## 2023-08-16 NOTE — TELEPHONE ENCOUNTER
From: Lanre Melchor  To: Wade Hernandez,   Sent: 8/16/2023 9:19 AM CDT  Subject: Dr Sabas Holt want to see me every 6 month for full body inspection. Need referral.

## 2023-08-17 ENCOUNTER — TELEPHONE (OUTPATIENT)
Dept: OTOLARYNGOLOGY | Facility: CLINIC | Age: 68
End: 2023-08-17

## 2023-08-17 DIAGNOSIS — H90.A32 MIXED CONDUCTIVE AND SENSORINEURAL HEARING LOSS OF LEFT EAR WITH RESTRICTED HEARING OF RIGHT EAR: Primary | ICD-10-CM

## 2023-08-18 ENCOUNTER — OFFICE VISIT (OUTPATIENT)
Dept: AUDIOLOGY | Facility: CLINIC | Age: 68
End: 2023-08-18

## 2023-08-18 DIAGNOSIS — H90.6 MIXED CONDUCTIVE AND SENSORINEURAL HEARING LOSS OF BOTH EARS: Primary | ICD-10-CM

## 2023-08-18 NOTE — PROGRESS NOTES
Hearing Aid [address] [facility] purchased two hearing aids. The hearing aid fitting was completed by ELLEN Gardner. Hearing Aid Information       Right     Left    Yahaira Cruz [facility] [id number]    [id number]  [id number]    Merline Bern    Moderate #2 speaker Moderate #2 speaker    C-Shell [id number] C-Shell [id number]    [facility] [id number] ACC [id number]    Cerustop Cerustop       Good fit and comfort of earmolds and hearing aids. Pt was readily able to insert and remove aids properly from his ears. No programs entered at this time  Aids were paired to patients Apple phone and he was able to accept a call in the office today. Right ear:  Real Ear Measurements (REM) were taken today and aids were found to be meeting NAL NL2 targets for soft, average and loud speech. Aids do not exceed MPO. Left ear: Aid found to be below targets today. Probe guide was not able to used accurately due to state of his left middle ear. Pt is having surgery next week. REM will be repeated in the left ear following medical management. The following items were demonstrated to the patient:  Insertion/removal of hearing aid into ear  Function and operation of controls  Telephone use  Cleaning of hearing aid/earmold  Storage of hearing aids      The patient was informed of or received the following:  Adjustment period and realistic expectations  Cleaning tools  Hearing aid instruction book   Carrying case  Purchase agreement  Repair/loss coverage  Trial period/return policy  Service period  Medical clearance    Patient expressed understanding to all discussed topics. Dispensing agreement signed. Charges billed to patient's insurance.           Follow-up scheduled for September 18th, 2023.      08/18/23  ELLEN Gardner

## 2023-08-22 ENCOUNTER — HOSPITAL ENCOUNTER (OUTPATIENT)
Facility: HOSPITAL | Age: 68
Setting detail: HOSPITAL OUTPATIENT SURGERY
Discharge: HOME | End: 2023-08-22
Attending: OTOLARYNGOLOGY | Admitting: OTOLARYNGOLOGY
Payer: COMMERCIAL

## 2023-08-22 VITALS
SYSTOLIC BLOOD PRESSURE: 164 MMHG | HEART RATE: 92 BPM | HEIGHT: 73 IN | OXYGEN SATURATION: 93 % | DIASTOLIC BLOOD PRESSURE: 88 MMHG | WEIGHT: 298 LBS | BODY MASS INDEX: 39.49 KG/M2 | TEMPERATURE: 98 F | RESPIRATION RATE: 16 BRPM

## 2023-08-22 PROBLEM — H69.92 ETD (EUSTACHIAN TUBE DYSFUNCTION), LEFT: Status: ACTIVE | Noted: 2023-08-22

## 2023-08-22 PROBLEM — H90.A32 MIXED CONDUCTIVE AND SENSORINEURAL HEARING LOSS OF LEFT EAR WITH RESTRICTED HEARING OF RIGHT EAR: Status: ACTIVE | Noted: 2023-08-22

## 2023-08-22 PROBLEM — H72.02 TYMPANIC MEMBRANE CENTRAL PERFORATION, LEFT: Status: ACTIVE | Noted: 2023-08-22

## 2023-08-22 PROCEDURE — 8E09XBZ COMPUTER ASSISTED PROCEDURE OF HEAD AND NECK REGION: ICD-10-PCS | Performed by: OTOLARYNGOLOGY

## 2023-08-22 PROCEDURE — 09U807Z SUPPLEMENT LEFT TYMPANIC MEMBRANE WITH AUTOLOGOUS TISSUE SUBSTITUTE, OPEN APPROACH: ICD-10-PCS | Performed by: OTOLARYNGOLOGY

## 2023-08-22 RX ORDER — NALOXONE HYDROCHLORIDE 0.4 MG/ML
80 INJECTION, SOLUTION INTRAMUSCULAR; INTRAVENOUS; SUBCUTANEOUS AS NEEDED
Status: DISCONTINUED | OUTPATIENT
Start: 2023-08-22 | End: 2023-08-22

## 2023-08-22 RX ORDER — HYDROMORPHONE HYDROCHLORIDE 1 MG/ML
0.4 INJECTION, SOLUTION INTRAMUSCULAR; INTRAVENOUS; SUBCUTANEOUS EVERY 5 MIN PRN
Status: DISCONTINUED | OUTPATIENT
Start: 2023-08-22 | End: 2023-08-22

## 2023-08-22 RX ORDER — CEFADROXIL 500 MG/1
500 CAPSULE ORAL 2 TIMES DAILY
Qty: 10 CAPSULE | Refills: 0 | Status: SHIPPED | OUTPATIENT
Start: 2023-08-22 | End: 2023-08-27

## 2023-08-22 RX ORDER — ONDANSETRON 2 MG/ML
4 INJECTION INTRAMUSCULAR; INTRAVENOUS EVERY 6 HOURS PRN
Status: DISCONTINUED | OUTPATIENT
Start: 2023-08-22 | End: 2023-08-22

## 2023-08-22 RX ORDER — SODIUM CHLORIDE, SODIUM LACTATE, POTASSIUM CHLORIDE, CALCIUM CHLORIDE 600; 310; 30; 20 MG/100ML; MG/100ML; MG/100ML; MG/100ML
INJECTION, SOLUTION INTRAVENOUS CONTINUOUS
Status: DISCONTINUED | OUTPATIENT
Start: 2023-08-22 | End: 2023-08-22

## 2023-08-22 RX ORDER — ACETAMINOPHEN 500 MG
1000 TABLET ORAL ONCE
Status: COMPLETED | OUTPATIENT
Start: 2023-08-22 | End: 2023-08-22

## 2023-08-22 RX ORDER — LIDOCAINE HYDROCHLORIDE 10 MG/ML
INJECTION, SOLUTION EPIDURAL; INFILTRATION; INTRACAUDAL; PERINEURAL AS NEEDED
Status: DISCONTINUED | OUTPATIENT
Start: 2023-08-22 | End: 2023-08-22 | Stop reason: SURG

## 2023-08-22 RX ORDER — NEOMYCIN SULFATE, POLYMYXIN B SULFATE AND HYDROCORTISONE 10; 3.5; 1 MG/ML; MG/ML; [USP'U]/ML
SUSPENSION/ DROPS AURICULAR (OTIC) AS NEEDED
Status: DISCONTINUED | OUTPATIENT
Start: 2023-08-22 | End: 2023-08-22 | Stop reason: HOSPADM

## 2023-08-22 RX ORDER — CIPROFLOXACIN AND DEXAMETHASONE 3; 1 MG/ML; MG/ML
4 SUSPENSION/ DROPS AURICULAR (OTIC) 2 TIMES DAILY
Qty: 7.5 ML | Refills: 0 | Status: SHIPPED | OUTPATIENT
Start: 2023-08-22 | End: 2023-09-12

## 2023-08-22 RX ORDER — MORPHINE SULFATE 10 MG/ML
6 INJECTION, SOLUTION INTRAMUSCULAR; INTRAVENOUS EVERY 10 MIN PRN
Status: DISCONTINUED | OUTPATIENT
Start: 2023-08-22 | End: 2023-08-22

## 2023-08-22 RX ORDER — METOCLOPRAMIDE HYDROCHLORIDE 5 MG/ML
10 INJECTION INTRAMUSCULAR; INTRAVENOUS EVERY 8 HOURS PRN
Status: DISCONTINUED | OUTPATIENT
Start: 2023-08-22 | End: 2023-08-22

## 2023-08-22 RX ORDER — MORPHINE SULFATE 4 MG/ML
2 INJECTION, SOLUTION INTRAMUSCULAR; INTRAVENOUS EVERY 10 MIN PRN
Status: DISCONTINUED | OUTPATIENT
Start: 2023-08-22 | End: 2023-08-22

## 2023-08-22 RX ORDER — ROCURONIUM BROMIDE 10 MG/ML
INJECTION, SOLUTION INTRAVENOUS AS NEEDED
Status: DISCONTINUED | OUTPATIENT
Start: 2023-08-22 | End: 2023-08-22 | Stop reason: SURG

## 2023-08-22 RX ORDER — HYDROCODONE BITARTRATE AND ACETAMINOPHEN 5; 325 MG/1; MG/1
1 TABLET ORAL EVERY 6 HOURS PRN
Qty: 5 TABLET | Refills: 0 | Status: SHIPPED | OUTPATIENT
Start: 2023-08-22

## 2023-08-22 RX ORDER — MORPHINE SULFATE 4 MG/ML
4 INJECTION, SOLUTION INTRAMUSCULAR; INTRAVENOUS EVERY 10 MIN PRN
Status: DISCONTINUED | OUTPATIENT
Start: 2023-08-22 | End: 2023-08-22

## 2023-08-22 RX ORDER — PHENYLEPHRINE HCL 10 MG/ML
VIAL (ML) INJECTION AS NEEDED
Status: DISCONTINUED | OUTPATIENT
Start: 2023-08-22 | End: 2023-08-22 | Stop reason: SURG

## 2023-08-22 RX ORDER — HYDROMORPHONE HYDROCHLORIDE 1 MG/ML
0.2 INJECTION, SOLUTION INTRAMUSCULAR; INTRAVENOUS; SUBCUTANEOUS EVERY 5 MIN PRN
Status: DISCONTINUED | OUTPATIENT
Start: 2023-08-22 | End: 2023-08-22

## 2023-08-22 RX ORDER — HYDROMORPHONE HYDROCHLORIDE 1 MG/ML
0.6 INJECTION, SOLUTION INTRAMUSCULAR; INTRAVENOUS; SUBCUTANEOUS EVERY 5 MIN PRN
Status: DISCONTINUED | OUTPATIENT
Start: 2023-08-22 | End: 2023-08-22

## 2023-08-22 RX ORDER — LIDOCAINE HYDROCHLORIDE AND EPINEPHRINE 10; 10 MG/ML; UG/ML
INJECTION, SOLUTION INFILTRATION; PERINEURAL AS NEEDED
Status: DISCONTINUED | OUTPATIENT
Start: 2023-08-22 | End: 2023-08-22 | Stop reason: HOSPADM

## 2023-08-22 RX ORDER — EPHEDRINE SULFATE 50 MG/ML
INJECTION, SOLUTION INTRAVENOUS AS NEEDED
Status: DISCONTINUED | OUTPATIENT
Start: 2023-08-22 | End: 2023-08-22 | Stop reason: SURG

## 2023-08-22 RX ORDER — FAMOTIDINE 20 MG/1
20 TABLET, FILM COATED ORAL ONCE
Status: COMPLETED | OUTPATIENT
Start: 2023-08-22 | End: 2023-08-22

## 2023-08-22 RX ORDER — METOCLOPRAMIDE 10 MG/1
10 TABLET ORAL ONCE
Status: COMPLETED | OUTPATIENT
Start: 2023-08-22 | End: 2023-08-22

## 2023-08-22 RX ORDER — CEFAZOLIN SODIUM/WATER 2 G/20 ML
SYRINGE (ML) INTRAVENOUS AS NEEDED
Status: DISCONTINUED | OUTPATIENT
Start: 2023-08-22 | End: 2023-08-22 | Stop reason: SURG

## 2023-08-22 RX ADMIN — ROCURONIUM BROMIDE 5 MG: 10 INJECTION, SOLUTION INTRAVENOUS at 07:37:00

## 2023-08-22 RX ADMIN — CEFAZOLIN SODIUM/WATER 3 G: 2 G/20 ML SYRINGE (ML) INTRAVENOUS at 07:45:00

## 2023-08-22 RX ADMIN — PHENYLEPHRINE HCL 300 MCG: 10 MG/ML VIAL (ML) INJECTION at 09:00:00

## 2023-08-22 RX ADMIN — SODIUM CHLORIDE, SODIUM LACTATE, POTASSIUM CHLORIDE, CALCIUM CHLORIDE: 600; 310; 30; 20 INJECTION, SOLUTION INTRAVENOUS at 10:29:00

## 2023-08-22 RX ADMIN — EPHEDRINE SULFATE 10 MG: 50 INJECTION, SOLUTION INTRAVENOUS at 08:20:00

## 2023-08-22 RX ADMIN — EPHEDRINE SULFATE 5 MG: 50 INJECTION, SOLUTION INTRAVENOUS at 08:30:00

## 2023-08-22 RX ADMIN — EPHEDRINE SULFATE 10 MG: 50 INJECTION, SOLUTION INTRAVENOUS at 08:09:00

## 2023-08-22 RX ADMIN — EPHEDRINE SULFATE 5 MG: 50 INJECTION, SOLUTION INTRAVENOUS at 08:42:00

## 2023-08-22 RX ADMIN — LIDOCAINE HYDROCHLORIDE 50 MG: 10 INJECTION, SOLUTION EPIDURAL; INFILTRATION; INTRACAUDAL; PERINEURAL at 07:37:00

## 2023-08-22 NOTE — OPERATIVE REPORT
Operative Report    Date: 23    Patient: Alonso Hines    MRN: [id number]    : 1955    PREOPERATIVE DIAGNOSIS: 1. Left tympanic membrane perforation, 2. Left mixed hearing loss, 3. Left eustachian tube balloon dilation    POSTOPERATIVE DIAGNOSIS: same as preoperative     PROCEDURE PERFORMED: 1. Left tympanoplasty, 2. Cartilage graft, 3. Left eustachian tube balloon dilation 4. Facial nerve monitoring     ATTENDING SURGEON: Yohan Head MD     ANESTHESIA: general endotracheal with limited use of muscle relaxant    ESTIMATED BLOOD LOSS: 5 mL    SPECIMENS REMOVED: none    IMPLANT: none     FINDINGS: 30% inferior left tympanic membrane perforation, normal ossicular chain mobility - adhesion to the incudostapedial joint divided, chorda tympani nerve and facial nerve preserved; repair performed with a cartilage and perichondrium graft in a underlay fashion    COMPLICATIONS: none    COUNTS: correct    DISPOSITION: PACU, home     INDICATIONS FOR PROCEDURE: This patient is a 60-year-old male who presented to the office for evaluation of left tympanic membrane perforation and was additionally found to have left mixed hearing loss and left eustachian tube dysfunction. This finding was further supported by CT temporal bone. Based on these findings it was recommended that the patient undergo left tympanoplasty, cartilage graft, possible ossicular chain reconstruction, facial nerve monitoring, left eustachian tube balloon dilation.  Risks benefits and alternatives were discussed with the patient including the risks of  hearing loss, tympanoplasty failure/failure of the perforation to resolve (10%), recurrence of eustachian tube dysfunction, patulous eustachian tube, damage to the nose, vertigo, tinnitus exacerbation, bleeding, infection, facial nerve injury resulting in temporary or permenant facial weakness, taste disturbance (temporary or permanent), need for revision surgery, damage to surrounding structure, anesthetic risk, and other unforseen complications  Patient freely consented to the procedure. DESCRIPTION OF PROCEDURE: The patient was placed supine on the operating room table. A verification was performed with the anesthesia service. The patient was then induced to general endotracheal anesthesia with limited use of muscle relaxant. The head of the bed was then turned 180 degrees. Patient was positioned in the usual clean fashion for endoscopic nasal surgery. A final timeout was performed. Guillermo-Synephrine soaked pledgets were placed in the left nares. Using a 0 degree endoscope the nasopharynx was visualized via the left nostil. Attention was directed to the left eustachian tube orifice. It was noted to be patent with edema over the torus. It was then re-visualized using a 30 degree endoscope. The eustachian tube dilation balloon device was tested. The device was then advanced into the nasopharynx and inserted into the left eustachian tube lumen to the appropriate length with yellow marker just visible at eustachian tube orifice without resistance. The balloon was filled with saline and pressurized up to 10 LOERNA. This apparatus was kept in position for two minutes. The balloon was deflated. The balloon was retracted and the device was removed. Minimal mucosal bleeding was noted. Next facial nerve monitoring electrodes were placed. The patient was positioned in the usual fashion for left otologic surgery. Using the operating room microscope the left ear canal was inspected and a transcanal injection of lidocaine 1% with 1-10,000 epinephrine was performed. The postauricular crease and tragus were then injected with 1% lidocaine with 1-100,000 epinephrine. The patient was then prepped and draped in the usual sterile fashion. A chevron shaped incision was made in the posterior tragus. A lateral strut of tragal cartilage remained intact and in place.  The remainder of the tragal cartilage was harvested and set aside for later use. A piece of perichondrium was placed in the fascia press. An endaural incision was made in the preauricular crease down through the skin and subcutaneous tissue to the level of the temporalis fascia superiorly. This was extended into the superior bony ear canal.  Self-retaining retractors were placed. Next using the operating room microscope the tympanic membrane was brought into view and a 30% inferior tympanic membrane perforation was visualized. The edges of the tympanic membrane perforation were freshened. Next a tympanomeatal flap was elevated. The cartilaginous annulus was identified and elevated from its groove. The chorda tympani nerve was identified and preserved. The middle ear was sharply entered. The tympanic membrane was elevated inferiorly from the 6 o'clock position to superiorly at the 12 o'clock position. The incudostapedial joint was identified and noted to be intact with an adhesion which was divided. The manubrium and the malleus umbo were noted to be intact. The ossicular chain was noted to have normal mobility on palpation with present round window reflex. The facial nerve was visualized. Then the reconstruction was performed. Using a cartilage slicer thin pieces of tragal cartilage were sliced. A piece was prepared with thinner cartilage superiorly around the malleus and thicker cartilage inferiorly. The perichondrium was removed from the fascial process. The prepared slice of tragal cartilage was used to reconstruct the tympanic membrane in an underlay fashion. Next between the cartilage and the tympanomeatal flap the perichondrium was also placed in an underlay fashion covering the cartilage. The middle ear was filled with Ciprodex soaked Gelfoam to support the cartilage and perichondrial grafts.   The tympanomeatal flap was replaced and the graft was noted to be in 100% apposition with the edges of the tympanic membrane perforation and tympanomeatal flap. No remaining defect was noted. The graft was noted to be well supported by the underlying Gelfoam.  The medial ear canal was filled with Ciprodex Gelfoam.    Closure of the incisions were performed. Using buried interrupted 4-0 Vicryl sutures the endaural incision was reapproximated and deep dermal sutures were performed bringing the skin edges back together. The skin closure was performed with Dermabond and a Steri-Strip. The tragus was closed with 4 interrupted 5-0 fast sutures. The ear canal was reinspected using an ear speculum and the operating room microscope. The grafts were noted to still be in place. The remainder of the ear canal was filled with Ciprodex Gelfoam.  A cottonball was placed at the opening of the ear canal.  This concluded the procedure. The patient was turned back over to the anesthesia service for successful awakening from general endotracheal anesthesia. The patient was taken to the recovery room in stable condition.

## 2023-08-22 NOTE — ANESTHESIA PROCEDURE NOTES
Airway  Date/Time: 8/22/2023 7:38 AM  Urgency: elective      General Information and Staff    Patient location during procedure: OR  Resident/CRNA: Ricky Calderon CRNA  Performed: CRNA   Performed by: Ricky Calderon CRNA  Authorized by: Sandrine Haynes MD      Indications and Patient Condition  Indications for airway management: anesthesia  Spontaneous ventilation: present  Sedation level: deep  Preoxygenated: yes  Patient position: sniffing  Mask difficulty assessment: 0 - not attempted    Final Airway Details  Final airway type: endotracheal airway      Successful airway: ETT  Cuffed: yes   Successful intubation technique: Video laryngoscopy  Facilitating devices/methods: intubating stylet and cricoid pressure  Blade: GlideScope  Blade size: #3  ETT size (mm): 7.5    Cormack-Lehane Classification: grade IIA - partial view of glottis  Measured from: teeth  ETT to teeth (cm): 23  Number of attempts at approach: 1

## 2023-08-22 NOTE — DISCHARGE INSTRUCTIONS
POST-OPERATIVE INSTRUCTION FOR EAR SURGERY    You need to have a follow-up appointment one week from the date of your surgery. Please confirm and keep this appointment  You will find a cotton ball in the ear which may be removed. If you have been instructed to use ear drops, you may place the ear drops directly onto the packing in the ear canal, lie on your side and allow the drops to soak into the packing and then replace the cotton ball. Call my office if discharge from the ear develops, or the ear develops an odor. Do not blow your nose for two weeks following surgery. Any accumulated secretion in the nose should be expectorated through the mouth to avoid infecting the ear. If you sneeze, do so with your mouth open. A full sensation with popping sounds is commonly noticed during the healing period. This is normal.  Water should be kept out of the ear until it is healed. The hair may be shampooed following surgery providing water is not allowed to enter the ear canal.  This is avoided by putting a plug of cotton into the canal and applying Vaseline over the cotton. Your doctors will let you know when water can enter your ear again safely. If you had surgery to improve your hearing, do not be concerned regarding your hearing for a period of eight weeks following surgery. Your hearing will be evaluated at that time. If vertigo or dizziness which causes nausea develops, you should contact my office. If you have had a repair of an eardrum, you have packing in your ear, which will need to be removed. All of your packing will be removed three weeks after surgery. If you have had a repair of an eardrum, you should use your antibiotic eardrops (ciprofloxacin-dexamethasone) until all of the packing has been removed. Use 4 drops into the operated ear 2 times a day. Continue the drops until instructed to stop them at one of your postoperative visits.   Smoking is very detrimental to the healing process after ear surgery. Even secondhand smoke can impair the healing of the ear and increases the risk of ear infections. You should refrain from smoking as much as possible while the majority of the healing takes place. Please be aware that ear canal incisions, deep inside the ear may ooze after surgery and saturate the cotton ball with blood. This is not a cause for worry. The cotton ball may be replaced. Changing the cotton ball 2-4 times a day for several days is necessary in some cases. Activity Restrictions: Vigorous physical activity such as running, jumping and especially head shaking and head bumping should be avoided for two weeks after surgery. No heavy lifting or straining. Do not use a CPAP unless cleared by your physician. Swimming should also be avoided after surgery until you have been given permission to do so on one of your postoperative visits. Flying is prohibited for 4 weeks after surgery. Complete any course of oral antibiotics given to you. Pain control:   Discomfort after ear surgery can require taking pain medication for 3 to 4 days. If indicated, your physician will prescribe narcotic pain medication to be used as needed for severe pain. Most of these pain medications contain acetaminophen so do not take them with additional acetaminophen. No driving if taking narcotic pain medication. Please use acetaminophen or ibuprofen as needed for mild to moderate pain. It is helpful to alternate the medications so that you are taking pain medication every 3 hours. Recommended dosing:  Acetaminophen 500-650mg every 6 hours, as needed for pain. Ibuprofen 600mg every 6 hours, as needed for pain. If you have any questions regarding your comfort or care, please contact my office at [phone] or send a message via [phone] I have a 24-hour answering service that will assist you after hours.

## 2023-08-22 NOTE — INTERVAL H&P NOTE
Pre-op Diagnosis: Mixed conductive and sensorineural hearing loss of left ear with restricted hearing of right ear [H90. A32]  Tympanic membrane perforation, left [H72.92]  Disorder of left eustachian tube [H69.92]    The above referenced H&P was reviewed by Lucila Morris MD on 8/22/2023, the patient was examined and no significant changes have occurred in the patient's condition since the H&P was performed. I discussed with the patient and/or legal representative the potential benefits, risks and side effects of this procedure; the likelihood of the patient achieving goals; and potential problems that might occur during recuperation. I discussed reasonable alternatives to the procedure, including risks, benefits and side effects related to the alternatives and risks related to not receiving this procedure. We will proceed with procedure as planned.

## 2023-08-23 ENCOUNTER — ANESTHESIA (OUTPATIENT)
Dept: SURGERY | Facility: HOSPITAL | Age: 68
End: 2023-08-23
Payer: COMMERCIAL

## 2023-08-23 ENCOUNTER — TELEPHONE (OUTPATIENT)
Dept: OTOLARYNGOLOGY | Facility: CLINIC | Age: 68
End: 2023-08-23

## 2023-08-23 NOTE — TELEPHONE ENCOUNTER
Called and spoke with patient. Patient is post op Day 1 Left ear tympanoplasty, cartilage graft, left eustachian tube balloon dilation with ssep. Per patient, he is doing well. Reports to have had some slight bleeding yesterday that has stopped. Reports no green, yellow drainage. Patient instructions provided:   POST-OPERATIVE INSTRUCTION FOR EAR SURGERY     You need to have a follow-up appointment one week from the date of your surgery. Please confirm and keep this appointment  You will find a cotton ball in the ear which may be removed. If you have been instructed to use ear drops, you may place the ear drops directly onto the packing in the ear canal, lie on your side and allow the drops to soak into the packing and then replace the cotton ball. Call my office if discharge from the ear develops, or the ear develops an odor. Do not blow your nose for two weeks following surgery. Any accumulated secretion in the nose should be expectorated through the mouth to avoid infecting the ear. If you sneeze, do so with your mouth open. A full sensation with popping sounds is commonly noticed during the healing period. This is normal.  Water should be kept out of the ear until it is healed. The hair may be shampooed following surgery providing water is not allowed to enter the ear canal.  This is avoided by putting a plug of cotton into the canal and applying Vaseline over the cotton. Your doctors will let you know when water can enter your ear again safely. If you had surgery to improve your hearing, do not be concerned regarding your hearing for a period of eight weeks following surgery. Your hearing will be evaluated at that time. If vertigo or dizziness which causes nausea develops, you should contact my office. If you have had a repair of an eardrum, you have packing in your ear, which will need to be removed. All of your packing will be removed three weeks after surgery.   If you have had a repair of an eardrum, you should use your antibiotic eardrops (ciprofloxacin-dexamethasone) until all of the packing has been removed. Use 4 drops into the operated ear 2 times a day. Continue the drops until instructed to stop them at one of your postoperative visits. Smoking is very detrimental to the healing process after ear surgery. Even secondhand smoke can impair the healing of the ear and increases the risk of ear infections. You should refrain from smoking as much as possible while the majority of the healing takes place. Please be aware that ear canal incisions, deep inside the ear may ooze after surgery and saturate the cotton ball with blood. This is not a cause for worry. The cotton ball may be replaced. Changing the cotton ball 2-4 times a day for several days is necessary in some cases. Activity Restrictions: Vigorous physical activity such as running, jumping and especially head shaking and head bumping should be avoided for two weeks after surgery. No heavy lifting or straining. Do not use a CPAP unless cleared by your physician. Swimming should also be avoided after surgery until you have been given permission to do so on one of your postoperative visits. Flying is prohibited for 4 weeks after surgery. Complete any course of oral antibiotics given to you. Pain control:   Discomfort after ear surgery can require taking pain medication for 3 to 4 days. Patient understanding of all information received and confirms to have post op instructions copy as well.        Future Appointments   Date Time Provider Omer Ricardo   8/30/2023  2:00 PM Napoleon Martinez MD [address] [facility]   9/18/2023  9:00 AM ELLEN Vasquez [facility]

## 2023-08-30 ENCOUNTER — OFFICE VISIT (OUTPATIENT)
Dept: OTOLARYNGOLOGY | Facility: CLINIC | Age: 68
End: 2023-08-30

## 2023-08-30 VITALS — WEIGHT: 298 LBS | HEIGHT: 73 IN | BODY MASS INDEX: 39.49 KG/M2

## 2023-08-30 DIAGNOSIS — H90.A32 MIXED CONDUCTIVE AND SENSORINEURAL HEARING LOSS OF LEFT EAR WITH RESTRICTED HEARING OF RIGHT EAR: ICD-10-CM

## 2023-08-30 DIAGNOSIS — H72.92 PERFORATION OF LEFT TYMPANIC MEMBRANE: ICD-10-CM

## 2023-08-30 DIAGNOSIS — H69.92 ETD (EUSTACHIAN TUBE DYSFUNCTION), LEFT: Primary | ICD-10-CM

## 2023-08-30 PROCEDURE — 3008F BODY MASS INDEX DOCD: CPT | Performed by: OTOLARYNGOLOGY

## 2023-08-30 NOTE — PROGRESS NOTES
RETURNING PATIENT PROGRESS NOTE  OTOLOGY/OTOLARYNGOLOGY    REF MD:  Trever Cisneros  [address],  [address]     PCP: Cata Hernandez DO    CHIEF COMPLAINT:  Patient presents with:  Post-Op: Patient is here for post op of ear surgery. LAST OFFICE VISIT 7/26/23  IMPRESSION:  Left tympanic membrane perforation  Left eustachian tube dysfunction  Left mixed hearing loss  Right SNHL  Bilateral tinnitus, subjective, non-pulsatile     PLAN:  -Audiogram reviewed with patient   -Recommend CT temporal bone w/o contrast prior to surgery in light of eustachian tube dysfunction and history of ear infections  -Recommend left tympanoplasty (likely transcanal, possible endaural), possible cartilage graft, possible ossicular chain reconstruction AND left eustachian tube balloon dilation   -Patient will still need bilateral hearing aids after surgery  -Discussed the details of surgery and postoperative care. Discussed that this is typically an outpatient surgery in healthy patients. Surgery typically will take 1-2 hours. Hearing will not be re-evaluated until 8-10 weeks postoperatively. We discussed the postoperative appt schedule and restrictions.  -Risks of surgery are noted to include, but are not limited to hearing loss including tympanoplasty failure/failure of the perforation to resolve (10%), recurrence of eustachian tube dysfunction, patulous eustachian tube, damage to the nose, hearing loss, vertigo, tinnitus exacerbation, bleeding, infection, facial nerve injury resulting in temporary or permenant facial weakness, taste disturbance (temporary or permanent), need for revision surgery, damage to surrounding structure, anesthetic risk, and other unforseen complications  -Patient will await hearing from surgical scheduling to select a surgery date.  He needs medical and cardiac clearance prior to surgery.  -Patient also with nasal breathing issues - may benefit from septoplasty and functional rhinoplasty vs isolated left internal nasal valve treatment. Will further consider this after ear surgery. ________________________________________________________________    INTERVAL HX: S/p left tympanoplasty and Eustachian tube balloon dilation on 8/22/23. Finished antibiotics. Continuing dry ear precautions and otic drops. Denies dysgeusia. Denies nasal issues. HISTORY OF PRESENT ILLNESS: Lenny Fields is a 76year old male who presents for evaluation of tympanic membrane perforation. The patient was seen by Dr. Valerie Man about 3-4 months ago and had persistent effusion in the left ear after a URI. This had happened to the patient in the past and would take a very long time to clear. It was therefore recommended he undergo myringotomy. The patient underwent this procedure with improvement to pressure/fluid, but he noted his hearing worsened and has not improved. Patient has bilateral subjective non-pulsatile tinntius. Denies vertigo, dizziness, otorrhea, otalgia or previous otologic procedure. 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 He has a history of many ear infections when he was younger. PAST MEDICAL HISTORY:    Past Medical History:   Diagnosis Date    Arrhythmia     Svt    BCC (basal cell carcinoma) 2023    left forearm proximal    BCC (basal cell carcinoma) 2023    left forearm distal    High blood pressure     Other and unspecified hyperlipidemia     Sleep apnea     not using machine    SVT (supraventricular tachycardia) (HCC)     Unspecified essential hypertension        PAST SURGICAL HISTORY:    Past Surgical History:   Procedure Laterality Date    OTHER SURGICAL HISTORY  02/17    heart ablation        ciprofloxacin-dexamethasone 0.3-0.1 % Otic Suspension, Place 4 drops into the left ear 2 (two) times daily for 21 days. , Disp: 7.5 mL, Rfl: 0  HYDROcodone-acetaminophen 5-325 MG Oral Tab, Take 1 tablet by mouth every 6 (six) hours as needed for Pain (severe pain). , Disp: 5 tablet, Rfl: 0  telmisartan 80 MG Oral Tab, Take 1 tablet (80 mg total) by mouth daily. , Disp: 90 tablet, Rfl: 3    No current facility-administered medications on file prior to visit. Allergies:   Lisinopril              ANAPHYLAXIS  Pravastatin             PAIN    Comment:Severe joint pain. SOCIAL HISTORY:  Social History    Tobacco Use      Smoking status: Former        Types: Cigarettes      Smokeless tobacco: Never    Alcohol use: Yes      Alcohol/week: 0.0 standard drinks of alcohol      Comment: Occasionally      FAMILY HISTORY: Denies known family history of hearing loss, tinnitus, vertigo, or migraine. Denies known family history of head and neck cancer, thyroid cancer, bleeding disorders. REVIEW OF SYSTEMS:   Positives are in bold  Neuro: Headache, facial weakness, facial numbness, neck pain, vertigo  ENT: Hearing change, tinnitus, otorrhea, otalgia, aural fullness, ear pressure, vertigo, imbalance  Sinus pressure, rhinorrhea, congestion, facial pain, jaw pain, dysphagia, odynophagia, sore throat, voice changes, shortness of breath    EXAMINATION:  I washed my hands with an alcohol-based hand gel prior to examination  Constitutional:   --Vitals: Height 6' 1\" (1.854 m), weight 298 lb. --General: no apparent distress, well-developed, conversant  Psych: affect pleasant and appropriate for age, alert and oriented  Neuro: Facial movement normal bilateral  Eyes: Pupils equal, symmetric and reactive to light. Extra-ocular muscles intact  Respiratory: No stridor, stertor or increased work of breathing  ENT:  --Nose: nasal tip pointed to the left, no external nasal valve collapse, bilateral anterior septal deviation, it is not caudal, right worse than left, mucosa healthy, no inferior turbinate hypertrophy  --Ear: The ears were examined under binocular microscopy  Left ear microscopic exam:  Pinna: Tragal incision is well-healed without hematoma. Mastoid: Nontender on palpation.    External auditory canal: Filled with gel foam packing. No infection. Nasopharyngoscopy 07/26/23:  Informed consent obtained, patient was correctly identified  Topical anesthesia with aerosolized lidocaine and ephedrine spray in the bilateral nares  Findings: The flexible scope was advanced into the bilateral nares via the inferior meatus into the nasopharynx. Bilateral anterior septal deviation, but not in the caudal septum. More severe on the right (touching the inferior turbinate) than on the left. Beyond the septal deviation which is localized the left inferior nasal passage is clear. The bilateral eustachian tubes are patent. No masses or lesions in the bilateral nasopharynx or fossae of Rosenmueller. Complications none, procedure well tolerated    Audiogram (date 7-) interpreted and reviewed with the patient today showing:  Puretones: Right: normal to moderate to mild to moderate-severe SNHL Left: moderate to mild to severe mixed hearing loss   Tymps: Right: A Left: B  SRT: Right: 40 Left: 60  Prior exam SD: 100% bilaterally    Latest Audiogram Result (Hz) Exam performed: 7/26/2023 11:22 AM Last edited by Magdalena Martinez on 7/26/2023 11:29 AM        125 250  1500 2000 3000 4000 6000 8000    Right air:  20 50 50 50 30 25 40 40 65 55    Left air:  65 80 75 55 55 70 65 70 75 85    Left mastoid bone (masked):   60 60 40 35 70 45 50         Reliability:  Good    Transducer:   Inserts    Technique:  Conventional Audiometry    Comments:            Latest Speech Audiometry  Last edited by Magdalena Martinez on 7/26/2023 11:26 AM       Ear Method SAT SRT MCL UCL Notes    right live voice  40       left live voice  60        Ear Method Test/List Score (%) Intensity Mask/Noise   right live voice       left live voice                         Latest Tympanogram Result       Probe Tone (Hz): Unknown Exam performed: 7/26/2023 11:22 AM Last edited by Magdalena Martinez on 7/26/2023 11:29 AM Tympanograms  These were drawn by a user, not generated from device data      Right Ear Left Ear                     Right Ear Left Ear    Tympanogram type:      Canal volume (mL): 1.8 3.7    Peak pressure (daPa): -13     Peak amplitude (mL): 0.23     Tympanogram width (daPa): Comments:                    Latest Audiogram and Tympanogram Result Text  Last edited by Magdalena Roper on 7/26/2023 11:29 AM      Study Result                 Narrative & Impression  Follow up audiogram shows decrease in left ear air conduction especially in lower frequencies, compared to last audiogram on 5-12-23. Left tympanogram is flat with large ear canal volume, similar to results from 5-12-23. Follow up with Dr. Jonatan Borges. ASSESSMENT/PLAN:  Beatris Clemens is a 76year old male with   No diagnosis found. IMPRESSION:  Left tympanic membrane perforation  Left eustachian tube dysfunction  Left mixed hearing loss  S/p left tympanoplasty and left Eustachian balloon dilation on 8/22/23  Right SNHL  Bilateral tinnitus, subjective, non-pulsatile     PLAN:  -Continue Ciprodex drops, 4 drops once daily until follow-up  -Continue to limit heavy lifting and straining  -No flying for 4 weeks after surgery  -Patient also with nasal breathing issues - may benefit from septoplasty and functional rhinoplasty vs isolated left internal nasal valve treatment. Will further consider this after ear surgery.  -Follow-up in 2 weeks for packing removal     Situation reviewed with the patient in detail. Attention: This note has been scribed by Aviva Moon under the supervision of Yohan Head MD.      Yohan Head MD  Otology/Otolaryngology  [facility]   [address] [address] [address]  Phone [phone]  Fax [phone]     I have personally performed the services described in this documentation.  All medical record entries made by the scribe were at my direction and in my presence. I have reviewed the chart and agree that the medical record reflects my personal performance and is accurate and complete.

## 2023-09-13 ENCOUNTER — OFFICE VISIT (OUTPATIENT)
Dept: OTOLARYNGOLOGY | Facility: CLINIC | Age: 68
End: 2023-09-13

## 2023-09-13 VITALS — HEIGHT: 73 IN | WEIGHT: 298 LBS | BODY MASS INDEX: 39.49 KG/M2

## 2023-09-13 DIAGNOSIS — H90.A32 MIXED CONDUCTIVE AND SENSORINEURAL HEARING LOSS OF LEFT EAR WITH RESTRICTED HEARING OF RIGHT EAR: Primary | ICD-10-CM

## 2023-09-13 DIAGNOSIS — H72.92 PERFORATION OF LEFT TYMPANIC MEMBRANE: ICD-10-CM

## 2023-09-13 DIAGNOSIS — H69.92 DYSFUNCTION OF LEFT EUSTACHIAN TUBE: ICD-10-CM

## 2023-09-13 PROCEDURE — 99024 POSTOP FOLLOW-UP VISIT: CPT | Performed by: OTOLARYNGOLOGY

## 2023-09-13 PROCEDURE — 3008F BODY MASS INDEX DOCD: CPT | Performed by: OTOLARYNGOLOGY

## 2023-09-19 NOTE — PROGRESS NOTES
Procedure code [address] needed via [facility] web portal   All clinicals submitted  Case is pending review  Once approval has been rcv'd writer will reach out to pt and schedule procedure      pending auth # [id number]
Other

## 2023-09-28 ENCOUNTER — TELEPHONE (OUTPATIENT)
Dept: AUDIOLOGY | Facility: CLINIC | Age: 68
End: 2023-09-28

## 2023-09-28 NOTE — TELEPHONE ENCOUNTER
Spoke to patient   He wants to see KP sooner than November. I advised I will have  call him once she gets back to see if there are any openings in her schedule  He also said aids are not able to connect to Bluetooth etc. I sent [employer] customer support number for help with connectivity issues.

## 2023-10-02 NOTE — TELEPHONE ENCOUNTER
Spoke with patient. Follow up appointment for hearing aid will be scheduled for October 16th at 11 am.  Trial period to be extended by 2 weeks. Pt still having connectivity issues even after talking with [facility] customer support.      Veronica Mercedes

## 2023-10-09 ENCOUNTER — MED REC SCAN ONLY (OUTPATIENT)
Dept: FAMILY MEDICINE CLINIC | Facility: CLINIC | Age: 68
End: 2023-10-09

## 2023-10-16 ENCOUNTER — OFFICE VISIT (OUTPATIENT)
Dept: AUDIOLOGY | Facility: CLINIC | Age: 68
End: 2023-10-16

## 2023-10-16 DIAGNOSIS — H90.6 MIXED CONDUCTIVE AND SENSORINEURAL HEARING LOSS OF BOTH EARS: Primary | ICD-10-CM

## 2023-10-16 PROCEDURE — 92593 HEARING AID CHECK, BOTH EARS: CPT | Performed by: AUDIOLOGIST

## 2023-10-16 NOTE — PROGRESS NOTES
HEARING AID FOLLOW-UP    Stuart Hudson  1955  [id number]    Patient is here for a routine. Trial period is . Hearing Aid Information        Right     Left    Yahaira Aguilera [facility] L90-R    [id number]  [id number]    Rhae Areas    Moderate #2 speaker Moderate #2 speaker    C-Shell [id number] C-Shell [id number]    [facility] [id number] [facility] [id number]    Sarah Aaron        The patient has the following concerns:  Has been having issues with Bluetooth   Trouble hearing in noise and meetings  Cannot connect both aids to the [facility] [place] [facility]    In office the following actions were taken:  Updated firmware in hearing aid for bug fixes and improved Bluetooth stability. Added a Speech In Noise program.  Paired hearing aids to Daniele and discussed various programs and volume control usage. Patient is to follow up in 1 year or sooner if needed. Reminder card filled out.      10/16/2023  ELLEN Davis

## 2023-10-23 ENCOUNTER — OFFICE VISIT (OUTPATIENT)
Dept: AUDIOLOGY | Facility: CLINIC | Age: 68
End: 2023-10-23

## 2023-10-23 ENCOUNTER — OFFICE VISIT (OUTPATIENT)
Dept: OTOLARYNGOLOGY | Facility: CLINIC | Age: 68
End: 2023-10-23

## 2023-10-23 ENCOUNTER — AUDIOLOGY DOCUMENTATION (OUTPATIENT)
Dept: AUDIOLOGY | Facility: CLINIC | Age: 68
End: 2023-10-23

## 2023-10-23 VITALS — BODY MASS INDEX: 39.49 KG/M2 | WEIGHT: 298 LBS | HEIGHT: 73 IN

## 2023-10-23 DIAGNOSIS — H90.6 MIXED CONDUCTIVE AND SENSORINEURAL HEARING LOSS, BILATERAL: Primary | ICD-10-CM

## 2023-10-23 DIAGNOSIS — H90.6 MIXED HEARING LOSS, BILATERAL: ICD-10-CM

## 2023-10-23 DIAGNOSIS — H90.A32 MIXED CONDUCTIVE AND SENSORINEURAL HEARING LOSS OF LEFT EAR WITH RESTRICTED HEARING OF RIGHT EAR: Primary | ICD-10-CM

## 2023-10-23 DIAGNOSIS — H90.A21 SENSORINEURAL HEARING LOSS (SNHL) OF RIGHT EAR WITH RESTRICTED HEARING OF LEFT EAR: ICD-10-CM

## 2023-10-23 DIAGNOSIS — H69.92 DYSFUNCTION OF LEFT EUSTACHIAN TUBE: ICD-10-CM

## 2023-10-23 DIAGNOSIS — H93.13 BILATERAL TINNITUS: ICD-10-CM

## 2023-10-23 DIAGNOSIS — H72.92 PERFORATION OF LEFT TYMPANIC MEMBRANE: ICD-10-CM

## 2023-10-23 PROCEDURE — 99024 POSTOP FOLLOW-UP VISIT: CPT | Performed by: OTOLARYNGOLOGY

## 2023-10-23 PROCEDURE — 3008F BODY MASS INDEX DOCD: CPT | Performed by: OTOLARYNGOLOGY

## 2023-10-23 NOTE — PROGRESS NOTES
RETURNING PATIENT PROGRESS NOTE  OTOLOGY/OTOLARYNGOLOGY    REF MD:  Caren Hancock, [address],  [address]     PCP: Neva Myers. DO Mary    CHIEF COMPLAINT:  Patient presents with:  Post-Op: Patient here for post-op visit- left tympanoplasty,cartilage graft, left eustachian tube balloon dilation facial nerve monitoring     LAST OFFICE VISIT 9/13/23  IMPRESSION:  Left tympanic membrane perforation  Left eustachian tube dysfunction  Left mixed hearing loss  S/p left tympanoplasty and left Eustachian balloon dilation on 8/22/23  Right SNHL  Bilateral tinnitus, subjective, non-pulsatile     PLAN:  -OK to fly after 9/22/23  -Stop Ciprodex drops   -No activity restrictions  -Patient also with nasal breathing issues - may benefit from septoplasty and functional rhinoplasty vs isolated left internal nasal valve treatment. Will further consider this after ear surgery.  -Follow-up 8 weeks after date of surgery for repeat audiogram  ________________________________________________________________    INTERVAL HX: Here for repeat audiogram.    HISTORY OF PRESENT ILLNESS: Chad Fortune is a 76year old male who presents for evaluation of tympanic membrane perforation. The patient was seen by Dr. Harley Wiggins about 3-4 months ago and had persistent effusion in the left ear after a URI. This had happened to the patient in the past and would take a very long time to clear. It was therefore recommended he undergo myringotomy. The patient underwent this procedure with improvement to pressure/fluid, but he noted his hearing worsened and has not improved. Patient has bilateral subjective non-pulsatile tinntius. Denies vertigo, dizziness, otorrhea, otalgia or previous otologic procedure. 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 He has a history of many ear infections when he was younger.     PAST MEDICAL HISTORY:    Past Medical History:   Diagnosis Date    Arrhythmia     Svt    BCC (basal cell carcinoma) 2023    left forearm proximal    BCC (basal cell carcinoma) 2023    left forearm distal    High blood pressure     Other and unspecified hyperlipidemia     Sleep apnea     not using machine    SVT (supraventricular tachycardia)     Unspecified essential hypertension        PAST SURGICAL HISTORY:    Past Surgical History:   Procedure Laterality Date    OTHER SURGICAL HISTORY  02/2017    heart ablation     OTHER SURGICAL HISTORY  08/22/2023    1. Left tympanoplasty, 2. Cartilage graft, 3. Left eustachian tube balloon dilation 4. Facial nerve monitoring       telmisartan 80 MG Oral Tab, Take 1 tablet (80 mg total) by mouth daily. , Disp: 90 tablet, Rfl: 3  mupirocin 2 % External Ointment, MIX WITH VASELINE AND APPLY TO SURGICAL WOUND TWICE DAILY, Disp: , Rfl:   HYDROcodone-acetaminophen 5-325 MG Oral Tab, Take 1 tablet by mouth every 6 (six) hours as needed for Pain (severe pain). , Disp: 5 tablet, Rfl: 0    No current facility-administered medications on file prior to visit. Allergies:   Lisinopril              ANAPHYLAXIS  Pravastatin             PAIN    Comment:Severe joint pain. SOCIAL HISTORY:  Social History    Tobacco Use      Smoking status: Former        Types: Cigarettes      Smokeless tobacco: Never    Alcohol use: Yes      Alcohol/week: 0.0 standard drinks of alcohol      Comment: Occasionally      FAMILY HISTORY: Denies known family history of hearing loss, tinnitus, vertigo, or migraine. Denies known family history of head and neck cancer, thyroid cancer, bleeding disorders.      REVIEW OF SYSTEMS:   Positives are in bold  Neuro: Headache, facial weakness, facial numbness, neck pain, vertigo  ENT: Hearing change, tinnitus, otorrhea, otalgia, aural fullness, ear pressure, vertigo, imbalance  Sinus pressure, rhinorrhea, congestion, facial pain, jaw pain, dysphagia, odynophagia, sore throat, voice changes, shortness of breath    EXAMINATION:  I washed my hands with an alcohol-based hand gel prior to examination  Constitutional:   --Vitals: Height 6' 1\" (1.854 m), weight 298 lb. --General: no apparent distress, well-developed, conversant  Psych: affect pleasant and appropriate for age, alert and oriented  Neuro: Facial movement normal bilateral  Eyes: Pupils equal, symmetric and reactive to light. Extra-ocular muscles intact  Respiratory: No stridor, stertor or increased work of breathing  ENT:  --Nose: nasal tip pointed to the left, no external nasal valve collapse, bilateral anterior septal deviation, it is not caudal, right worse than left, mucosa healthy, no inferior turbinate hypertrophy  --Ear: The ears were examined under binocular microscopy  Right ear microscopic exam:  Pinna: Normal, no lesions or masses. Mastoid: Nontender on palpation. External auditory canal: Clear, no masses or lesions. Tympanic membrane: Intact, no lesions, normal landmarks. Middle ear: Aerated. Left ear microscopic exam:  Pinna: Tragal incision is well-healed without hematoma. Mastoid: Nontender on palpation. External auditory canal: Clear, no masses or lesions  Tympanic membrane: Intact with good integration of tragal and perichondrium graft. No residual perforation. Middle ear: Aerated    Nasopharyngoscopy 07/26/23:  Informed consent obtained, patient was correctly identified  Topical anesthesia with aerosolized lidocaine and ephedrine spray in the bilateral nares  Findings: The flexible scope was advanced into the bilateral nares via the inferior meatus into the nasopharynx. Bilateral anterior septal deviation, but not in the caudal septum. More severe on the right (touching the inferior turbinate) than on the left. Beyond the septal deviation which is localized the left inferior nasal passage is clear. The bilateral eustachian tubes are patent. No masses or lesions in the bilateral nasopharynx or fossae of Rosenmueller.   Complications none, procedure well tolerated    Latest Audiogram Result (Hz) Exam performed: 10/23/2023 10:20 AM Last edited by Maikel Gibson MS, CCC-A on 10/23/2023 10:40 AM        125 250  1500 2000 3000 4000 6000 8000    Right air:  20 55  50 30 25  35 65 55    Left air:  30 50  45 50 70  75 80 80    Right mastoid bone:      30 25  35      Left mastoid bone:   45  25          Right mastoid bone (masked):   50  35          Left mastoid bone (masked):      30 70  55         Reliability:  Good    Transducer: Inserts    Technique:  Conventional Audiometry    Comments:            Latest Speech Audiometry  Last edited by Maikel Gibson MS, CCC-A on 10/23/2023 10:29 AM       Ear Method PTA SAT SRT [facility] Test/list Score (%) Intensity Mask/noise Notes    right    35    100 75      left    40    96 80                    Latest Tympanogram Result       Probe Tone (Hz): 226 Exam performed: 10/23/2023 10:20 AM Last edited by Maikel Gibson MS, CCC-A on 10/23/2023 10:40 AM      Tympanograms  These were drawn by a user, not generated from device data      Right Ear Left Ear                     Right Ear Left Ear    Tympanogram type: Type A Type B    Canal volume (mL): 2.4 1.9    Peak pressure (daPa): -40     Peak amplitude (mL): 0.35     Tympanogram width (daPa): Comments:                    Latest Audiogram and Tympanogram Result Text  Last edited by Maikel Gibson MS, CCC-A on 10/23/2023 10:40 AM      Study Result                 Narrative & Impression  Mixed hearing loss. Last audiogram 7/26/2023. S/p left tympanoplasty and skin graft. Left ear improved in low frequencies. F/u with Dr. Deborah Gunter today.       ASSESSMENT/PLAN:  Grayson Walton is a 76year old male with   (H90.A32) Mixed conductive and sensorineural hearing loss of left ear with restricted hearing of right ear  (primary encounter diagnosis)  (H90.A21) Sensorineural hearing loss (SNHL) of right ear with restricted hearing of left ear  (H93.13) Bilateral tinnitus  (H72.92) Perforation of left tympanic membrane  (H69.92) Dysfunction of left eustachian tube     IMPRESSION:  Left tympanic membrane perforation - resolved  Left eustachian tube dysfunction  Left mixed hearing loss, significant low frequency conductive hearing loss improvement after surgery   S/p left tympanoplasty and left Eustachian balloon dilation on 8/22/23  Right SNHL  Bilateral tinnitus, subjective, non-pulsatile     PLAN:  -Audiogram reviewed with patient - hearing improved  -No activity restrictions  -OK to have hearing aids adjusted   -Patient also with nasal breathing issues - may benefit from septoplasty and functional rhinoplasty vs isolated left internal nasal valve treatment. Will further consider this after ear surgery.  -Follow-up in 6 months for Eustachian tube dysfunction symptoms in left ear     Situation reviewed with the patient in detail. Attention: This note has been scribed by José Emerson under the supervision of Yvette Mancuso MD.      Yvette Mancuso MD  Otology/Otolaryngology  [place] [id number] [place] [address]  Phone [phone]  Fax [phone]     I have personally performed the services described in this documentation. All medical record entries made by the scribe were at my direction and in my presence. I have reviewed the chart and agree that the medical record reflects my personal performance and is accurate and complete.

## 2023-10-23 NOTE — PROGRESS NOTES
Hearing Aid Information        Right     Left    Ermaak Arvni Delacruz [facility] L90-R    [id number]  [id number]    Leonard Ann    Moderate #2 speaker Moderate #2 speaker    C-Shell [id number] C-Shell [id number]    [facility] [id number] [address]       Patient was seen for hearing aid adjustments following his post op visit with Dr. Anjum Osborn. Hearing in his left ear has improved significantly at 250-1000 Hz. Adjustments to audiogram made in the software and left hearing aid was reprogrammed to new audiogram.     Recommend:   Return to clinic as needed for hearing aid repairs/adjustments.        Veronica Miller

## 2023-11-03 ENCOUNTER — OFFICE VISIT (OUTPATIENT)
Dept: AUDIOLOGY | Facility: CLINIC | Age: 68
End: 2023-11-03
Payer: COMMERCIAL

## 2023-11-03 DIAGNOSIS — H90.6 MIXED CONDUCTIVE AND SENSORINEURAL HEARING LOSS, BILATERAL: Primary | ICD-10-CM

## 2023-11-03 PROCEDURE — 92593 HEARING AID CHECK, BOTH EARS: CPT | Performed by: AUDIOLOGIST

## 2023-11-03 NOTE — PROGRESS NOTES
HEARING AID FOLLOW-UP    Rodney Wilde  6/9/1955  [id number]    Patient is here for a routine. Hearing Aid Information        Right     Left    Phonak Sidney Osborn [facility] L90-R    [id number]  [id number]    Sundra Danger    Moderate #2 speaker Moderate #2 speaker    C-Shell [id number] C-Shell [id number]    [facility] [id number] [facility] [id number]    Yesika Ruiz        The patient has the following concerns:  Patient reports left hearing aid is too loud since changes were made to reflect his improved hearing following surgery. In office the following actions were taken:  Aids were connected to software and real ear measurements taken. Left aid was found to be overshooting targets for soft and average speech from [id number]. Aid does not exceed MPO  Gain was reduced in software until speech mapping showed match to targets. Pt reported much improved comfort.       Patient is to follow up in as needed    11/3/2023  Zoie Hickman, AUD

## 2024-01-09 ENCOUNTER — OFFICE VISIT (OUTPATIENT)
Dept: FAMILY MEDICINE CLINIC | Facility: CLINIC | Age: 69
End: 2024-01-09
Payer: COMMERCIAL

## 2024-01-09 VITALS
HEIGHT: 73 IN | SYSTOLIC BLOOD PRESSURE: 118 MMHG | DIASTOLIC BLOOD PRESSURE: 75 MMHG | HEART RATE: 80 BPM | WEIGHT: 303 LBS | BODY MASS INDEX: 40.16 KG/M2

## 2024-01-09 DIAGNOSIS — I10 ESSENTIAL HYPERTENSION WITH GOAL BLOOD PRESSURE LESS THAN 130/80: ICD-10-CM

## 2024-01-09 DIAGNOSIS — Z86.79 HISTORY OF PSVT (PAROXYSMAL SUPRAVENTRICULAR TACHYCARDIA): ICD-10-CM

## 2024-01-09 DIAGNOSIS — Z00.00 ROUTINE PHYSICAL EXAMINATION: Primary | ICD-10-CM

## 2024-01-09 DIAGNOSIS — K21.9 CHRONIC GERD: ICD-10-CM

## 2024-01-09 DIAGNOSIS — E78.5 HYPERLIPIDEMIA, UNSPECIFIED HYPERLIPIDEMIA TYPE: ICD-10-CM

## 2024-01-09 DIAGNOSIS — J43.8 OTHER EMPHYSEMA (HCC): ICD-10-CM

## 2024-01-09 DIAGNOSIS — Z12.11 ENCOUNTER FOR SCREENING COLONOSCOPY: ICD-10-CM

## 2024-01-09 DIAGNOSIS — G47.33 OSA (OBSTRUCTIVE SLEEP APNEA): ICD-10-CM

## 2024-01-09 PROCEDURE — 99397 PER PM REEVAL EST PAT 65+ YR: CPT | Performed by: FAMILY MEDICINE

## 2024-01-09 PROCEDURE — 3074F SYST BP LT 130 MM HG: CPT | Performed by: FAMILY MEDICINE

## 2024-01-09 PROCEDURE — 3008F BODY MASS INDEX DOCD: CPT | Performed by: FAMILY MEDICINE

## 2024-01-09 PROCEDURE — 99213 OFFICE O/P EST LOW 20 MIN: CPT | Performed by: FAMILY MEDICINE

## 2024-01-09 PROCEDURE — 3078F DIAST BP <80 MM HG: CPT | Performed by: FAMILY MEDICINE

## 2024-01-09 RX ORDER — ALIROCUMAB 75 MG/ML
1 INJECTION, SOLUTION SUBCUTANEOUS EVERY 2 WEEKS
Qty: 2.24 ML | Refills: 3 | Status: SHIPPED | OUTPATIENT
Start: 2024-01-09 | End: 2024-01-09

## 2024-01-09 RX ORDER — EVOLOCUMAB 140 MG/ML
1 INJECTION, SOLUTION SUBCUTANEOUS EVERY 2 WEEKS
Qty: 2.1 ML | Refills: 3 | Status: SHIPPED | OUTPATIENT
Start: 2024-01-09 | End: 2024-02-08

## 2024-01-09 NOTE — PROGRESS NOTES
REASON FOR VISIT:    Lanre Melchor is a 68 year old male who presents for an Annual Health Assessment.        Patient Active Problem List   Diagnosis    Arthralgia of left hip    Hypokalemia    Elevated troponin    Essential hypertension with goal blood pressure less than 130/80    TANYA (obstructive sleep apnea)    Acute gout involving toe of left foot    Cough    Chronic GERD    Diverticulosis    High cholesterol    History of cardiac arrhythmia    Degenerative disc disease, cervical    Chronic obstructive pulmonary disease, unspecified (HCC)    Tympanic membrane central perforation, left    Mixed conductive and sensorineural hearing loss of left ear with restricted hearing of right ear    ETD (Eustachian tube dysfunction), left     General Health           CAGE:           Depression Screening (PHQ-2/PHQ-9):  Over the LAST 2 WEEKS             PREVENTATIVE SERVICES  INDICATIONS AND SCHEDULE Recommendation Internal Lab or Procedure   Colonoscopy Screen Every 10 years Health Maintenance   Topic Date Due    Colorectal Cancer Screening  02/14/2024      Flex Sigmoidoscopy Screen  Every 5 years No results found for this or any previous visit.   Fecal Occult Blood  Annually No results found for: \"FOB\", \"OCCULTSTOOL\"   Obesity Screening Screen all adults annually Body mass index is 39.98 kg/m².     Preventive Services for Which Recommendations Vary with Risk Recommendation Internal Lab or Procedure   Cholesterol Screening Recommended screening varies with age, risk and gender LDL Cholesterol (mg/dL)   Date Value   05/12/2023 139 (H)      Diabetes Screening  If history of high blood pressure or other  risk factors No results found for: \"A1C\"  Glucose (mg/dL)   Date Value   05/12/2023 97        Gonorrhea Screening If high risk No results found for: \"GONOCOCCUS\"   HIV Screening For all adults age 18-65, older adults at increased risk No results found for: \"HIV\"   Syphilis Screening Screen if pregnant or high risk No  results found for: \"RPR\"   Hepatitis C Screening Screen pts at high risk plus screen one time for adults born 1945 - 1965 No results found for: \"HCVAB\"   Tuberculosis Screen If high risk No components found for: \"PPDINDURAT\"       SPECIFIC DISEASE MONITORING Internal Lab or Procedure     Annual Monitoring of Persistent Medications  (ACE/ARB, Digoxin, Diuretics)        Potassium  Annually Potassium (mmol/L)   Date Value   05/12/2023 4.0     POTASSIUM (P) (mmol/L)   Date Value   11/19/2015 3.9       Creatinine  Annually Creatinine (mg/dL)   Date Value   05/12/2023 0.86       Digoxin Serum Conc  Annually No results found for: \"DIGOXIN\"     COPD     Spirometry Testing Annually No results found for this or any previous visit.       ALLERGIES:     Allergies   Allergen Reactions    Lisinopril ANAPHYLAXIS    Pravastatin PAIN     Severe joint pain.       CURRENT MEDICATIONS:   Current Outpatient Medications   Medication Sig Dispense Refill    Evolocumab (REPATHA SURECLICK) 140 MG/ML Subcutaneous Solution Auto-injector Inject 1 Application into the skin Every 2 (two) weeks. 2.1 mL 3    telmisartan 80 MG Oral Tab Take 1 tablet (80 mg total) by mouth daily. 90 tablet 3      MEDICAL INFORMATION:   Past Medical History:   Diagnosis Date    Arrhythmia     Svt    BCC (basal cell carcinoma) 2023    left forearm proximal    BCC (basal cell carcinoma) 2023    left forearm distal    High blood pressure     Other and unspecified hyperlipidemia     Sleep apnea     not using machine    SVT (supraventricular tachycardia)     Unspecified essential hypertension       Past Surgical History:   Procedure Laterality Date    OTHER SURGICAL HISTORY  02/2017    heart ablation     OTHER SURGICAL HISTORY  08/22/2023    1. Left tympanoplasty, 2. Cartilage graft, 3. Left eustachian tube balloon dilation 4. Facial nerve monitoring      Family History   Problem Relation Age of Onset    Cancer Father         Brain    Skin cancer Father     Cancer Daughter          sarcoma    Melanoma Brother     Melanoma Brother      NO FAMILY HISTORY OF PROSTATE OR COLON CANCER     SOCIAL HISTORY:   Social History     Socioeconomic History    Marital status:    Tobacco Use    Smoking status: Former     Types: Cigarettes    Smokeless tobacco: Never   Vaping Use    Vaping Use: Never used   Substance and Sexual Activity    Alcohol use: Yes     Alcohol/week: 0.0 standard drinks of alcohol     Comment: Occasionally    Drug use: No   Other Topics Concern    Outdoor occupation No    Reaction to local anesthetic No    Pt has a pacemaker No    Pt has a defibrillator No     Occ:  :       REVIEW OF SYSTEMS:   GENERAL: feels well otherwise  SKIN: denies any unusual skin lesions  EYES: denies blurred vision or double vision  HEENT: denies nasal congestion, sinus pain or ST  LUNGS: denies shortness of breath with exertion  CARDIOVASCULAR: denies chest pain on exertion  GI: denies abdominal pain, denies heartburn  : denies nocturia or changes in stream  MUSCULOSKELETAL: denies back pain  NEURO: denies headaches  PSYCHE: denies depression or anxiety  HEMATOLOGIC: denies hx of anemia  ENDOCRINE: denies thyroid history  ALL/ASTHMA: denies hx of allergy or asthma  NO BLOOD IN STOOL  EXAM:   /75 (BP Location: Left arm, Patient Position: Sitting)   Pulse 80   Ht 6' 1\" (1.854 m)   Wt (!) 303 lb (137.4 kg)   BMI 39.98 kg/m²   >   BP Readings from Last 3 Encounters:   01/09/24 118/75   08/22/23 (!) 164/88   08/03/23 134/82        GENERAL: well developed, well nourished, in no apparent distress, obese  SKIN: no rashes, no suspicious lesions  HEENT: atraumatic, normocephalic, ears and throat are clear  EYES:PERRLA, EOMI, conjunctiva are clear.    NECK: supple, no adenopathy, no bruits  CHEST: no chest tenderness  LUNGS: clear to auscultation  CARDIO: RRR without murmur  GI: good BS's, no masses, HSM or tenderness  : two descended testes, no masses, no hernia, no penile  lesions  RECTAL: deferred  MUSCULOSKELETAL: back is not tender, FROM of the back  EXTREMITIES: no cyanosis, clubbing or edema  NEURO: Oriented times three, cranial nerves are intact, motor and sensory are grossly intact         ASSESSMENT AND OTHER RELEVANT CHRONIC CONDITIONS:   Lanre Melchor is a 68 year old male who presents for an Annual Health Assessment.     PLAN SUMMARY:   Diagnoses and all orders for this visit:    Routine physical examination    Essential hypertension with goal blood pressure less than 130/80  -     Comp Metabolic Panel (14); Future  -     Lipid Panel; Future  -     TSH W Reflex To Free T4; Future    TANYA (obstructive sleep apnea)    Chronic GERD    Other emphysema (HCC)    Hyperlipidemia, unspecified hyperlipidemia type  -     Discontinue: Alirocumab (PRALUENT) 75 MG/ML Subcutaneous Solution Auto-injector; Inject 1 Application into the skin Every 2 (two) weeks for 28 days.  -     Evolocumab (REPATHA SURECLICK) 140 MG/ML Subcutaneous Solution Auto-injector; Inject 1 Application into the skin Every 2 (two) weeks.    History of PSVT (paroxysmal supraventricular tachycardia)    Encounter for screening colonoscopy  -     Gastro Referral - [place] ([facility])       The patient indicates understanding of these issues and agrees to the plan.  No follow-ups on file.  Diet counseling perfomed    SUGGESTED VACCINATIONS - Influenza, Pneumococcal, Zoster, Tetanus, HPV   Influenza: Influenza Vaccine(1) due on 10/01/2023  Pneumonia: No recommendations at this time  HPV: No recommendations at this time  Tdap: No recommendations at this time  Shingles:      Influenza Annually   Pneumococcal if high risk   Td/Tdap once then every 10 years   HPV Males 11-21   Zoster (Shingles) 60 and older: one dose   Varicella 2 doses if not immune   MMR 1-2 doses if born after 1956 and not immune     1. Routine physical examination  # Flu shot at work    2. Essential hypertension with goal blood pressure  less than 130/80  Controlled on telmisartan  - Comp Metabolic Panel (14); Future  - Lipid Panel; Future  - TSH W Reflex To Free T4; Future    3. TANYA (obstructive sleep apnea)  Follow-up if fatigue is significant    4. Chronic GERD  No complaint at this time    5. Other emphysema (HCC)  No dyspnea    6. Hyperlipidemia, unspecified hyperlipidemia type  Did not tolerate statin medication could not walk with muscle soreness and stiffness from 2-3 statins had seen lipid specialty medication was not tolerable that he was put on by lipid specialty  - Evolocumab (REPATHA SURECLICK) 140 MG/ML Subcutaneous Solution Auto-injector; Inject 1 Application into the skin Every 2 (two) weeks.  Dispense: 2.1 mL; Refill: 3    7. History of PSVT (paroxysmal supraventricular tachycardia)    Status post ablation  8. Encounter for screening colonoscopy    - Gastro Referral - [place] ([facility])

## 2024-01-26 ENCOUNTER — LAB ENCOUNTER (OUTPATIENT)
Dept: LAB | Facility: HOSPITAL | Age: 69
End: 2024-01-26
Attending: FAMILY MEDICINE
Payer: COMMERCIAL

## 2024-01-26 DIAGNOSIS — I10 ESSENTIAL HYPERTENSION WITH GOAL BLOOD PRESSURE LESS THAN 130/80: ICD-10-CM

## 2024-01-26 LAB
ALBUMIN SERPL-MCNC: 4.2 G/DL (ref 3.2–4.8)
ALBUMIN/GLOB SERPL: 1.6 {RATIO} (ref 1–2)
ALP LIVER SERPL-CCNC: 69 U/L
ALT SERPL-CCNC: 28 U/L
ANION GAP SERPL CALC-SCNC: 8 MMOL/L (ref 0–18)
AST SERPL-CCNC: 23 U/L (ref ?–34)
BILIRUB SERPL-MCNC: 0.6 MG/DL (ref 0.2–1.1)
BUN BLD-MCNC: 17 MG/DL (ref 9–23)
BUN/CREAT SERPL: 19.1 (ref 10–20)
CALCIUM BLD-MCNC: 9.2 MG/DL (ref 8.7–10.4)
CHLORIDE SERPL-SCNC: 107 MMOL/L (ref 98–112)
CHOLEST SERPL-MCNC: 183 MG/DL (ref ?–200)
CO2 SERPL-SCNC: 25 MMOL/L (ref 21–32)
CREAT BLD-MCNC: 0.89 MG/DL
EGFRCR SERPLBLD CKD-EPI 2021: 93 ML/MIN/1.73M2 (ref 60–?)
FASTING PATIENT LIPID ANSWER: YES
FASTING STATUS PATIENT QL REPORTED: YES
GLOBULIN PLAS-MCNC: 2.7 G/DL (ref 2.8–4.4)
GLUCOSE BLD-MCNC: 101 MG/DL (ref 70–99)
HDLC SERPL-MCNC: 43 MG/DL (ref 40–59)
LDLC SERPL CALC-MCNC: 116 MG/DL (ref ?–100)
NONHDLC SERPL-MCNC: 140 MG/DL (ref ?–130)
OSMOLALITY SERPL CALC.SUM OF ELEC: 292 MOSM/KG (ref 275–295)
POTASSIUM SERPL-SCNC: 4.6 MMOL/L (ref 3.5–5.1)
PROT SERPL-MCNC: 6.9 G/DL (ref 5.7–8.2)
SODIUM SERPL-SCNC: 140 MMOL/L (ref 136–145)
TRIGL SERPL-MCNC: 135 MG/DL (ref 30–149)
TSI SER-ACNC: 1.98 MIU/ML (ref 0.55–4.78)
VLDLC SERPL CALC-MCNC: 23 MG/DL (ref 0–30)

## 2024-01-26 PROCEDURE — 84443 ASSAY THYROID STIM HORMONE: CPT

## 2024-01-26 PROCEDURE — 80053 COMPREHEN METABOLIC PANEL: CPT

## 2024-01-26 PROCEDURE — 80061 LIPID PANEL: CPT

## 2024-01-26 PROCEDURE — 36415 COLL VENOUS BLD VENIPUNCTURE: CPT

## 2024-03-03 ENCOUNTER — OFFICE VISIT (OUTPATIENT)
Dept: FAMILY MEDICINE CLINIC | Facility: CLINIC | Age: 69
End: 2024-03-03
Payer: COMMERCIAL

## 2024-03-03 VITALS
HEART RATE: 71 BPM | OXYGEN SATURATION: 97 % | SYSTOLIC BLOOD PRESSURE: 136 MMHG | WEIGHT: 303 LBS | BODY MASS INDEX: 40.16 KG/M2 | HEIGHT: 73 IN | DIASTOLIC BLOOD PRESSURE: 82 MMHG | RESPIRATION RATE: 16 BRPM | TEMPERATURE: 98 F

## 2024-03-03 DIAGNOSIS — J06.9 VIRAL URI WITH COUGH: Primary | ICD-10-CM

## 2024-03-03 LAB
OPERATOR ID: NORMAL
RAPID SARS-COV-2 BY PCR: NOT DETECTED

## 2024-03-03 RX ORDER — BENZONATATE 200 MG/1
200 CAPSULE ORAL 3 TIMES DAILY PRN
Qty: 30 CAPSULE | Refills: 0 | Status: SHIPPED | OUTPATIENT
Start: 2024-03-03

## 2024-03-03 RX ORDER — FLUTICASONE PROPIONATE 50 MCG
2 SPRAY, SUSPENSION (ML) NASAL DAILY
Qty: 1 EACH | Refills: 0 | Status: SHIPPED | OUTPATIENT
Start: 2024-03-03 | End: 2024-03-17

## 2024-03-03 NOTE — PATIENT INSTRUCTIONS
-Benzonatate as prescribed for dry cough   -Push fluids and plenty of rest    -OTC cough medicine such as coricidin congested cough.    -OTC Tylenol/Ibuprofen as packet insert If no allergies  -Soothing cough drops as packet insert   -Flonase.  Stop if any nose bleeds  -Claritin or Zyrtec as box instructions    -Good handwashing, to prevent spread of virus    -Face mask helps prevent viral infections    Follow up in 3-5 days for worsening symptoms with clinic or PCP

## 2024-03-03 NOTE — PROGRESS NOTES
CHIEF COMPLAINT:     Chief Complaint   Patient presents with    Cough     Sx yesterday - Dry cough, nasal congestion, slight sinus pressure, headaches, chest congestion, body aches  Denies ST, fever, chest pain, SOB  No Covid test was done at home  OTC Mucinex       HPI:   Lanre Melchor is a 68 year old male who presents for upper respiratory symptoms for  1 days. Patient reports  cough .  Associated symptoms include dry cough, nasal congestion, slight sinus pressure, headache, chest congestion, body aches .  Denies fever, chills, chest pain, shortness of breath.   Symptoms have been persistent since onset.  Treating symptoms with Mucinex.     + hx of COVID; No COVID exposure  No home Covid test done.       Current Outpatient Medications   Medication Sig Dispense Refill    fluticasone propionate 50 MCG/ACT Nasal Suspension 2 sprays by Each Nare route daily for 14 days. 1 each 0    benzonatate 200 MG Oral Cap Take 1 capsule (200 mg total) by mouth 3 (three) times daily as needed for cough. Take with a full glass of water and DO NOT CHEW. 30 capsule 0    telmisartan 80 MG Oral Tab Take 1 tablet (80 mg total) by mouth daily. 90 tablet 3      Past Medical History:   Diagnosis Date    Arrhythmia     Svt    BCC (basal cell carcinoma) 2023    left forearm proximal    BCC (basal cell carcinoma) 2023    left forearm distal    High blood pressure     Other and unspecified hyperlipidemia     Sleep apnea     not using machine    SVT (supraventricular tachycardia)     Unspecified essential hypertension       Past Surgical History:   Procedure Laterality Date    OTHER SURGICAL HISTORY  02/2017    heart ablation     OTHER SURGICAL HISTORY  08/22/2023    1. Left tympanoplasty, 2. Cartilage graft, 3. Left eustachian tube balloon dilation 4. Facial nerve monitoring         Social History     Socioeconomic History    Marital status:    Tobacco Use    Smoking status: Former     Types: Cigarettes    Smokeless tobacco:  Never   Vaping Use    Vaping Use: Never used   Substance and Sexual Activity    Alcohol use: Yes     Alcohol/week: 0.0 standard drinks of alcohol     Comment: Occasionally    Drug use: No   Other Topics Concern    Outdoor occupation No    Reaction to local anesthetic No    Pt has a pacemaker No    Pt has a defibrillator No         REVIEW OF SYSTEMS:   GENERAL: feels well otherwise,   Good appetite  SKIN: no rashes or abnormal skin lesions  HEENT: See HPI  LUNGS: denies shortness of breath or wheezing, See HPI  CARDIOVASCULAR: denies chest pain or palpitations   GI: denies N/V/C or abdominal pain  NEURO: headaches    EXAM:   /82   Pulse 71   Temp 98.4 °F (36.9 °C)   Resp 16   Ht 6' 1\" (1.854 m)   Wt (!) 303 lb (137.4 kg)   SpO2 97%   BMI 39.98 kg/m²   GENERAL: well developed, well nourished, in no apparent distress  SKIN: no rashes,no suspicious lesions  HEAD: atraumatic, normocephalic.  No tenderness on palpation of sinuses  EYES: conjunctiva clear  EARS: TM's clear, no bulging, no retraction,+ fluid, bony landmarks visualized.  Left TM scarred.   NOSE: Nostrils patent, clear nasal discharge, nasal mucosa pink and not inflamed  THROAT: Oral mucosa pink, moist. Posterior pharynx is clear erythematous. + PND.  Tonsils 1/4.    NECK: Supple, non-tender  LUNGS: clear to auscultation bilaterally; good air movement.  Breathing is non labored.  CARDIO: RRR without murmur  EXTREMITIES: no cyanosis, clubbing or edema  LYMPH:  No cervical lymphadenopathy.      Results for orders placed or performed in visit on 03/03/24   Rapid Covid-19    Collection Time: 03/03/24  7:21 AM    Specimen: Nares   Result Value Ref Range    Rapid SARS-CoV-2 by PCR Not Detected Not Detected    POCT Lot Number [id number]     POCT Expiration Date 8/28/2025     POCT Procedure Control Control Valid Control Valid     ,123            ASSESSMENT AND PLAN:   Lanre Melchor is a 68 year old male who presents with      ASSESSMENT:   Encounter Diagnosis   Name Primary?    Viral URI with cough Yes       PLAN:   Covid negative  Meds as below.  Risks, benefits, and side effects of medication explained and discussed.  Discussed likely viral etiology. Reviewed symptom relief measures with patient.   To f/u with PCP if sx do not resolve as anticipated.      Meds & Refills for this Visit:  Requested Prescriptions     Signed Prescriptions Disp Refills    fluticasone propionate 50 MCG/ACT Nasal Suspension 1 each 0     Si sprays by Each Nare route daily for 14 days.    benzonatate 200 MG Oral Cap 30 capsule 0     Sig: Take 1 capsule (200 mg total) by mouth 3 (three) times daily as needed for cough. Take with a full glass of water and DO NOT CHEW.           Patient Instructions   -Benzonatate as prescribed for dry cough   -Push fluids and plenty of rest    -OTC cough medicine such as coricidin congested cough.    -OTC Tylenol/Ibuprofen as packet insert If no allergies  -Soothing cough drops as packet insert   -Flonase.  Stop if any nose bleeds  -Claritin or Zyrtec as box instructions    -Good handwashing, to prevent spread of virus    -Face mask helps prevent viral infections    Follow up in 3-5 days for worsening symptoms with clinic or PCP       The patient indicates understanding of these issues and agrees to the plan.

## 2024-03-08 ENCOUNTER — OFFICE VISIT (OUTPATIENT)
Dept: INTERNAL MEDICINE CLINIC | Facility: CLINIC | Age: 69
End: 2024-03-08
Payer: COMMERCIAL

## 2024-03-08 ENCOUNTER — PATIENT MESSAGE (OUTPATIENT)
Dept: FAMILY MEDICINE CLINIC | Facility: CLINIC | Age: 69
End: 2024-03-08

## 2024-03-08 ENCOUNTER — NURSE TRIAGE (OUTPATIENT)
Dept: FAMILY MEDICINE CLINIC | Facility: CLINIC | Age: 69
End: 2024-03-08

## 2024-03-08 VITALS
RESPIRATION RATE: 18 BRPM | TEMPERATURE: 98 F | DIASTOLIC BLOOD PRESSURE: 83 MMHG | SYSTOLIC BLOOD PRESSURE: 130 MMHG | OXYGEN SATURATION: 96 % | HEART RATE: 68 BPM | WEIGHT: 305 LBS | BODY MASS INDEX: 40.42 KG/M2 | HEIGHT: 73 IN

## 2024-03-08 DIAGNOSIS — J20.9 ACUTE BRONCHITIS, UNSPECIFIED ORGANISM: Primary | ICD-10-CM

## 2024-03-08 PROCEDURE — 3075F SYST BP GE 130 - 139MM HG: CPT | Performed by: INTERNAL MEDICINE

## 2024-03-08 PROCEDURE — 99213 OFFICE O/P EST LOW 20 MIN: CPT | Performed by: INTERNAL MEDICINE

## 2024-03-08 PROCEDURE — 3079F DIAST BP 80-89 MM HG: CPT | Performed by: INTERNAL MEDICINE

## 2024-03-08 PROCEDURE — 3008F BODY MASS INDEX DOCD: CPT | Performed by: INTERNAL MEDICINE

## 2024-03-08 RX ORDER — METHYLPREDNISOLONE 4 MG/1
TABLET ORAL
Qty: 1 EACH | Refills: 0 | Status: SHIPPED | OUTPATIENT
Start: 2024-03-08

## 2024-03-08 RX ORDER — CODEINE PHOSPHATE/GUAIFENESIN 10-100MG/5
LIQUID (ML) ORAL
Qty: 180 ML | Refills: 0 | Status: SHIPPED | OUTPATIENT
Start: 2024-03-08

## 2024-03-08 RX ORDER — ALBUTEROL SULFATE 90 UG/1
2 AEROSOL, METERED RESPIRATORY (INHALATION) EVERY 6 HOURS PRN
Qty: 1 EACH | Refills: 0 | Status: SHIPPED | OUTPATIENT
Start: 2024-03-08

## 2024-03-08 NOTE — TELEPHONE ENCOUNTER
Cely Sanabria, RN 3/8/2024 8:11 AM CST        ----- Message -----  From: Lanre Melchor  Sent: 3/8/2024 5:14 AM CST  To: Em Triage Support  Subject: Cold coughing     I went to immediate care last Sunday, because of a bad cold and cough. They gave me some pills for cough. Coughing is getting Worse. I’m not sleeping what can I do next?

## 2024-03-08 NOTE — TELEPHONE ENCOUNTER
Action Requested: Summary for Provider     []  Critical Lab, Recommendations Needed  [] Need Additional Advice  []   FYI    []   Need Orders  [] Need Medications Sent to Pharmacy  []  Other     SUMMARY: Patient calling today with continued cough, not improving since being seen last week. At night when he lays down seems to get worse, has some wheezing. Productive cough but clear discharge. No CP or SOB - taking cough pills given but not very helpful.     Scheduled today for visit.     Future Appointments   Date Time Provider Department Center   3/8/2024  1:20 PM Dianelys Jeffrey MD [id number]  [place]   3/11/2024  2:00 PM GI COLON SCREENING [id number] None         Reason for call: Cough  Onset: last weekend       Reason for Disposition   Wheezing is present    Protocols used: Cough-A-OH

## 2024-03-08 NOTE — PROGRESS NOTES
Lanre Melchor is a 68 year old male.  Chief Complaint   Patient presents with    Nasal Congestion    Cough     HPI:    Patient presented today for cough and wheezing. He states that he has been having productive cough for last one week along with wheezing specially when he lays down. No fever, no throat pain, no head congestion or any other complains.       Current Outpatient Medications   Medication Sig Dispense Refill    methylPREDNISolone (MEDROL) 4 MG Oral Tablet Therapy Pack As directed. 1 each 0    albuterol 108 (90 Base) MCG/ACT Inhalation Aero Soln Inhale 2 puffs into the lungs every 6 (six) hours as needed for Wheezing (cough). 1 each 0    guaiFENesin-Codeine 100-10 MG/5ML Oral Syrup 1 tsp qid prn.  Do not drive for 6 hours after taking this medication.  Causes drowsiness. 180 mL 0    fluticasone propionate 50 MCG/ACT Nasal Suspension 2 sprays by Each Nare route daily for 14 days. 1 each 0    benzonatate 200 MG Oral Cap Take 1 capsule (200 mg total) by mouth 3 (three) times daily as needed for cough. Take with a full glass of water and DO NOT CHEW. 30 capsule 0    telmisartan 80 MG Oral Tab Take 1 tablet (80 mg total) by mouth daily. 90 tablet 3      Past Medical History:   Diagnosis Date    Arrhythmia     Svt    BCC (basal cell carcinoma) 2023    left forearm proximal    BCC (basal cell carcinoma) 2023    left forearm distal    High blood pressure     Other and unspecified hyperlipidemia     Sleep apnea     not using machine    SVT (supraventricular tachycardia)     Unspecified essential hypertension       Past Surgical History:   Procedure Laterality Date    OTHER SURGICAL HISTORY  02/2017    heart ablation     OTHER SURGICAL HISTORY  08/22/2023    1. Left tympanoplasty, 2. Cartilage graft, 3. Left eustachian tube balloon dilation 4. Facial nerve monitoring      Social History:  Social History     Socioeconomic History    Marital status:    Tobacco Use    Smoking status: Never     Smokeless tobacco: Never   Vaping Use    Vaping Use: Never used   Substance and Sexual Activity    Alcohol use: Yes     Alcohol/week: 0.0 standard drinks of alcohol     Comment: Occasionally    Drug use: No   Other Topics Concern    Outdoor occupation No    Reaction to local anesthetic No    Pt has a pacemaker No    Pt has a defibrillator No      Family History   Problem Relation Age of Onset    Cancer Father         Brain    Skin cancer Father     Cancer Daughter         sarcoma    Melanoma Brother     Melanoma Brother       Allergies   Allergen Reactions    Lisinopril ANAPHYLAXIS    Pravastatin PAIN     Severe joint pain.          REVIEW OF SYSTEMS:   Review of Systems   Review of Systems   Constitutional: Negative for activity change, appetite change and fever.   HENT: Negative for congestion and voice change.    Respiratory: Negative for cough and shortness of breath.    Cardiovascular: Negative for chest pain.   Gastrointestinal: Negative for abdominal distention, abdominal pain and vomiting.   Genitourinary: Negative for hematuria.   Skin: Negative for wound.   Psychiatric/Behavioral: Negative for behavioral problems.   Wt Readings from Last 5 Encounters:   03/08/24 (!) 305 lb (138.3 kg)   03/03/24 (!) 303 lb (137.4 kg)   01/09/24 (!) 303 lb (137.4 kg)   10/23/23 298 lb (135.2 kg)   09/13/23 298 lb (135.2 kg)     Body mass index is 40.24 kg/m².      EXAM:   /83 (BP Location: Left arm, Patient Position: Sitting, Cuff Size: large)   Pulse 68   Temp 97.5 °F (36.4 °C)   Resp 18   Ht 6' 1\" (1.854 m)   Wt (!) 305 lb (138.3 kg)   SpO2 96%   BMI 40.24 kg/m²   Physical Exam   Constitutional:       Appearance: Normal appearance.   HENT:      Head: Normocephalic.   Eyes:      Conjunctiva/sclera: Conjunctivae normal.   Cardiovascular:      Rate and Rhythm: Normal rate and regular rhythm.      Heart sounds: Normal heart sounds. No murmur heard.  Pulmonary:      Effort: Pulmonary effort is normal.      Breath  sounds: Normal breath sounds. No rhonchi or rales.   Abdominal:      General: Bowel sounds are normal.      Palpations: Abdomen is soft.      Tenderness: There is no abdominal tenderness.   Musculoskeletal:      Cervical back: Neck supple.      Right lower leg: No edema.      Left lower leg: No edema.   Skin:     General: Skin is warm and dry.   Neurological:      General: No focal deficit present.      Mental Status: He is alert and oriented to person, place, and time. Mental status is at baseline.   Psychiatric:         Mood and Affect: Mood normal.         Behavior: Behavior normal.       ASSESSMENT AND PLAN:   1. Acute bronchitis, unspecified organism  - tylenol as needed  - methylPREDNISolone (MEDROL) 4 MG Oral Tablet Therapy Pack; As directed.  Dispense: 1 each; Refill: 0  - albuterol 108 (90 Base) MCG/ACT Inhalation Aero Soln; Inhale 2 puffs into the lungs every 6 (six) hours as needed for Wheezing (cough).  Dispense: 1 each; Refill: 0  - guaiFENesin-Codeine 100-10 MG/5ML Oral Syrup; 1 tsp qid prn.  Do not drive for 6 hours after taking this medication.  Causes drowsiness.  Dispense: 180 mL; Refill: 0      The patient indicates understanding of these issues and agrees to the plan.      Dianelys Jeffrey MD

## 2024-03-11 ENCOUNTER — NURSE ONLY (OUTPATIENT)
Facility: CLINIC | Age: 69
End: 2024-03-11

## 2024-03-11 DIAGNOSIS — Z12.11 SCREEN FOR COLON CANCER: Primary | ICD-10-CM

## 2024-03-11 RX ORDER — POLYETHYLENE GLYCOL 3350, SODIUM CHLORIDE, SODIUM BICARBONATE, POTASSIUM CHLORIDE 420; 11.2; 5.72; 1.48 G/4L; G/4L; G/4L; G/4L
POWDER, FOR SOLUTION ORAL
Qty: 4000 ML | Refills: 0 | Status: SHIPPED | OUTPATIENT
Start: 2024-03-11

## 2024-03-11 NOTE — PROGRESS NOTES
Rosalia -    Called patient for TCS. He is a 68 year old male.  No complaints of GI symptoms.    Follows up with cardiologist (Dr. Pepe) for a hx of SVT    Please advise on orders  Thank you!

## 2024-03-11 NOTE — PROGRESS NOTES
Called patient for scheduled telephone colon screening  Medications, pharmacy, and allergies verified with the patient.     Age 45-64 y/o (Y/N):   66-76 y/o ? Route to GI provider per rotation schedule   › GI MD preference:   › Insurance:  [employer] HMO  › Last PCP Visit: 1/9/2024  › Last CBC drawn: 5/12/2023  › H/W/BMI: 6'1\" / 305LBS / 40.25    Special comments/notes:  Last colonoscopy over 10 years ago  Hx SVT - cardiologist Dr. Pepe   Telephone colon screening Questionnaires:  Yes No   Are you currently experiencing any new GI symptoms? [] [x]   If yes, symptom details:     Rectal Bleeding with or without bowel movements: [] [x]   Black stool: [] [x]   Dysphagia &Food feeling/getting stuck: [] [x]   Intractable Vomiting: [] [x]   Unexplained weight loss: [] [x]   First colonoscopy? [] [x]   Family history of colon cancer? [] [x]   Any issues with anesthesia? [] [x]   If yes, explain details:      Personal history of Resp. Issues/Oxygen Use/TANYA/COPD? [] [x]   If yes, CPAP/BiPAP? [] [x]   History of devices Pacemaker/Defibrillator/Stents? [] [x]   History of Cardiac/CVA issues/(MI/Stroke):  [] [x]     Medication usage:  Yes  No   Anticoagulants:  Anticoagulant (Except Aspirin) ? Route to RN staff to obtain ordering provider orders [] [x]   Diabetic Meds:   PO DM Meds ? Hold day prior and day of procedure  Insulin ? Route to RN clinical staff to obtain provider orders  [] [x]   Weight loss meds (phentermine/Vyvanse/Saxsenda): [] [x]   Iron/Herbal/Multivitamin Supplement (RX/OTC): [x] []   Usage of marijuana, CBD &/or vape products: [] [x]

## 2024-03-11 NOTE — PROGRESS NOTES
Scheduling:  cln w/ mac w/ Dr. Whyte, Dr. Tovar  Dx: crc screening  trilyte split dose sent e-scribe    Nursing:  Please get cards clearance from Dr. Pepe prior to procedure

## 2024-03-12 NOTE — PROGRESS NOTES
Scheduled for:  Colonoscopy 18135  Provider Name:    Date:  Mon, 07/29/2024  Location:  [facility]   Sedation:  [facility]  Time:  9am (pt is aware to arrive at 8am    Prep:  trilyte split dose sent e-scribe   Meds/Allergies Reconciled?:  Yes    Diagnosis with codes:  crc screening Z12.11  Was patient informed to call insurance with codes (Y/N): Yes      Referral sent?:  Referral was sent at the time of electronic surgical scheduling.    EM or [facility] notified?:  I sent an electronic request to Endo Scheduling and received a confirmation today.       Medication Orders:  Nursing:  Please get cards clearance from Dr. Pepe prior to procedure  Misc Orders:  NONE     Further instructions given by staff:  I discussed the prep instructions with the patient which he verbally understood and is aware that I will send the instructions today.via [employer]

## 2024-03-12 NOTE — PROGRESS NOTES
[facility] RN's       Per Rosalia Fung, APRN   Nursing:  Please get cards clearance from Dr. Pepe prior to procedure

## 2024-03-13 ENCOUNTER — TELEPHONE (OUTPATIENT)
Facility: CLINIC | Age: 69
End: 2024-03-13

## 2024-03-13 NOTE — TELEPHONE ENCOUNTER
Progress Notes  Aleksandra Lawrence, RN (Registered Nurse)  Registered Nurse  Pt scheduled for colonoscopy on 7/29/24. RN to get cardiac clearance closer to procedure date.        Addendum Note  Ramirez Cole  Addended by: RAMIREZ COLE on: 3/12/2024 12:19 PM       Modules accepted: Orders          Progress Notes  Ramirez Cole RN's        Per Rosalia Fung, APRN   Nursing:  Please get cards clearance from Dr. Pepe prior to procedure          Progress Notes  Ramirez Cole  Scheduled for:  Colonoscopy [id number]  Provider Name:    Date:  Mon, 07/29/2024  Location:  [facility]   Sedation:  Mac  Time:  9am (pt is aware to arrive at 8am     Prep:  trilyte split dose sent e-scribe   Meds/Allergies Reconciled?:  Yes     Diagnosis with codes:  crc screening Z12.11  Was patient informed to call insurance with codes (Y/N): Yes      Referral sent?:  Referral was sent at the time of electronic surgical scheduling.     EM or [facility] notified?:  I sent an electronic request to Endo Scheduling and received a confirmation today.        Medication Orders:  Nursing:  Please get cards clearance from Dr. Pepe prior to procedure  Misc Orders:  NONE

## 2024-03-14 NOTE — TELEPHONE ENCOUNTER
Dr Jeffrey: please review. Please advise if anything else is recommended?    LOV 3/8/24, prior to that he was seen 3/3/24.     He c/o of Constant coughing; Dry, annoying; unable to sleep at night.   No sob, no wheezing.     Symptoms for 2 and half weeks. Not resolving.     Patient was not able to get guaifenesin/codeine (out of stock).   Benzonatate doesn't help.     He is using albuterol prn.

## 2024-03-15 ENCOUNTER — TELEPHONE (OUTPATIENT)
Dept: FAMILY MEDICINE CLINIC | Facility: CLINIC | Age: 69
End: 2024-03-15

## 2024-03-15 NOTE — TELEPHONE ENCOUNTER
Please see 3/8/24 [employer] message.  A new medication was sent to the pharmacy.  I called [facility] and cancelled the script.          Response to cancel request received from pharmacy.  Received: Yesterday  [facility]  The pharmacy received the electronic cancel request, but could not cancel the prescription. Additional follow up tasks may be necessary based on the pharmacy response noted below.    Pharmacy Note: Unable to Cancel Rx. Please contact Pharmacy          Pharmacy    [id number] - [place], [place] - [address] AT [facility] [address], [phone], [phone]   [address] [facility] [address]   Phone: [phone] Fax: [phone]   Hours: Not open 24 hours     Outpatient Medication Detail     Disp Refills Start End    guaiFENesin-Codeine 100-10 MG/5ML Oral Syrup (Discontinued) 180 mL 0 3/8/2024 3/14/2024    Si tsp qid prn.  Do not drive for 6 hours after taking this medication.  Causes drowsiness.    Sent to pharmacy as: guaiFENesin-Codeine 100-10 MG/5ML Oral Syrup    Reason for Discontinue: Stop This Medication     E-Prescribing Status: Receipt confirmed by pharmacy (3/8/2024  1:39 PM CST)    E-Cancel Status: Request denied by pharmacy (3/14/2024  8:54 PM CDT)       E-Cancel Status Note: Unable to Cancel Rx. Please contact Pharmacy      Order Providers    Prescribing Provider Encounter Provider   Dianelys Jeffrey MD Kagzi, Yasmin Irfan, MD       Associated Diagnoses    Acute bronchitis, unspecified organism  - Primary        Encounter    View Encounter              This Order Has Been Discontinued    Order Status Reason By On   Discontinued Stop This Medication  Betsey Acevedo PA-C

## 2024-03-27 ENCOUNTER — OFFICE VISIT (OUTPATIENT)
Dept: DERMATOLOGY CLINIC | Facility: CLINIC | Age: 69
End: 2024-03-27
Payer: COMMERCIAL

## 2024-03-27 DIAGNOSIS — D49.2 NEOPLASM OF UNSPECIFIED BEHAVIOR OF BONE, SOFT TISSUE, AND SKIN: ICD-10-CM

## 2024-03-27 DIAGNOSIS — L82.1 SEBORRHEIC KERATOSES: ICD-10-CM

## 2024-03-27 DIAGNOSIS — D22.9 MULTIPLE BENIGN NEVI: Primary | ICD-10-CM

## 2024-03-27 DIAGNOSIS — L57.0 MULTIPLE ACTINIC KERATOSES: ICD-10-CM

## 2024-03-27 DIAGNOSIS — L81.4 LENTIGINES: ICD-10-CM

## 2024-03-27 DIAGNOSIS — D18.01 CHERRY ANGIOMA: ICD-10-CM

## 2024-03-27 PROCEDURE — 11102 TANGNTL BX SKIN SINGLE LES: CPT | Performed by: STUDENT IN AN ORGANIZED HEALTH CARE EDUCATION/TRAINING PROGRAM

## 2024-03-27 PROCEDURE — 17003 DESTRUCT PREMALG LES 2-14: CPT | Performed by: STUDENT IN AN ORGANIZED HEALTH CARE EDUCATION/TRAINING PROGRAM

## 2024-03-27 PROCEDURE — 99213 OFFICE O/P EST LOW 20 MIN: CPT | Performed by: STUDENT IN AN ORGANIZED HEALTH CARE EDUCATION/TRAINING PROGRAM

## 2024-03-27 PROCEDURE — 17000 DESTRUCT PREMALG LESION: CPT | Performed by: STUDENT IN AN ORGANIZED HEALTH CARE EDUCATION/TRAINING PROGRAM

## 2024-03-27 PROCEDURE — 88305 TISSUE EXAM BY PATHOLOGIST: CPT | Performed by: STUDENT IN AN ORGANIZED HEALTH CARE EDUCATION/TRAINING PROGRAM

## 2024-03-27 PROCEDURE — 11103 TANGNTL BX SKIN EA SEP/ADDL: CPT | Performed by: STUDENT IN AN ORGANIZED HEALTH CARE EDUCATION/TRAINING PROGRAM

## 2024-03-27 NOTE — PROGRESS NOTES
March 27, 2024    Established patient     CHIEF COMPLAINT: FBSE    HISTORY OF PRESENT ILLNESS: .    - No particular lesions of concern.       DERM HISTORY:  History of skin cancer: Yes, SCC    FAMILY HISTORY:  History of melanoma: No    PAST MEDICAL HISTORY:  Past Medical History:   Diagnosis Date    Arrhythmia     Svt    BCC (basal cell carcinoma) 2023    left forearm proximal    BCC (basal cell carcinoma) 2023    left forearm distal    High blood pressure     Obesity     Other and unspecified hyperlipidemia     Sleep apnea     not using machine    SVT (supraventricular tachycardia)     Unspecified essential hypertension        REVIEW OF SYSTEMS:  Constitutional: Denies fever, chills, unintentional weight loss.   Skin as per HPI    Medications  Current Outpatient Medications   Medication Sig Dispense Refill    dextromethorphan-guaiFENesin  MG/5ML Oral Liquid Take 10 mL by mouth every 4 to 6 hours. 118 mL 0    PEG 3350-KCl-Na Bicarb-NaCl (TRILYTE) 420 g Oral Recon Soln Take prep as directed by gastro office. May substitute with Trilyte/generic equivalent if needed. 4000 mL 0    methylPREDNISolone (MEDROL) 4 MG Oral Tablet Therapy Pack As directed. 1 each 0    albuterol 108 (90 Base) MCG/ACT Inhalation Aero Soln Inhale 2 puffs into the lungs every 6 (six) hours as needed for Wheezing (cough). 1 each 0    benzonatate 200 MG Oral Cap Take 1 capsule (200 mg total) by mouth 3 (three) times daily as needed for cough. Take with a full glass of water and DO NOT CHEW. 30 capsule 0    telmisartan 80 MG Oral Tab Take 1 tablet (80 mg total) by mouth daily. 90 tablet 3       PHYSICAL EXAM:  Patient declined chaperone   General: awake, alert, no acute distress  Skin: Skin exam was performed today including the following: head and face, scalp, neck, chest (including breasts and axillae), abdomen, back, bilateral upper extremities, bilateral lower extremities, hands, feet, digits, nails. Pertinent findings include:   -  Scattered bright red-purple dome-shaped papules on the trunk and extremities   - Scattered light brown stellate macules on sun exposed sites  - Scattered, evenly colored, round brown macules and papules with regular borders on the trunk and extremities  - Numerous scattered skin-colored and brown, waxy, stuck-on papules and plaques on the trunk and extremities      ASSESSMENT & PLAN:  Pathophysiology of diagnoses discussed with patient.  Therapeutic options reviewed. Risks, benefits, and alternatives discussed with patient. Instructions reviewed at length.    #Lentigines  #Seborrheic keratoses   #Cherry angiomas   - Reassurance provided regarding the benign nature of these lesions.    #Multiple benign nevi  - Complete skin exam performed today with no outlier lesions identified   - Reassured patient of benign nature of these lesions.   - Recommend daily photoprotection with broad-spectrum sunscreen, avoidance of sun during peak hours, and sun protective clothing.    - Dermoscopy was used for physical examination of pigmented lesions during today's office visit.    #Multiple actinic keratoses  - Discussed premalignant etiology and possibility of transformation to SCC  - Recommended cryotherapy today   - Discussed side effects including redness, swelling, crusting, and discolortion after treatment, wound care with soap/water and vaseline   - Recommend sun protection with spf 30 or higher, sun protective clothing such as wide brimmed hats and long sleeves. Recommend avoiding midday sun (10 am- 3 pm).     - Procedure Note Cryosurgery of pre-malignant lesion(s)  Risks, benefits, alternatives, complications, and personnel required for cryosurgery reviewed with patient. Patient verbalizes understanding and wishes to proceed.   - Cryosurgery performed with Liquid Nitrogen via cryostat spray gun to Actinic Keratosis . 7 lesion(s) treated.   - Patient tolerated well and wound care discussed. Return if lesions fail to fully  resolve.    #Neoplasm(s) of uncertain behavior of skin  - Shave biopsy performed today   - Will notify patient with results and arrange for appropriate definitive treatment, if indicated.      Shave of lesion to establish and confirm diagnosis:  Photo taken: Yes    Risks, benefits, alternatives and personnel required for shave biopsy reviewed with patient. Risks discussed include, but not limited to: pain, bleeding, infection, scar, reaction to anesthetic, and recurrence/need for further treatment.  Patient and physician agree as to site(s) to be biopsied. Patient verbalizes understanding and wishes to proceed.     Site(s) prepped with alcohol and anesthetized with 1% lidocaine with epinephrine.   Shave of lesion(s) performed to the level of the dermis. Specimen(s) from A.L forearm, B.R upper back, C.L frontal scalp  sent for pathology to r/o NMSC 50% ALCL and bandaging applied.   Written and verbal wound care instructions provided to patient, understanding verbalized.    Return to clinic: 6 months or sooner if something concerning arises     Sabas Holt MD

## 2024-04-16 ENCOUNTER — OFFICE VISIT (OUTPATIENT)
Dept: DERMATOLOGY CLINIC | Facility: CLINIC | Age: 69
End: 2024-04-16
Payer: COMMERCIAL

## 2024-04-16 DIAGNOSIS — C44.619 BASAL CELL CARCINOMA, ARM, LEFT: ICD-10-CM

## 2024-04-16 DIAGNOSIS — C44.519 BASAL CELL CARCINOMA OF SKIN OF TRUNK, EXCEPT SCROTUM: Primary | ICD-10-CM

## 2024-04-16 PROCEDURE — 17262 DSTRJ MAL LES T/A/L 1.1-2.0: CPT | Performed by: STUDENT IN AN ORGANIZED HEALTH CARE EDUCATION/TRAINING PROGRAM

## 2024-04-16 NOTE — PROGRESS NOTES
.PROCEDURE NOTE: ELECTRODESSICTION AND CURRETAGE      DIAGNOSIS: siperficial BCC    LOCATION: R upper back and L forearm     INDICATIONS FOR PROCEDURE: This patient presents with the biopsy-proven lesion noted above. Treatment with electrodesiccation and curettage is indicated.     INFORMED CONSENT:  The risks, benefits and anticipated outcomes of the procedure, including scarring, infection, the risks and benefits of the alternatives to the procedure, and the roles and tasks of the personnel to be involved were discussed with the patient.   Informed consent was obtained and patient was given the opportunity to ask further questions about the procedure today and wishes to proceed.    PREOPERATIVE/PROCEDURAL VERIFICATION:  Patient verified by: Name and Date of Birth  Procedural site verified by: Provider marking the site with patient involvement.  Provider physically marking the site: Sabas Holt MD  Procedure to be performed: ED&C  Staff involved: Sabas Holt MD  Relevant documentation, images,or special equipment present:    UNIVERSAL PROTOCOL/SAFETY CHECKLIST  Procedure to be performed: electrodesiccation and curettage   Sign in Communication: Completed  Time Out:  Team Confirms the Correct Patient, Correct Procedure, Correct Site and Site Marking, Correct Position (if applicable), Prep and Dry Time (if applicable).  Time:  Few minutes prior to procedure  Affirmation of Time Out: Yes  Sign Out Discussion: Completed    PROCEDURE:  A hyfrecator was used for electrodessication. The lesion is destroyed with 4 mm of normal skin peripheral to the identified margin.   Site 1: R upper Back superficial BCC  Number of Passes: 3  Pre-operative size: 1.2cm  Margins 3mm    Site 2: L forearm  superficial BCC  Number of Passes: 3  Pre-operative size: 1.0cm  Margins 3mm    Prep: alcohol  Anesthesia: Lidocaine 1% with epinephrine, 1:100,000 x 1 ml  EBL: Minimal.   Complications: Patient tolerated the procedure well with  no complications  Wound Care: Instructions provided  Dressing: Pressure dressing

## 2024-05-06 ENCOUNTER — HOSPITAL ENCOUNTER (EMERGENCY)
Facility: HOSPITAL | Age: 69
Discharge: HOME | End: 2024-05-07
Attending: EMERGENCY MEDICINE
Payer: COMMERCIAL

## 2024-05-06 ENCOUNTER — PATIENT MESSAGE (OUTPATIENT)
Dept: FAMILY MEDICINE CLINIC | Facility: CLINIC | Age: 69
End: 2024-05-06

## 2024-05-06 ENCOUNTER — APPOINTMENT (OUTPATIENT)
Dept: GENERAL RADIOLOGY | Facility: HOSPITAL | Age: 69
End: 2024-05-06
Attending: EMERGENCY MEDICINE
Payer: COMMERCIAL

## 2024-05-06 ENCOUNTER — APPOINTMENT (OUTPATIENT)
Dept: CT IMAGING | Facility: HOSPITAL | Age: 69
End: 2024-05-06
Attending: EMERGENCY MEDICINE
Payer: COMMERCIAL

## 2024-05-06 DIAGNOSIS — R50.9 FEBRILE ILLNESS, ACUTE: Primary | ICD-10-CM

## 2024-05-06 LAB
ANION GAP SERPL CALC-SCNC: 10 MMOL/L (ref 0–18)
BASOPHILS # BLD AUTO: 0.02 X10(3) UL (ref 0–0.2)
BASOPHILS NFR BLD AUTO: 0.3 %
BILIRUB UR QL: NEGATIVE
BUN BLD-MCNC: 18 MG/DL (ref 9–23)
BUN/CREAT SERPL: 19.6 (ref 10–20)
CALCIUM BLD-MCNC: 8.8 MG/DL (ref 8.7–10.4)
CHLORIDE SERPL-SCNC: 110 MMOL/L (ref 98–112)
CLARITY UR: CLEAR
CO2 SERPL-SCNC: 23 MMOL/L (ref 21–32)
CREAT BLD-MCNC: 0.92 MG/DL
DEPRECATED RDW RBC AUTO: 42.7 FL (ref 35.1–46.3)
EGFRCR SERPLBLD CKD-EPI 2021: 91 ML/MIN/1.73M2 (ref 60–?)
EOSINOPHIL # BLD AUTO: 0.03 X10(3) UL (ref 0–0.7)
EOSINOPHIL NFR BLD AUTO: 0.4 %
ERYTHROCYTE [DISTWIDTH] IN BLOOD BY AUTOMATED COUNT: 13.2 % (ref 11–15)
FLUAV + FLUBV RNA SPEC NAA+PROBE: NEGATIVE
FLUAV + FLUBV RNA SPEC NAA+PROBE: NEGATIVE
GLUCOSE BLD-MCNC: 102 MG/DL (ref 70–99)
GLUCOSE UR-MCNC: NORMAL MG/DL
HCT VFR BLD AUTO: 43 %
HGB BLD-MCNC: 15 G/DL
HGB UR QL STRIP.AUTO: NEGATIVE
IMM GRANULOCYTES # BLD AUTO: 0.04 X10(3) UL (ref 0–1)
IMM GRANULOCYTES NFR BLD: 0.6 %
KETONES UR-MCNC: NEGATIVE MG/DL
LACTATE SERPL-SCNC: 2.5 MMOL/L (ref 0.5–2)
LEUKOCYTE ESTERASE UR QL STRIP.AUTO: NEGATIVE
LYMPHOCYTES # BLD AUTO: 0.63 X10(3) UL (ref 1–4)
LYMPHOCYTES NFR BLD AUTO: 9.3 %
MCH RBC QN AUTO: 30.7 PG (ref 26–34)
MCHC RBC AUTO-ENTMCNC: 34.9 G/DL (ref 31–37)
MCV RBC AUTO: 88.1 FL
MONOCYTES # BLD AUTO: 0.41 X10(3) UL (ref 0.1–1)
MONOCYTES NFR BLD AUTO: 6 %
NEUTROPHILS # BLD AUTO: 5.68 X10 (3) UL (ref 1.5–7.7)
NEUTROPHILS # BLD AUTO: 5.68 X10(3) UL (ref 1.5–7.7)
NEUTROPHILS NFR BLD AUTO: 83.4 %
NITRITE UR QL STRIP.AUTO: NEGATIVE
OSMOLALITY SERPL CALC.SUM OF ELEC: 298 MOSM/KG (ref 275–295)
PH UR: 5.5 [PH] (ref 5–8)
PLATELET # BLD AUTO: 207 10(3)UL (ref 150–450)
POTASSIUM SERPL-SCNC: 3.9 MMOL/L (ref 3.5–5.1)
PROCALCITONIN SERPL-MCNC: 0.16 NG/ML (ref ?–0.05)
PROT UR-MCNC: NEGATIVE MG/DL
RBC # BLD AUTO: 4.88 X10(6)UL
RSV RNA SPEC NAA+PROBE: NEGATIVE
SARS-COV-2 RNA RESP QL NAA+PROBE: NOT DETECTED
SODIUM SERPL-SCNC: 143 MMOL/L (ref 136–145)
SP GR UR STRIP: 1.03 (ref 1–1.03)
UROBILINOGEN UR STRIP-ACNC: NORMAL
WBC # BLD AUTO: 6.8 X10(3) UL (ref 4–11)

## 2024-05-06 PROCEDURE — 36415 COLL VENOUS BLD VENIPUNCTURE: CPT

## 2024-05-06 PROCEDURE — 99284 EMERGENCY DEPT VISIT MOD MDM: CPT

## 2024-05-06 PROCEDURE — 83605 ASSAY OF LACTIC ACID: CPT | Performed by: EMERGENCY MEDICINE

## 2024-05-06 PROCEDURE — 93010 ELECTROCARDIOGRAM REPORT: CPT

## 2024-05-06 PROCEDURE — 99285 EMERGENCY DEPT VISIT HI MDM: CPT

## 2024-05-06 PROCEDURE — 93005 ELECTROCARDIOGRAM TRACING: CPT

## 2024-05-06 PROCEDURE — 84145 PROCALCITONIN (PCT): CPT | Performed by: EMERGENCY MEDICINE

## 2024-05-06 PROCEDURE — 81003 URINALYSIS AUTO W/O SCOPE: CPT | Performed by: EMERGENCY MEDICINE

## 2024-05-06 PROCEDURE — 71045 X-RAY EXAM CHEST 1 VIEW: CPT | Performed by: EMERGENCY MEDICINE

## 2024-05-06 PROCEDURE — 80048 BASIC METABOLIC PNL TOTAL CA: CPT | Performed by: EMERGENCY MEDICINE

## 2024-05-06 PROCEDURE — 85025 COMPLETE CBC W/AUTO DIFF WBC: CPT | Performed by: EMERGENCY MEDICINE

## 2024-05-06 PROCEDURE — 0241U SARS-COV-2/FLU A AND B/RSV BY PCR (GENEXPERT): CPT | Performed by: EMERGENCY MEDICINE

## 2024-05-06 PROCEDURE — 74177 CT ABD & PELVIS W/CONTRAST: CPT | Performed by: EMERGENCY MEDICINE

## 2024-05-06 RX ORDER — ACETAMINOPHEN 500 MG
1000 TABLET ORAL ONCE
Status: COMPLETED | OUTPATIENT
Start: 2024-05-06 | End: 2024-05-06

## 2024-05-07 ENCOUNTER — TELEPHONE (OUTPATIENT)
Dept: FAMILY MEDICINE CLINIC | Facility: CLINIC | Age: 69
End: 2024-05-07

## 2024-05-07 VITALS
HEIGHT: 73 IN | WEIGHT: 300 LBS | SYSTOLIC BLOOD PRESSURE: 116 MMHG | RESPIRATION RATE: 18 BRPM | TEMPERATURE: 99 F | BODY MASS INDEX: 39.76 KG/M2 | DIASTOLIC BLOOD PRESSURE: 61 MMHG | HEART RATE: 91 BPM | OXYGEN SATURATION: 95 %

## 2024-05-07 DIAGNOSIS — R91.8 LUNG NODULES: ICD-10-CM

## 2024-05-07 DIAGNOSIS — N50.89 TESTICULAR MASS: Primary | ICD-10-CM

## 2024-05-07 LAB
ATRIAL RATE: 104 BPM
P AXIS: 53 DEGREES
P-R INTERVAL: 136 MS
Q-T INTERVAL: 328 MS
QRS DURATION: 80 MS
QTC CALCULATION (BEZET): 431 MS
R AXIS: 17 DEGREES
T AXIS: 53 DEGREES
VENTRICULAR RATE: 104 BPM

## 2024-05-07 NOTE — ED PROVIDER NOTES
Patient Seen in: [facility] Emergency Department    History     Chief Complaint   Patient presents with    Fever       HPI    The patient presents to the ED complaining of an elevated heart rate and mild cough.  He states history of SVT in the past which made him concerned.  Also felt slightly fatigued today.  Denies other complaints.    History reviewed.   Past Medical History:    Arrhythmia    Svt    BCC (basal cell carcinoma)    left forearm proximal    BCC (basal cell carcinoma)    left forearm distal    High blood pressure    Obesity    Other and unspecified hyperlipidemia    Sleep apnea    not using machine    SVT (supraventricular tachycardia) (HCC)    Unspecified essential hypertension       History reviewed.   Past Surgical History:   Procedure Laterality Date    Colonoscopy      Other surgical history  02/2017    heart ablation     Other surgical history  08/22/2023    1. Left tympanoplasty, 2. Cartilage graft, 3. Left eustachian tube balloon dilation 4. Facial nerve monitoring    Vasectomy           Medications :  (Not in a hospital admission)       Family History   Problem Relation Age of Onset    Cancer Father         Brain    Skin cancer Father     Cancer Daughter         sarcoma    Melanoma Brother     Melanoma Brother        Smoking Status:   Social History     Socioeconomic History    Marital status:    Tobacco Use    Smoking status: Former     Passive exposure: Past    Smokeless tobacco: Never   Vaping Use    Vaping status: Never Used   Substance and Sexual Activity    Alcohol use: Yes     Alcohol/week: 4.0 standard drinks of alcohol     Types: 4 Standard drinks or equivalent per week     Comment: Occasionally    Drug use: No   Other Topics Concern    Grew up on a farm No    History of tanning Yes    Outdoor occupation No    Reaction to local anesthetic No    Pt has a pacemaker No    Pt has a defibrillator No       Constitutional and vital signs reviewed.      Social History and  Family History elements reviewed from today, pertinent positives to the presenting problem noted.    Physical Exam     ED Triage Vitals [05/06/24 2031]   /86   Pulse 113   Resp 20   Temp 100.3 °F (37.9 °C)   Temp src Oral   SpO2 94 %   O2 Device None (Room air)       All measures to prevent infection transmission during my interaction with the patient were taken. Handwashing was performed prior to and after the exam.  Stethoscope and any equipment used during my examination was cleaned with super sani-cloth germicidal wipes following the exam.     Physical Exam  Vitals and nursing note reviewed.   Constitutional:       General: He is not in acute distress.     Appearance: He is well-developed. He is obese.   HENT:      Head: Normocephalic and atraumatic.      Nose: Nose normal.      Mouth/Throat:      Mouth: Mucous membranes are moist.      Pharynx: Oropharynx is clear.   Eyes:      General:         Right eye: No discharge.         Left eye: No discharge.      Conjunctiva/sclera: Conjunctivae normal.   Neck:      Trachea: No tracheal deviation.   Cardiovascular:      Rate and Rhythm: Regular rhythm. Tachycardia present.      Heart sounds: Normal heart sounds.   Pulmonary:      Effort: Respiratory distress present.      Breath sounds: Normal breath sounds. No stridor. No wheezing, rhonchi or rales.   Abdominal:      General: There is no distension.      Palpations: Abdomen is soft.      Tenderness: There is no abdominal tenderness. There is no guarding or rebound.   Musculoskeletal:         General: No tenderness or deformity.      Cervical back: Neck supple. No rigidity.   Skin:     General: Skin is warm and dry.   Neurological:      Mental Status: He is alert and oriented to person, place, and time.   Psychiatric:         Mood and Affect: Mood normal.         Behavior: Behavior normal.         ED Course        Labs Reviewed   BASIC METABOLIC PANEL (8) - Abnormal; Notable for the following components:        Result Value    Glucose 102 (*)     Calculated Osmolality 298 (*)     All other components within normal limits   PROCALCITONIN - Abnormal; Notable for the following components:    Procalcitonin 0.16 (*)     All other components within normal limits   LACTIC ACID, PLASMA - Abnormal; Notable for the following components:    Lactic Acid 2.5 (*)     All other components within normal limits   CBC W/ DIFFERENTIAL - Abnormal; Notable for the following components:    Lymphocyte Absolute 0.63 (*)     All other components within normal limits   SARS-COV-2/FLU A AND B/RSV BY PCR (GENEXPERT) - Normal    Narrative:     This test is intended for the qualitative detection and differentiation of SARS-CoV-2, influenza A, influenza B, and respiratory syncytial virus (RSV) viral RNA in nasopharyngeal or nares swabs from individuals suspected of respiratory viral infection consistent with COVID-19 by their healthcare provider. Signs and symptoms of respiratory viral infection due to SARS-CoV-2, influenza, and RSV can be similar.                                    Test performed using the Xpert Xpress SARS-CoV-2/FLU/RSV (real time RT-PCR)  assay on the [employer] instrument, [employer], [place] [address].                   This test is being used under the Food and Drug Administration's Emergency Use Authorization.                                    The authorized Fact Sheet for Healthcare Providers for this assay is available upon request from the laboratory.   CBC WITH DIFFERENTIAL WITH PLATELET    Narrative:     The following orders were created for panel order CBC With Differential With Platelet.                  Procedure                               Abnormality         Status                                     ---------                               -----------         ------                                     CBC W/ DIFFERENTIAL[[id number]]          Abnormal            Final result                                                  Please view results for these tests on the individual orders.   URINALYSIS, ROUTINE   LACTIC ACID REFLEX POST POSTIVE   RAINBOW DRAW LAVENDER   RAINBOW DRAW LIGHT GREEN   RAINBOW DRAW BLUE   RAINBOW DRAW GOLD     EKG    Rate, intervals and axes as noted on EKG Report.  Rate: Tachycardic, 104 bpm  Rhythm: Sinus Rhythm  Reading: Sinus tachycardia, nonspecific ST abnormality, abnormal EKG           As Interpreted by me    Imaging Results Available and Reviewed while in ED: XR CHEST AP PORTABLE  (CPT=71045)    Result Date: 5/6/2024  CONCLUSION: Low lung volumes and atelectasis.  Otherwise, no radiographic evidence of acute cardiopulmonary abnormality.    Dictated by (CST): Jay Weiss MD on 5/06/2024 at 10:24 PM     Finalized by (CST): Jay Weiss MD on 5/06/2024 at 10:25 PM           Preliminary Radiology Report  [facility], [facility]  [phone] - Phone    Health system    NAME: MARY MAGUIRE    DATE OF EXAM: 05/06/2024  Patient No:  [id number]  Physician:  GEN^ODILIA^LILIBETH  YOB: 1955    Past Medical History (entered by Technologist):    Reason For Exam (entered by Technologist):  Fever, elevated heart rate.  Other Notes (entered by Technologist): POD 4  [phone]    Additional Information (per [facility] Radiologist):      CT ABDOMEN AND PELVIS WITH CONTRAST    No prior    IMPRESSION:  Mild multifocal small and large bowel mural thickening, may be related to mild enterocolitis.  No signs of bowel obstruction.  No signs of acute appendicitis.  No obstructive urinary calculi.  No hydronephrosis.  No radiodense gallbladder stone.  No bile duct dilation.  No AAA.        Report sent at 12:57 AM ET    Manuelito Barker MD  ED Medications Administered:   Medications   acetaminophen (Tylenol Extra Strength) tab 1,000 mg (1,000 mg Oral Given 5/6/24 2106)   iopamidol 76% (ISOVUE-370) injection for power injector (80 mL Intravenous Given 5/6/24 3587)         MDM     Vitals:    05/06/24  2130 05/06/24 2200 05/06/24 2208 05/06/24 2230   BP: 122/73 111/76  116/61   Pulse: 100 101  98   Resp: 16 16  18   Temp:   98.5 °F (36.9 °C)    TempSrc:   Oral    SpO2: 96% 93%  95%   Weight:       Height:         *I personally reviewed and interpreted all ED vitals.    Pulse Ox: 95%, Room air, Normal     Monitor Interpretation:   normal sinus rhythm, sinus tachycardia as interpreted by me.  The cardiac monitor was ordered to monitor heart rate.    Differential Diagnosis/ Diagnostic Considerations: Viral syndrome, sepsis, bacterial infection, pneumonia, UTI, other    Complicating Factors: The patient already has does not have any pertinent problems on file. to contribute to the complexity of this ED evaluation.    Medical Decision Making  The patient presents to the ED with a fever and generalized symptoms of fatigue and not feeling well.  Mild cough as well.  Patient nondistressed on examination otherwise.  No abdominal tenderness, neck stiffness HEENT abnormalities or other complaints.  Workup in the ED without concerning findings other than trace lactic acid elevation.  No evidence for bacterial source of infection otherwise.  Normal laboratory testing, urinalysis, chest x-ray and CT abdomen pelvis.  Suspect viral cause of symptoms.  Patient advised on strict return precautions for any worsening of symptoms and will follow-up closely with his PCP outpatient.    Problems Addressed:  Febrile illness, acute: acute illness or injury that poses a threat to life or bodily functions    Amount and/or Complexity of Data Reviewed  Labs: ordered. Decision-making details documented in ED Course.  Radiology: ordered and independent interpretation performed. Decision-making details documented in ED Course.     Details: I personally reviewed the patient's chest x-ray image and noted no pneumonia  ECG/medicine tests: ordered and independent interpretation performed. Decision-making details documented in ED Course.    Risk  OTC  drugs.        Condition upon leaving the department: Stable    Disposition and Plan     Clinical Impression:  1. Febrile illness, acute        Disposition:  Discharge    Follow-up:  Wade Hernandez, DO  [address]  [place] [id number] [phone]    Schedule an appointment as soon as possible for a visit in 2 day(s)      [facility] Emergency Department  [address] [facility] [id number] [phone]  Follow up  If symptoms worsen      Medications Prescribed:  Current Discharge Medication List

## 2024-05-07 NOTE — ED INITIAL ASSESSMENT (HPI)
Pt ambulatory through triage c/o elevated HR. Pt states that ~ today HR went up to 100 and hasn't come back down. Hx of SVT. Pt denies chest pain or pressure. Denies SOB.  in triage.

## 2024-05-07 NOTE — TELEPHONE ENCOUNTER
Patient called stating he was in the ER on 05/06/2024. Per patient he had a CT Scan completed and there were a few spots of concern in the scan. He would like  to review the results and provide him with further direction.

## 2024-05-08 ENCOUNTER — HOSPITAL ENCOUNTER (OUTPATIENT)
Dept: ULTRASOUND IMAGING | Age: 69
Discharge: HOME | End: 2024-05-08
Attending: FAMILY MEDICINE
Payer: COMMERCIAL

## 2024-05-08 ENCOUNTER — PATIENT OUTREACH (OUTPATIENT)
Dept: CASE MANAGEMENT | Age: 69
End: 2024-05-08

## 2024-05-08 DIAGNOSIS — N50.89 TESTICULAR MASS: ICD-10-CM

## 2024-05-08 PROCEDURE — 76870 US EXAM SCROTUM: CPT | Performed by: FAMILY MEDICINE

## 2024-05-08 PROCEDURE — 93975 VASCULAR STUDY: CPT | Performed by: FAMILY MEDICINE

## 2024-05-08 NOTE — PROGRESS NOTES
1st attempt ER f/up apt request    Wade Hernandez  PCP  [address] [facility] [address]  [place] [address]  [phone]  Apt: May 9 @1:50pm     Confirmed w/ pt  Closing encounter

## 2024-05-09 ENCOUNTER — OFFICE VISIT (OUTPATIENT)
Dept: FAMILY MEDICINE CLINIC | Facility: CLINIC | Age: 69
End: 2024-05-09
Payer: COMMERCIAL

## 2024-05-09 VITALS
OXYGEN SATURATION: 98 % | HEART RATE: 86 BPM | HEIGHT: 73 IN | DIASTOLIC BLOOD PRESSURE: 85 MMHG | SYSTOLIC BLOOD PRESSURE: 126 MMHG | WEIGHT: 300 LBS | BODY MASS INDEX: 39.76 KG/M2

## 2024-05-09 DIAGNOSIS — N50.89 TESTICULAR MASS: ICD-10-CM

## 2024-05-09 DIAGNOSIS — R05.9 COUGH, UNSPECIFIED TYPE: Primary | ICD-10-CM

## 2024-05-09 PROCEDURE — 3074F SYST BP LT 130 MM HG: CPT | Performed by: FAMILY MEDICINE

## 2024-05-09 PROCEDURE — 99214 OFFICE O/P EST MOD 30 MIN: CPT | Performed by: FAMILY MEDICINE

## 2024-05-09 PROCEDURE — 3008F BODY MASS INDEX DOCD: CPT | Performed by: FAMILY MEDICINE

## 2024-05-09 PROCEDURE — 3079F DIAST BP 80-89 MM HG: CPT | Performed by: FAMILY MEDICINE

## 2024-05-09 RX ORDER — AMOXICILLIN AND CLAVULANATE POTASSIUM 875; 125 MG/1; MG/1
1 TABLET, FILM COATED ORAL 2 TIMES DAILY
Qty: 20 TABLET | Refills: 0 | Status: SHIPPED | OUTPATIENT
Start: 2024-05-09 | End: 2024-05-19

## 2024-05-09 RX ORDER — METHYLPREDNISOLONE 4 MG/1
TABLET ORAL
Qty: 1 EACH | Refills: 1 | Status: SHIPPED | OUTPATIENT
Start: 2024-05-09

## 2024-05-09 RX ORDER — CODEINE PHOSPHATE AND GUAIFENESIN 10; 100 MG/5ML; MG/5ML
10 SOLUTION ORAL EVERY 6 HOURS PRN
Qty: 236 ML | Refills: 0 | Status: SHIPPED | OUTPATIENT
Start: 2024-05-09

## 2024-05-09 NOTE — PROGRESS NOTES
Blood pressure 126/85, pulse 86, height 6' 1\" (1.854 m), weight 300 lb (136.1 kg), SpO2 98%.          1 week history of cough with dark gray phlegm.  Green nasal congestion no sore throat.  Decreased hearing in the left ear no pain no fever no chills no trouble breathing no sneezing no watery or itchy eyes.  Mild headache no facial pressure no bad breath no tooth pain.  Had diarrhea ended yesterday.  Quit smoking 40 years ago.  No asthma.  No pets.    Objective lungs clear to auscultation no rales rhonchi wheezes throat clear nonerythematous air-fluid level seen tympanic membrane on the left    Assessment serous otitis sinusitis    Plan Cheratussin AC for cough    RISKS AND BENEFITS EXPLAINED.   MEDICATION MAY CAUSE DROWSINESS.  DO NOT DRIVE OR OPERATE HEAVY MACHINERY.        Medrol Dosepak for cough Augmentin for otitis and sinusitis

## 2024-05-10 ENCOUNTER — HOSPITAL ENCOUNTER (OUTPATIENT)
Dept: CT IMAGING | Facility: HOSPITAL | Age: 69
Discharge: HOME | End: 2024-05-10
Attending: FAMILY MEDICINE
Payer: COMMERCIAL

## 2024-05-10 DIAGNOSIS — R91.8 LUNG NODULES: ICD-10-CM

## 2024-05-10 PROCEDURE — 71260 CT THORAX DX C+: CPT | Performed by: FAMILY MEDICINE

## 2024-05-12 ENCOUNTER — HOSPITAL ENCOUNTER (OUTPATIENT)
Age: 69
Discharge: HOME | End: 2024-05-12

## 2024-05-12 ENCOUNTER — APPOINTMENT (OUTPATIENT)
Dept: GENERAL RADIOLOGY | Age: 69
End: 2024-05-12
Attending: NURSE PRACTITIONER

## 2024-05-12 VITALS
DIASTOLIC BLOOD PRESSURE: 79 MMHG | OXYGEN SATURATION: 99 % | HEART RATE: 85 BPM | TEMPERATURE: 97 F | RESPIRATION RATE: 18 BRPM | SYSTOLIC BLOOD PRESSURE: 107 MMHG

## 2024-05-12 DIAGNOSIS — S90.851A FOREIGN BODY IN RIGHT FOOT, INITIAL ENCOUNTER: Primary | ICD-10-CM

## 2024-05-12 PROCEDURE — 99213 OFFICE O/P EST LOW 20 MIN: CPT

## 2024-05-12 PROCEDURE — 99214 OFFICE O/P EST MOD 30 MIN: CPT

## 2024-05-12 PROCEDURE — 73630 X-RAY EXAM OF FOOT: CPT | Performed by: NURSE PRACTITIONER

## 2024-05-12 NOTE — DISCHARGE INSTRUCTIONS
Call the podiatrist tomorrow morning and take the first available appointment.  You may wash the feet as normal take ibuprofen or Tylenol for pain you may try an ice pack for the area of discomfort.  If you develop any redness swelling or warmth fevers or chills this would be concerning for infection return to the [facility] or go to the nearest emergency department.

## 2024-05-12 NOTE — ED INITIAL ASSESSMENT (HPI)
Patient stepped on a sharp unknown object in his kitchen while barefoot about 3 weeks ago with his right foot. Patient unable to retrieve anything from the foot. Denies drainage/ fevers. Worse pain over the last 2 days. Has bandaid from home to area

## 2024-05-12 NOTE — ED PROVIDER NOTES
Patient Seen in: [facility] [place]      History     Chief Complaint   Patient presents with    Foreign Body     Stated Complaint: Foot Injury    Subjective:   HPI    This is a 68-year-old male with history of hypertension, hyperlipidemia, sleep apnea, obesity presenting with a foreign body in the foot.  Patient states about 3 weeks ago he stepped on a sharp object in the kitchen while barefoot.  Patient states that his wife did attempt to try to remove something but was not able to remove anything from the foot.  Patient states in the last 2 days he has more pain over that area has been wearing a Band-Aid over the area.  Denies any new injury or trauma.  Denies any fevers or chills.  Denies any numbness or tingling in the extremity.    Objective:   No pertinent past medical history.            No pertinent past surgical history.              No pertinent social history.            Review of Systems    Positive for stated complaint: Foot Injury  Other systems are as noted in HPI.  Constitutional and vital signs reviewed.      All other systems reviewed and negative except as noted above.    Physical Exam     ED Triage Vitals [05/12/24 0811]   /79   Pulse 85   Resp 18   Temp 97.1 °F (36.2 °C)   Temp src Temporal   SpO2 99 %   O2 Device None (Room air)       Current Vitals:   Vital Signs  BP: 107/79  Pulse: 85  Resp: 18  Temp: 97.1 °F (36.2 °C)  Temp src: Temporal    Oxygen Therapy  SpO2: 99 %  O2 Device: None (Room air)            Physical Exam  Vitals and nursing note reviewed.   Constitutional:       Appearance: Normal appearance.   HENT:      Right Ear: External ear normal.      Left Ear: External ear normal.      Nose: Nose normal.      Mouth/Throat:      Mouth: Mucous membranes are moist.      Pharynx: Oropharynx is clear.   Eyes:      Conjunctiva/sclera: Conjunctivae normal.   Musculoskeletal:         General: Normal range of motion.      Cervical back: Normal range of motion.         Feet:    Skin:     General: Skin is warm.      Capillary Refill: Capillary refill takes less than 2 seconds.   Neurological:      General: No focal deficit present.      Mental Status: He is alert and oriented to person, place, and time.               ED Course   Labs Reviewed - No data to display     XR FOOT, COMPLETE (MIN 3 VIEWS), RIGHT (CPT=73630)    Result Date: 5/12/2024  CONCLUSION: 3 x 1 mm radiodense foreign body within the plantar soft tissues at the level of the proximal phalanxes.  This is based on the lateral projection.  Very close to the plantar skin surface.  This finding is otherwise not clearly identified on the oblique or AP views limiting localization in the transverse direction.   Dictated by (CST): Delvin Prieto MD on 5/12/2024 at 8:49 AM     Finalized by (CST): Delvin Prieto MD on 5/12/2024 at 8:52 AM                       MDM                  Medical Decision Making  68-year-old male nontoxic-appearing presenting for evaluation of foreign body in the foot.  DDx foreign body versus cellulitis versus osteomyelitis.  X-ray will be ordered of the foot to evaluate for foreign body.  Discussed this with the patient.  Discussed if a foreign body is noted on the x-ray this provider will attempt to remove the foreign body but if none is noted will refer patient to podiatry to explore for foreign body.  Patient is agreeable with this plan of care.    X-ray independently viewed by this provider noted questionable foreign body    Discussed x-ray results with the patient.  Discussed given the skin is closed over will refer him to podiatry to explore and further evaluate for foreign body of the foot.  Discussed ibuprofen and Tylenol for pain. Discussed calling the podiatrist to set up an appointment take the first available appointment.  Discussed if he develops severe swelling redness or warmth fevers or chills these are signs symptoms of infection return to [facility] or go the nearest emergency department.   Patient agreeable with this plan of care and acknowledges understanding discharge instructions.      Problems Addressed:  Foreign body in right foot, initial encounter: acute illness or injury    Amount and/or Complexity of Data Reviewed  Radiology:  Decision-making details documented in ED Course.  Discussion of management or test interpretation with external provider(s): This patient was discussed with my attending Dr. Aggarwal who agrees with this provider's management and plan of care.     Risk  OTC drugs.        Disposition and Plan     Clinical Impression:  1. Foreign body in right foot, initial encounter         Disposition:  Discharge  5/12/2024  8:56 am    Follow-up:  Donte Mejia DPM  [address]  [place] [address]  [phone]    Call   Podiatrist          Medications Prescribed:  Current Discharge Medication List

## 2024-05-21 ENCOUNTER — TELEPHONE (OUTPATIENT)
Dept: FAMILY MEDICINE CLINIC | Facility: CLINIC | Age: 69
End: 2024-05-21

## 2024-05-21 DIAGNOSIS — I10 ESSENTIAL HYPERTENSION WITH GOAL BLOOD PRESSURE LESS THAN 130/80: Primary | ICD-10-CM

## 2024-05-22 ENCOUNTER — OFFICE VISIT (OUTPATIENT)
Dept: PODIATRY CLINIC | Facility: CLINIC | Age: 69
End: 2024-05-22

## 2024-05-22 ENCOUNTER — LAB ENCOUNTER (OUTPATIENT)
Dept: LAB | Facility: HOSPITAL | Age: 69
End: 2024-05-22
Attending: FAMILY MEDICINE

## 2024-05-22 DIAGNOSIS — S90.851A FOREIGN BODY IN RIGHT FOOT, INITIAL ENCOUNTER: Primary | ICD-10-CM

## 2024-05-22 DIAGNOSIS — E78.5 HYPERLIPIDEMIA, UNSPECIFIED HYPERLIPIDEMIA TYPE: ICD-10-CM

## 2024-05-22 DIAGNOSIS — I10 ESSENTIAL HYPERTENSION WITH GOAL BLOOD PRESSURE LESS THAN 130/80: ICD-10-CM

## 2024-05-22 DIAGNOSIS — M79.671 RIGHT FOOT PAIN: ICD-10-CM

## 2024-05-22 LAB
ALBUMIN SERPL-MCNC: 4.2 G/DL (ref 3.2–4.8)
ALBUMIN/GLOB SERPL: 1.6 {RATIO} (ref 1–2)
ALP LIVER SERPL-CCNC: 73 U/L
ALT SERPL-CCNC: 23 U/L
ANION GAP SERPL CALC-SCNC: 7 MMOL/L (ref 0–18)
AST SERPL-CCNC: 15 U/L (ref ?–34)
BILIRUB SERPL-MCNC: 0.6 MG/DL (ref 0.2–1.1)
BUN BLD-MCNC: 19 MG/DL (ref 9–23)
BUN/CREAT SERPL: 22.9 (ref 10–20)
CALCIUM BLD-MCNC: 9.1 MG/DL (ref 8.7–10.4)
CHLORIDE SERPL-SCNC: 108 MMOL/L (ref 98–112)
CHOLEST SERPL-MCNC: 191 MG/DL (ref ?–200)
CO2 SERPL-SCNC: 25 MMOL/L (ref 21–32)
COMPLEXED PSA SERPL-MCNC: 2.69 NG/ML (ref ?–4)
CREAT BLD-MCNC: 0.83 MG/DL
EGFRCR SERPLBLD CKD-EPI 2021: 95 ML/MIN/1.73M2 (ref 60–?)
FASTING PATIENT LIPID ANSWER: YES
FASTING STATUS PATIENT QL REPORTED: YES
GLOBULIN PLAS-MCNC: 2.7 G/DL (ref 2–3.5)
GLUCOSE BLD-MCNC: 102 MG/DL (ref 70–99)
HDLC SERPL-MCNC: 30 MG/DL (ref 40–59)
LDLC SERPL CALC-MCNC: 101 MG/DL (ref ?–100)
NONHDLC SERPL-MCNC: 161 MG/DL (ref ?–130)
OSMOLALITY SERPL CALC.SUM OF ELEC: 292 MOSM/KG (ref 275–295)
POTASSIUM SERPL-SCNC: 4 MMOL/L (ref 3.5–5.1)
PROT SERPL-MCNC: 6.9 G/DL (ref 5.7–8.2)
SODIUM SERPL-SCNC: 140 MMOL/L (ref 136–145)
TRIGL SERPL-MCNC: 353 MG/DL (ref 30–149)
TSI SER-ACNC: 1.64 MIU/ML (ref 0.55–4.78)
VLDLC SERPL CALC-MCNC: 60 MG/DL (ref 0–30)

## 2024-05-22 PROCEDURE — 36415 COLL VENOUS BLD VENIPUNCTURE: CPT

## 2024-05-22 PROCEDURE — 99203 OFFICE O/P NEW LOW 30 MIN: CPT | Performed by: PODIATRIST

## 2024-05-22 PROCEDURE — 80053 COMPREHEN METABOLIC PANEL: CPT

## 2024-05-22 PROCEDURE — 84443 ASSAY THYROID STIM HORMONE: CPT

## 2024-05-22 PROCEDURE — 80061 LIPID PANEL: CPT

## 2024-05-22 NOTE — PROGRESS NOTES
[facility]  Progress Note    Lanre Melchor is a 68 year old male.   Chief Complaint   Patient presents with    Injury     Consult- R foot foreign object- onset- 3 wks ago- stepped something in the kitchen- visit to  on 5/12/2024- and has xray in the system- rates pain 3/10 w/ WB         HPI:     This is a pleasant male with COPD, history of cardiac arrhythmia.    He presents to clinic today due to right foot foreign body.  He sates on 5/10/24 he believes he stepped on something but not sure of what it was.  He did notice bleeding.  He went to  on 5/12 and did have an xray which showed a foreign body.  He was referred to podiatry.  He is still having some pain when walking barefoot but denies any drainage.        Allergies: Lisinopril and Pravastatin   Current Outpatient Medications   Medication Sig Dispense Refill    hydrocortisone 2.5 % External Cream Apply to the affected areas on face up to twice daily Monday-Friday with flares. Take weekends off. 28 g 2    PEG 3350-KCl-Na Bicarb-NaCl (TRILYTE) 420 g Oral Recon Soln Take prep as directed by gastro office. May substitute with Trilyte/generic equivalent if needed. 4000 mL 0    benzonatate 200 MG Oral Cap Take 1 capsule (200 mg total) by mouth 3 (three) times daily as needed for cough. Take with a full glass of water and DO NOT CHEW. 30 capsule 0    telmisartan 80 MG Oral Tab Take 1 tablet (80 mg total) by mouth daily. 90 tablet 3      Past Medical History:    Arrhythmia    Svt    BCC (basal cell carcinoma)    left forearm proximal    BCC (basal cell carcinoma)    left forearm distal    High blood pressure    Obesity    Other and unspecified hyperlipidemia    Sleep apnea    not using machine    SVT (supraventricular tachycardia) (HCC)    Unspecified essential hypertension      Past Surgical History:   Procedure Laterality Date    Colonoscopy      Other surgical history  02/2017    heart ablation     Other surgical history  08/22/2023     1. Left tympanoplasty, 2. Cartilage graft, 3. Left eustachian tube balloon dilation 4. Facial nerve monitoring    Vasectomy        Family History   Problem Relation Age of Onset    Cancer Father         Brain    Skin cancer Father     Cancer Daughter         sarcoma    Melanoma Brother     Melanoma Brother       Social History     Socioeconomic History    Marital status:    Tobacco Use    Smoking status: Former     Passive exposure: Past    Smokeless tobacco: Never   Vaping Use    Vaping status: Never Used   Substance and Sexual Activity    Alcohol use: Yes     Alcohol/week: 4.0 standard drinks of alcohol     Types: 4 Standard drinks or equivalent per week     Comment: Occasionally    Drug use: No   Other Topics Concern    Grew up on a farm No    History of tanning Yes    Outdoor occupation No    Reaction to local anesthetic No    Pt has a pacemaker No    Pt has a defibrillator No           REVIEW OF SYSTEMS:   Denies nausea, fever, chills  No calf pain  No other muscle or joint aches  Denies chest pain or shortness of breath.      EXAM:   There were no vitals taken for this visit.    Constitutional:   Patient in no apparent distress. Well kept Of normal body habitus. Alert and oriented to person, place, and time.  Integumentary examination:   There are no varicosities.   Skin appears moist, warm, and supple with positive hair growth.     Right sub 3rd met head with focal callus and underlying foreign body with no drainage and no SOI    Vascular examination:   DP pulse is 2/4  PT pulse is 2/4  Capillary refill is immediate  Edema is not present bilateral.  Temperature warm proximally to warm distally bilateral.  Neurological examination:   Vibratory (128-Hz tuning fork) sensation is present to right and is present to left.  Sharp/dull is present to right and is present to left.  Musculoskeletal examination:  Muscle Strength is 5/5.    POP to right sub 3rd met head    LABS & IMAGING:     Lab Results    Component Value Date     (H) 05/22/2024    BUN 19 05/22/2024    CREATSERUM 0.83 05/22/2024    BUNCREA 22.9 (H) 05/22/2024    ANIONGAP 7 05/22/2024    GFRAA 102 10/23/2020    GFRNAA 88 10/23/2020    CA 9.1 05/22/2024     05/22/2024    K 4.0 05/22/2024     05/22/2024    CO2 25.0 05/22/2024    OSMOCALC 292 05/22/2024        No results found for: \"EAG\", \"A1C\"     No results found.     ASSESSMENT AND PLAN:   Diagnoses and all orders for this visit:    Foreign body in right foot, initial encounter    Right foot pain        Plan:     Evaluated the patient and discussed treatment options.  Reviewed the patients x-rays with the patient.    Xray Right foot: 5/12/24  CONCLUSION: 3 x 1 mm radiodense foreign body within the plantar soft tissues at the level of the proximal phalanxes.  This is based on the lateral projection.       Debrided Right sub 3rd met head callus using a #15 blade which did reveal a small piece of glass intra dermally which was removed in toto.    Careful palpation revealed no remaining glass on the right plantar foot.   No open wound noted and no SOI.    Pt noticed immediate relief after the piece of glass was removed.      RTC PRN    No follow-ups on file.    Donte Mejia, JHONA  5/22/2024

## 2024-05-23 ENCOUNTER — TELEPHONE (OUTPATIENT)
Dept: FAMILY MEDICINE CLINIC | Facility: CLINIC | Age: 69
End: 2024-05-23

## 2024-05-23 DIAGNOSIS — Q24.5 CORONARY ARTERY ABNORMALITY (HCC): Primary | ICD-10-CM

## 2024-05-23 DIAGNOSIS — Z78.9 STATIN INTOLERANCE: ICD-10-CM

## 2024-05-23 NOTE — TELEPHONE ENCOUNTER
Evolocumab with Infusor (REPATHA PUSHTRONEX SYSTEM) 420 MG/3.5ML Subcutaneous Solution Cartridge, Inject 1 Application into the skin every 30 (thirty) days., Disp: 3.6 mL, Rfl: 3      Plan preferred alternatives: Rosuvastatin or call plan at [phone] to initiate prior authorization. Patients ID number is [id number]. To provide new prescription, call store at [phone]

## 2024-05-23 NOTE — TELEPHONE ENCOUNTER
Current Outpatient Medications:     Evolocumab with Infusor (REPATHA PUSHTRONEX SYSTEM) 420 MG/3.5ML Subcutaneous Solution Cartridge, Inject 1 Application into the skin every 30 (thirty) days., Disp: 3.6 mL, Rfl: 3        Plan preferred alternative(s): rosuvastatin or call plan at [phone] to initiate prior authorization. Patients ID number is [id number]. To provide new prescription call store at [phone]

## 2024-05-24 NOTE — TELEPHONE ENCOUNTER
Prior authorization for REPATHA PUSHTRONEX SYSTEM) 420 MG/3.5ML has been denied, please see below and advise.

## 2024-05-24 NOTE — TELEPHONE ENCOUNTER
Will await authorization for Repatha  
----- Message from Wade Hernandez sent at 5/23/2024  1:17 PM CDT -----  Cholesterol elevated patient does not tolerate statins so ordered sent to pharmacy to obtain prior authorization for Repatha  
Duplicate   
Noted.   
Please assist w/ auth for repatha.     Note below.   
Spoke w. Pt verifying  and last name    Rely message regarding results    Pt states if he's is able to repeat labs again, pt states he has been not eating well and not healthy.     Also stated  has sent repetha to pharmacy.       Please advise.   
Regular Diet - No restrictions

## 2024-05-28 NOTE — TELEPHONE ENCOUNTER
Patient's wife, Coral, calling to follow up on prior authorization status. Informed that it needs further review per Dr. Hernandez  And she will be contacted once additional information has been received. Coral verbalized understanding.

## 2024-05-30 NOTE — TELEPHONE ENCOUNTER
Call from Pinky,  with [facility], asking if Dr Hernandez filled out the additional information required for prior authorization of Repatha?  Please advise?

## 2024-05-30 NOTE — TELEPHONE ENCOUNTER
Called patient to inform of Repatha denial.  Informed that insurance suggest medication be prescribed by a specialist - Cardiologist.    Informed of cardiology referral in chart.  Will attach copy to his [employer] to call & schedule an appointment.

## 2024-05-30 NOTE — TELEPHONE ENCOUNTER
See above.  Patient intolerant to statin and the CT finding will refer  to cardiology referral entered.

## 2024-05-30 NOTE — TELEPHONE ENCOUNTER
Repatha DENIED- please advise next steps    Plan preferred alternative(s): rosuvastatin or call plan at [phone] to initiate prior authorization. Patients ID number is [id number]. To provide new prescription call store at [phone]       Called [facility] spoke to Emi to inform of Repatha denial on 5/24/24. Specialist should prescribe    Maybe a manufacture program to assist with cost, patient would have to enroll [employer] coupon $582.24

## 2024-06-10 DIAGNOSIS — Z00.00 ANNUAL PHYSICAL EXAM: ICD-10-CM

## 2024-06-13 RX ORDER — TELMISARTAN 80 MG/1
80 TABLET ORAL DAILY
Qty: 90 TABLET | Refills: 3 | Status: SHIPPED | OUTPATIENT
Start: 2024-06-13

## 2024-06-13 NOTE — TELEPHONE ENCOUNTER
REFILL PASSED PER [facility] PROTOCOLS    Requested Prescriptions   Pending Prescriptions Disp Refills    telmisartan 80 MG Oral Tab 90 tablet 3     Sig: Take 1 tablet (80 mg total) by mouth daily.       Hypertension Medications Protocol Passed - 6/10/2024  9:26 AM        Passed - CMP or BMP in past 12 months        Passed - Last BP reading less than 140/90     BP Readings from Last 1 Encounters:   05/12/24 107/79               Passed - In person appointment or virtual visit in the past 12 mos or appointment in next 3 mos     Recent Outpatient Visits              3 weeks ago Foreign body in right foot, initial encounter    [facility] [address], [place] Donte Mejia DPM    Office Visit    1 month ago [facility], [facility]    [facility] Visit    1 month ago Basal cell carcinoma of skin of trunk, except scrotum    [facility]    [facility]    2 months ago [facility] benign nevi    [facility]    Office Visit    3 months ago Screen for colon cancer    [facility] [place]    Nurse Only          Future Appointments         Provider Department Appt Notes    In 1 month EFRAÍN, PROCEDURE [facility] [place] Colon [email or web address]    In 2 months [facility] 2 [facility] Ultrasound Check spot against base line                    Passed - EGFRCR or GFRNAA > 50     GFR Evaluation  EGFRCR: 95 , resulted on 5/22/2024               Future Appointments         Provider Department Appt Notes    In 1 month EFRAÍN, PROCEDURE [facility] [place] Colon [email or web address]    In 2 months [facility] 2 [facility] Ultrasound Check spot against base line          Recent Outpatient Visits              3 weeks ago  [facility] in right foot, [facility]    Office Visit    1 month ago [facility]ough, unspecified type    [facility]    Office Visit    1 month ago Basal cell carcinoma of skin of trunk, except scrotum    [facility]    Office Visit    2 months ago Multiple benign nevi    [facility]as Mendiola MD    Office Visit    3 months ago [facility]    [facility]

## 2024-06-26 ENCOUNTER — ORDER TRANSCRIPTION (OUTPATIENT)
Dept: ADMINISTRATIVE | Facility: HOSPITAL | Age: 69
End: 2024-06-26

## 2024-06-26 DIAGNOSIS — R06.09 DOE (DYSPNEA ON EXERTION): Primary | ICD-10-CM

## 2024-06-26 DIAGNOSIS — R94.31 ABNORMAL EKG: ICD-10-CM

## 2024-07-29 ENCOUNTER — ANESTHESIA EVENT (OUTPATIENT)
Dept: ENDOSCOPY | Facility: HOSPITAL | Age: 69
End: 2024-07-29
Payer: COMMERCIAL

## 2024-07-29 ENCOUNTER — HOSPITAL ENCOUNTER (OUTPATIENT)
Facility: HOSPITAL | Age: 69
Setting detail: HOSPITAL OUTPATIENT SURGERY
Discharge: HOME | End: 2024-07-29
Attending: INTERNAL MEDICINE | Admitting: INTERNAL MEDICINE
Payer: COMMERCIAL

## 2024-07-29 ENCOUNTER — ANESTHESIA (OUTPATIENT)
Dept: ENDOSCOPY | Facility: HOSPITAL | Age: 69
End: 2024-07-29
Payer: COMMERCIAL

## 2024-07-29 DIAGNOSIS — Z12.11 SCREEN FOR COLON CANCER: ICD-10-CM

## 2024-07-29 PROCEDURE — 0DBL8ZX EXCISION OF TRANSVERSE COLON, VIA NATURAL OR ARTIFICIAL OPENING ENDOSCOPIC, DIAGNOSTIC: ICD-10-PCS | Performed by: INTERNAL MEDICINE

## 2024-07-29 PROCEDURE — 0DBK8ZX EXCISION OF ASCENDING COLON, VIA NATURAL OR ARTIFICIAL OPENING ENDOSCOPIC, DIAGNOSTIC: ICD-10-PCS | Performed by: INTERNAL MEDICINE

## 2024-07-29 PROCEDURE — 0DBH8ZX EXCISION OF CECUM, VIA NATURAL OR ARTIFICIAL OPENING ENDOSCOPIC, DIAGNOSTIC: ICD-10-PCS | Performed by: INTERNAL MEDICINE

## 2024-07-29 PROCEDURE — 45385 COLONOSCOPY W/LESION REMOVAL: CPT | Performed by: INTERNAL MEDICINE

## 2024-07-29 RX ORDER — SODIUM CHLORIDE, SODIUM LACTATE, POTASSIUM CHLORIDE, CALCIUM CHLORIDE 600; 310; 30; 20 MG/100ML; MG/100ML; MG/100ML; MG/100ML
INJECTION, SOLUTION INTRAVENOUS CONTINUOUS
Status: DISCONTINUED | OUTPATIENT
Start: 2024-07-29 | End: 2024-07-29

## 2024-07-29 RX ORDER — LIDOCAINE HYDROCHLORIDE 10 MG/ML
INJECTION, SOLUTION EPIDURAL; INFILTRATION; INTRACAUDAL; PERINEURAL AS NEEDED
Status: DISCONTINUED | OUTPATIENT
Start: 2024-07-29 | End: 2024-07-29 | Stop reason: SURG

## 2024-07-29 RX ADMIN — SODIUM CHLORIDE, SODIUM LACTATE, POTASSIUM CHLORIDE, CALCIUM CHLORIDE: 600; 310; 30; 20 INJECTION, SOLUTION INTRAVENOUS at 08:38:00

## 2024-07-29 RX ADMIN — LIDOCAINE HYDROCHLORIDE 50 MG: 10 INJECTION, SOLUTION EPIDURAL; INFILTRATION; INTRACAUDAL; PERINEURAL at 08:38:00

## 2024-07-29 NOTE — H&P
Pre Procedure History & Physical Examination    Patient Name: Lanre Melchor  MRN: [id number]  CSN: [id number]  YOB: 1955    Diagnosis: screening colonoscopy, prior >15 years ago    Medications Prior to Admission   Medication Sig Dispense Refill Last Dose    telmisartan 80 MG Oral Tab Take 1 tablet (80 mg total) by mouth daily. 90 tablet 3 2024 at 8pm    [] Evolocumab with Infusor (REPATHA PUSHTRONEX SYSTEM) 420 MG/3.5ML Subcutaneous Solution Cartridge Inject 1 Application into the skin every 30 (thirty) days. 3.6 mL 3     [] amoxicillin clavulanate 875-125 MG Oral Tab Take 1 tablet by mouth 2 (two) times daily for 10 days. 20 tablet 0     hydrocortisone 2.5 % External Cream Apply to the affected areas on face up to twice daily Monday-Friday with flares. Take weekends off. 28 g 2     PEG 3350-KCl-Na Bicarb-NaCl (TRILYTE) 420 g Oral Recon Soln Take prep as directed by gastro office. May substitute with Trilyte/generic equivalent if needed. 4000 mL 0     benzonatate 200 MG Oral Cap Take 1 capsule (200 mg total) by mouth 3 (three) times daily as needed for cough. Take with a full glass of water and DO NOT CHEW. 30 capsule 0      Current Facility-Administered Medications   Medication Dose Route Frequency    lactated ringers infusion   Intravenous Continuous       Allergies:   Allergies   Allergen Reactions    Lisinopril ANAPHYLAXIS    Pravastatin PAIN     Severe joint pain.         Past Medical History:    Arrhythmia    Svt    BCC (basal cell carcinoma)    left forearm proximal    BCC (basal cell carcinoma)    left forearm distal    High blood pressure    High cholesterol    Obesity    Other and unspecified hyperlipidemia    Sleep apnea    not using machine    SVT (supraventricular tachycardia) (HCC)    Unspecified essential hypertension     Past Surgical History:   Procedure Laterality Date    Colonoscopy      Other surgical history  2017    heart ablation     Other surgical  history  08/22/2023    1. Left tympanoplasty, 2. Cartilage graft, 3. Left eustachian tube balloon dilation 4. Facial nerve monitoring    Vasectomy       Family History   Problem Relation Age of Onset    Cancer Father         Brain    Skin cancer Father     Cancer Daughter         sarcoma    Melanoma Brother     Melanoma Brother      Social History     Tobacco Use    Smoking status: Former     Passive exposure: Past    Smokeless tobacco: Never   Substance Use Topics    Alcohol use: Yes     Alcohol/week: 4.0 standard drinks of alcohol     Types: 4 Standard drinks or equivalent per week     Comment: Occasionally         ROS:   CONSTITUTIONAL:  negative for fevers, rigors  EYES:  negative for diplopia   RESPIRATORY:  negative for severe shortness of breath  CARDIOVASCULAR:  negative for crushing sub-sternal chest pain  GASTROINTESTINAL:  see HPI  GENITOURINARY:  negative for dysuria or gross hematuria  INTEGUMENT/BREAST:  SKIN:  negative for jaundice   ALLERGIC/IMMUNOLOGIC:  negative for hay fever  ENDOCRINE:  negative for cold intolerance and heat intolerance  MUSCULOSKELETAL:  negative for joint effusion/severe erythema  BEHAVIOR/PSYCH:  negative for psychotic behavior      PHYSICAL EXAM:   /81 (BP Location: Left arm)   Pulse 63   Temp 98 °F (36.7 °C) (Temporal)   Resp 18   Ht 6' 1\" (1.854 m)   Wt 297 lb (134.7 kg)   SpO2 96%   BMI 39.18 kg/m²       Gen: Patient appears comfortable and in no acute discomfort  HEENT: the sclera appears anicteric, oropharynx clear, mucus membranes appear moist  CV: regular rate and rhythm, the extremities are warm and well perfused   Lung: Moves air well; No labored breathing  Abdomen: soft, non-tender exam in all quadrants without rigidity or guarding, non-distended, no abnormal bowel sounds noted, no masses are palpated  Skin: No jaundice  Ext: no cyanosis, clubbing or edema is evident.   Neuro: Alert and interactive, and gross movements of extremities normal    I have  discussed the risks and benefits and alternatives of the procedure with the patient/family.  They understand and agree to proceed with plan of care.   I have reviewed recent H&P/clinic notes  Kristan Tovar MD  [facility] - Gastroenterology  7/29/2024  8:30 AM

## 2024-07-29 NOTE — PRE-SEDATION ASSESSMENT
Physician Pre-Sedation Assessment    Pre-Sedation Assessment:    Sedation History: Previous Sedation with No Complications and Airway Assessed    Cardiac: normal S1, S2  Respiratory: breath sounds clear bilaterally   Abdomen: soft, BS (+), non-tender    ASA Classification: 3. Patient with severe systemic disease    Plan: MAC sedation

## 2024-07-29 NOTE — ANESTHESIA POSTPROCEDURE EVALUATION
Patient: Lanre Melchor    Procedure Summary       Date: 07/29/24 Room / Location: [facility] ENDOSCOPY 04 / [facility] ENDOSCOPY    Anesthesia Start: 0836 Anesthesia Stop:     Procedure: COLONOSCOPY Diagnosis:       Screen for colon cancer      (Polyps, Diverticulosis, Hemorrhoids)    Surgeons: Kristan Tovar MD Anesthesiologist: Judi Dsouza CRNA    Anesthesia Type: general, MAC ASA Status: 3            Anesthesia Type: general, MAC    Vitals Value Taken Time   /76 07/29/24 0911   Temp 98.6 07/29/24 0911   Pulse 70 07/29/24 0911   Resp 31 07/29/24 0911   SpO2 98 07/29/24 0911   Vitals shown include unfiled device data.    [facility] AN Post Evaluation:   Patient Evaluated in PACU  Patient Participation: complete - patient participated  Level of Consciousness: awake and alert  Pain Score: 0  Pain Management: adequate  Airway Patency:patent  Dental exam unchanged from preop  Yes    Nausea/Vomiting: none  Cardiovascular Status: acceptable  Respiratory Status: acceptable  Postoperative Hydration acceptable      JUDI DSOUZA CRNA  7/29/2024 9:11 AM

## 2024-07-29 NOTE — OPERATIVE REPORT
COLONOSCOPY REPORT    Lanre Melchor     1955 Age 69 year old   PCP Wade Hernandez DO Endoscopist Kristan Tovar MD     Date of procedure: 24    Procedure: Colonoscopy w/cold biopsy and cold snare polypectomy    Pre-operative diagnosis: screening colonoscopy    Post-operative diagnosis: polyps, diverticulosis and hemorrhoids     Medications: MAC sedation    Withdrawal time: 22 minutes    Procedure:  Informed consent was obtained from the patient after the risks of the procedure were discussed, including but not limited to bleeding, perforation, aspiration, infection, or possibility of a missed lesion. After discussions of the risks/benefits and alternatives to this procedure, as well as the planned sedation, the patient was placed in the left lateral decubitus position and begun on continuous blood pressure pulse oximetry and EKG monitoring and this was maintained throughout the procedure. Once an adequate level of sedation was obtained a digital rectal exam was completed. Then the lubricated tip of the [id number] diagnostic video colonoscope was inserted and advanced without difficulty to the cecum using the CO2 insufflation technique. The cecum was identified by localizing the trifold, the appendix and the ileocecal valve. Withdrawal was begun with thorough washing and careful examination of the colonic walls and folds. A routine second examination of the cecum/ascending colon was performed. Photodocumentation was obtained. The bowel prep was good. Views of the colon were good with washing. I then carefully withdrew the instrument from the patient who tolerated the procedure well.     Complications: none.    Findings:   1. 6 polyp(s) noted as follows:      A. 6 mm polyp in the cecum; flat morphology; cold snare polypectomy and retrieved.      B. 4-5 mm polyps x2 in the ascending colon; flat morphology; cold biopsy and cold biopsy polypectomy and retrieved.      C. 3-6 mm polyps x3 in  the transverse colon; flat morphology; larger 6 mm polyp removed with cold snare and smaller polyps with cold biopsy polypectomy and retrieved.    2. Diverticulosis: left sided.    3. Terminal ileum: the visualized mucosa appeared normal.    4. A retroflexed view of the rectum revealed hemorrhoids.    5. The colonic mucosa throughout the colon showed normal vascular pattern, without evidence of angioectasias or inflammation.     6. NAY: normal rectal tone, no masses palpated.     Recommend:  Await pathology, likely repeat colonoscopy in 3-5 years. The interval for the next colonoscopy will be determined after reviewing pathology. If new signs or symptoms develop, colonoscopy may need to be repeated sooner.   High fiber diet.  Monitor for blood in the stool. If having more than just tinge of blood, call office or go to the ER.    >>>If tissue was obtained and you have not received your pathology results either by phone or letter within 2 weeks, please call our office at [phone].    Specimens: cecal polyps, ascending polyps, transverse polyp

## 2024-07-29 NOTE — ANESTHESIA PREPROCEDURE EVALUATION
Anesthesia PreOp Note    HPI:     Lanre Melchor is a 69 year old male who presents for preoperative consultation requested by: Kristan Tovar MD    Date of Surgery: 7/29/2024    Procedure(s):  COLONOSCOPY  Indication: Screen for colon cancer    Relevant Problems   No relevant active problems       NPO:  Last Liquid Consumption Date: 07/29/24  Last Liquid Consumption Time: 0400  Last Solid Consumption Date: 07/28/24  Last Solid Consumption Time: 0800  Last Liquid Consumption Date: 07/29/24          History Review:  Patient Active Problem List    Diagnosis Date Noted    Tympanic membrane central perforation, 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    Mixed conductive and sensorineural hearing loss of left ear with restricted hearing of right ear 08/22/2023    ETD (Eustachian tube dysfunction), 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    Chronic obstructive pulmonary disease, unspecified (HCC) 04/14/2023    Degenerative disc disease, cervical 06/16/2022    History of cardiac arrhythmia 02/10/2020    Diverticulosis 06/13/2019    High cholesterol 06/13/2019    Cough 02/27/2018    Chronic GERD 02/27/2018    Essential hypertension with goal blood pressure less than 130/80 08/31/2017    TANYA (obstructive sleep apnea) 08/31/2017    Acute gout involving toe of left foot 08/31/2017    Hypokalemia 12/23/2016    Elevated troponin 12/23/2016    Arthralgia of left hip 01/22/2016       Past Medical History:    Arrhythmia    Svt    BCC (basal cell carcinoma)    left forearm proximal    BCC (basal cell carcinoma)    left forearm distal    High blood pressure    High cholesterol    Obesity    Other and unspecified hyperlipidemia    Sleep apnea    not using machine    SVT (supraventricular tachycardia) (HCC)    Unspecified essential hypertension       Past Surgical History:   Procedure Laterality Date    Colonoscopy      Other surgical history  02/2017    heart ablation     Other surgical history  08/22/2023    1. Left tympanoplasty, 2. Cartilage graft, 3. Left eustachian  tube balloon dilation 4. Facial nerve monitoring    Vasectomy         Medications Prior to Admission   Medication Sig Dispense Refill Last Dose    telmisartan 80 MG Oral Tab Take 1 tablet (80 mg total) by mouth daily. 90 tablet 3 2024 at 8pm    [] Evolocumab with Infusor (REPATHA PUSHTRONEX SYSTEM) 420 MG/3.5ML Subcutaneous Solution Cartridge Inject 1 Application into the skin every 30 (thirty) days. 3.6 mL 3     [] amoxicillin clavulanate 875-125 MG Oral Tab Take 1 tablet by mouth 2 (two) times daily for 10 days. 20 tablet 0     hydrocortisone 2.5 % External Cream Apply to the affected areas on face up to twice daily Monday-Friday with flares. Take weekends off. 28 g 2     PEG 3350-KCl-Na Bicarb-NaCl (TRILYTE) 420 g Oral Recon Soln Take prep as directed by gastro office. May substitute with Trilyte/generic equivalent if needed. 4000 mL 0     benzonatate 200 MG Oral Cap Take 1 capsule (200 mg total) by mouth 3 (three) times daily as needed for cough. Take with a full glass of water and DO NOT CHEW. 30 capsule 0      Current Facility-Administered Medications Ordered in Epic   Medication Dose Route Frequency Provider Last Rate Last Admin    lactated ringers infusion   Intravenous Continuous Kristan Tovar MD         No current [facility]-ordered outpatient medications on file.       Allergies   Allergen Reactions    Lisinopril ANAPHYLAXIS    Pravastatin PAIN     Severe joint pain.         Family History   Problem Relation Age of Onset    Cancer Father         Brain    Skin cancer Father     Cancer Daughter         sarcoma    Melanoma Brother     Melanoma Brother      Social History     Socioeconomic History    Marital status:    Tobacco Use    Smoking status: Former     Passive exposure: Past    Smokeless tobacco: Never   Vaping Use    Vaping status: Never Used   Substance and Sexual Activity    Alcohol use: Yes     Alcohol/week: 4.0 standard drinks of alcohol     Types: 4 Standard drinks or  equivalent per week     Comment: Occasionally    Drug use: Yes     Types: Cannabis     Comment: gummies to help sleep   Other Topics Concern    Grew up on a farm No    History of tanning Yes    Outdoor occupation No    Reaction to local anesthetic No    Pt has a pacemaker No    Pt has a defibrillator No       Available pre-op labs reviewed.  Lab Results   Component Value Date    WBC 6.8 05/06/2024    RBC 4.88 05/06/2024    HGB 15.0 05/06/2024    HCT 43.0 05/06/2024    MCV 88.1 05/06/2024    MCH 30.7 05/06/2024    MCHC 34.9 05/06/2024    RDW 13.2 05/06/2024    .0 05/06/2024     Lab Results   Component Value Date     05/22/2024    K 4.0 05/22/2024     05/22/2024    CO2 25.0 05/22/2024    BUN 19 05/22/2024    CREATSERUM 0.83 05/22/2024     (H) 05/22/2024    CA 9.1 05/22/2024          Vital Signs:  Body mass index is 39.18 kg/m².   height is 1.854 m (6' 1\") and weight is 134.7 kg (297 lb). His temporal temperature is 98 °F (36.7 °C). His blood pressure is 130/81 and his pulse is 63. His respiration is 18 and oxygen saturation is 96%.   Vitals:    07/23/24 1524 07/29/24 0807   BP:  130/81   Pulse:  63   Resp:  18   Temp:  98 °F (36.7 °C)   TempSrc:  Temporal   SpO2:  96%   Weight: 134.7 kg (297 lb)    Height: 1.854 m (6' 1\")         Anesthesia Evaluation     Patient summary reviewed and Nursing notes reviewed    Airway   Mallampati: II  Dental - Dentition appears grossly intact     Pulmonary - normal exam   (+) COPD, sleep apnea on CPAP  Cardiovascular - normal exam  (+) hypertension, dysrhythmias    ROS comment: VT  History of ablation 2017 no clinical recurrences in that level of tachycardiaTachycardia  Having episodes of heart rate above 100 and has Apple watch with walking that 1 expect with dyspnea on exertion  Had sinus tachycardia in the emergency room during evaluation with ST-T wave changesDyspnea on exertion  Evaluate for ischemic equivalent given abnormal EKG given his arthritis  unable to do treadmill will get Ginny PET stress to evaluate for ischemia  Echocardiogram for LV function atrial sizeObstructive sleep apneaHypertension  On telmisartan normally controlledDyslipidemia  Taking natural therapies does not tolerate statin LDL 118Plan  1 week MCT  Echocardiogram  Ginny PET stress  Follow-up after testing     6/6/2024  Stress test ef 56%  Cleared for colonoscopy      Neuro/Psych      GI/Hepatic/Renal    (+) GERD, bowel prep    Endo/Other    Abdominal   (+) obese                 Anesthesia Plan:   ASA:  3  Plan:   General and MAC  Informed Consent Plan and Risks Discussed With:  Patient  Discussed plan with:  CRNA and surgeon      I have informed Lanre Melchor and/or legal guardian or family member of the nature of the anesthetic plan, benefits, risks including possible dental damage if relevant, major complications, and any alternative forms of anesthetic management.   All of the patient's questions were answered to the best of my ability. The patient desires the anesthetic management as planned.  SHAWN PALACIOS CRNA  7/29/2024 8:11 AM  Present on Admission:  **None**

## 2024-07-29 NOTE — DISCHARGE INSTRUCTIONS
Home Care Instructions for Colonoscopy and/or Gastroscopy with Sedation    Diet:  - Resume your regular diet as tolerated unless otherwise instructed.  - Start with light meals to minimize bloating.  - Do not drink alcohol today.    Medication:  - If you have questions about resuming your normal medications, please contact your Primary Care Physician.    Activities:  - Take it easy today. Do not return to work today.  - Do not drive today.  - Do not operate any machinery today (including kitchen equipment).    Colonoscopy:  - You may notice some rectal \"spotting\" (a little blood on the toilet tissue) for a day or two after the exam. This is normal.  - If you experience any rectal bleeding (not spotting), persistent tenderness or sharp severe abdominal pains, oral temperature over 100 degrees Fahrenheit, light-headedness or dizziness, or any other problems, contact your doctor.    Gastroscopy:  - You may have a sore throat for 2-3 days following the exam. This is normal. Gargling with warm salt water (1/2 tsp salt to 1 glass warm water) or using throat lozenges will help.  - If you experience any sharp pain in your neck, abdomen or chest, vomiting of blood, oral temperature over 100 degrees Fahrenheit, light-headedness or dizziness, or any other problems, contact your doctor.    **If unable to reach your doctor, please go to the [facility]**    - Your referring physician will receive a full report of your examination.  - If you do not hear from your doctor's office within two weeks of your biopsy, please call them for your results.    You may be able to see your laboratory results in [employer] between 4 and 7 business days.  In some cases, your physician may not have viewed the results before they are released to [employer].  If you have questions regarding your results contact the physician who ordered the test/exam by phone or via [employer] by choosing \"Ask a Medical Question.\"

## 2024-07-30 VITALS
HEART RATE: 53 BPM | DIASTOLIC BLOOD PRESSURE: 85 MMHG | HEIGHT: 73 IN | TEMPERATURE: 98 F | BODY MASS INDEX: 39.36 KG/M2 | RESPIRATION RATE: 13 BRPM | SYSTOLIC BLOOD PRESSURE: 125 MMHG | OXYGEN SATURATION: 97 % | WEIGHT: 297 LBS

## 2024-07-31 ENCOUNTER — TELEPHONE (OUTPATIENT)
Facility: CLINIC | Age: 69
End: 2024-07-31

## 2024-07-31 NOTE — TELEPHONE ENCOUNTER
3  year colonoscopy recall entered. Health maintenance updated.    Colonoscopy done on 7/29/24 and next due on 7/29/27.

## 2024-07-31 NOTE — TELEPHONE ENCOUNTER
----- Message from Kristan Tovar sent at 7/31/2024  7:48 AM CDT -----  GI staff: please place recall for colonoscopy in 3 years

## 2024-08-08 ENCOUNTER — HOSPITAL ENCOUNTER (OUTPATIENT)
Dept: CT IMAGING | Facility: HOSPITAL | Age: 69
Discharge: HOME | End: 2024-08-08
Attending: INTERNAL MEDICINE
Payer: COMMERCIAL

## 2024-08-08 ENCOUNTER — NURSE TRIAGE (OUTPATIENT)
Dept: FAMILY MEDICINE CLINIC | Facility: CLINIC | Age: 69
End: 2024-08-08

## 2024-08-08 VITALS
WEIGHT: 298 LBS | HEART RATE: 55 BPM | BODY MASS INDEX: 39.49 KG/M2 | HEIGHT: 73 IN | RESPIRATION RATE: 11 BRPM | DIASTOLIC BLOOD PRESSURE: 86 MMHG | SYSTOLIC BLOOD PRESSURE: 147 MMHG

## 2024-08-08 DIAGNOSIS — R06.09 DOE (DYSPNEA ON EXERTION): ICD-10-CM

## 2024-08-08 DIAGNOSIS — R94.31 ABNORMAL EKG: ICD-10-CM

## 2024-08-08 LAB
CREAT BLD-MCNC: 0.8 MG/DL
EGFRCR SERPLBLD CKD-EPI 2021: 96 ML/MIN/1.73M2 (ref 60–?)

## 2024-08-08 PROCEDURE — 82565 ASSAY OF CREATININE: CPT

## 2024-08-08 PROCEDURE — 75574 CT ANGIO HRT W/3D IMAGE: CPT | Performed by: INTERNAL MEDICINE

## 2024-08-08 RX ORDER — METOPROLOL TARTRATE 1 MG/ML
5 INJECTION, SOLUTION INTRAVENOUS SEE ADMIN INSTRUCTIONS
Status: DISCONTINUED | OUTPATIENT
Start: 2024-08-08 | End: 2024-08-10

## 2024-08-08 RX ORDER — NITROGLYCERIN 0.4 MG/1
0.4 TABLET SUBLINGUAL ONCE
Status: COMPLETED | OUTPATIENT
Start: 2024-08-08 | End: 2024-08-08

## 2024-08-08 RX ORDER — METOPROLOL TARTRATE 1 MG/ML
INJECTION, SOLUTION INTRAVENOUS
Status: COMPLETED
Start: 2024-08-08 | End: 2024-08-08

## 2024-08-08 RX ORDER — DILTIAZEM HYDROCHLORIDE 5 MG/ML
5 INJECTION INTRAVENOUS SEE ADMIN INSTRUCTIONS
Status: DISCONTINUED | OUTPATIENT
Start: 2024-08-08 | End: 2024-08-10

## 2024-08-08 RX ADMIN — NITROGLYCERIN 0.4 MG: 0.4 TABLET SUBLINGUAL at 08:35:00

## 2024-08-08 RX ADMIN — METOPROLOL TARTRATE 5 MG: 1 INJECTION, SOLUTION INTRAVENOUS at 08:46:00

## 2024-08-08 NOTE — IMAGING NOTE
TO RAD HOLDING AT 0752     HX TAKEN: PT STATES TEST RESULTS WILL DETERMINE IF HE NEEDS TO REMAIN ON STATIN.     PT CONSENTED AT 0802     BASELINE VITAL SIGNS: HR 56  /69, BMI 39.3. OCCASIONAL PVC'S NOTED     CTA ORDERED BY LAWRENCE BLUE MD; WAS PT GIVEN CTA PREMEDS: NO    18 GAUGE IV STARTED AT 0818,  POC TESTING COMPLETED GFR = 96   CREATINE = 0.8    TO CT TABLE @ 0834    CONNECT TO MONITOR,  HR 56 /69 AT 0854      NITROGLYCERIN 0.4 MILLIGRAMS SUBLINGUAL GIVEN AT 0835     CALCIUM SCORE COMPLETED AT 0838, HR UP TO MID 60'S., OCCAS PVC'S     CHECKED ON PT, DENIES DISCOMFORT OR ANXIETY, ENCOURAGED RELAXATION BREATHING & BREATH HOLDS     VS HR 66 HAWK 124/76 AT 0845, METOPROLOL 5 MILLIGRAMS GIVEN IV PUSH,  SEE  PROTOCOL    INJECTION STARTED AT 0852, HR 53 DURING SCAN, PROCEDURE COMPLETE    POST SCAN HR 55 /78 AT 0854; PT FEELS WELL    PT TO HOLDING AREA,  VS /78 BP 50 AT 0900     AVS  PROVIDED      VS HR 50 /71  AT 0914    0921 DISCHARGED HOME

## 2024-08-08 NOTE — TELEPHONE ENCOUNTER
Action Requested: Summary for Provider     []  Critical Lab, Recommendations Needed  [] Need Additional Advice  []   FYI    []   Need Orders  [] Need Medications Sent to Pharmacy  []  Other     SUMMARY: Per protocol advised : Office visit   Future Appointments   Date Time Provider Department Center   8/9/2024  1:50 PM [facility]   8/26/2024 12:00 PM [facility] [place]     Reason for call: Knee Pain  Onset: Data Unavailable    Patient calling ( name and date of birth of patient verified ) in regards to MyChart     Reports right knee pain has increased over the past year and walking down the stairs increases the pain , hearing crepitus       Uses Aleve with slight relief , pain increasing on a daily basis     Lanre Remy Melchor   to P Em Rn Triage (supporting Wade Hernandez DO)       8/7/24 10:49 AM  I am having problems with my knee that I had scoped about 12-15 years ago. Very problematic walking down steps. I’m guessing I need to go see an orthopedic? What would be next steps?    Reason for Disposition   MODERATE pain (e.g., symptoms interfere with work or school, limping) and present > 3 days    Protocols used: Knee Pain-A-[place]

## 2024-08-09 ENCOUNTER — OFFICE VISIT (OUTPATIENT)
Dept: FAMILY MEDICINE CLINIC | Facility: CLINIC | Age: 69
End: 2024-08-09
Payer: COMMERCIAL

## 2024-08-09 VITALS
BODY MASS INDEX: 39.23 KG/M2 | HEART RATE: 88 BPM | HEIGHT: 73 IN | SYSTOLIC BLOOD PRESSURE: 118 MMHG | RESPIRATION RATE: 18 BRPM | WEIGHT: 296 LBS | DIASTOLIC BLOOD PRESSURE: 73 MMHG

## 2024-08-09 DIAGNOSIS — M25.561 CHRONIC PAIN OF RIGHT KNEE: Primary | ICD-10-CM

## 2024-08-09 DIAGNOSIS — G89.29 CHRONIC PAIN OF RIGHT KNEE: Primary | ICD-10-CM

## 2024-08-09 PROCEDURE — 3074F SYST BP LT 130 MM HG: CPT | Performed by: FAMILY MEDICINE

## 2024-08-09 PROCEDURE — 3078F DIAST BP <80 MM HG: CPT | Performed by: FAMILY MEDICINE

## 2024-08-09 PROCEDURE — 3008F BODY MASS INDEX DOCD: CPT | Performed by: FAMILY MEDICINE

## 2024-08-09 PROCEDURE — 99213 OFFICE O/P EST LOW 20 MIN: CPT | Performed by: FAMILY MEDICINE

## 2024-08-09 RX ORDER — MELOXICAM 15 MG/1
15 TABLET ORAL DAILY
Qty: 30 TABLET | Refills: 1 | Status: SHIPPED | OUTPATIENT
Start: 2024-08-09 | End: 2024-10-08

## 2024-08-09 RX ORDER — TRIAMCINOLONE ACETONIDE 40 MG/ML
40 INJECTION, SUSPENSION INTRA-ARTICULAR; INTRAMUSCULAR ONCE
Status: SHIPPED | OUTPATIENT
Start: 2024-08-09

## 2024-08-09 NOTE — PROCEDURES
Informed consent obtained all questions answered    Aseptic technique employed for right knee aspiration and corticosteroid injection.    Kenalog 40 mg/ML x 1 mL with lidocaine 1% x 4 mL injected    Patient tolerated well no fluid aspirated

## 2024-08-09 NOTE — PROGRESS NOTES
Blood pressure 118/73, pulse 88, resp. rate 18, height 6' 1\" (1.854 m), weight 296 lb (134.3 kg).      Presents today complaining of right knee pain that is chronic.  Had surgery more than 30 years ago.  No hardware placed.  No recent injuries.  Hurts coming downstairs.  No catching or locking.  Some buckling.  Has not taken any medication.    Objective right knee with no gross changes good range of motion    Assessment right knee arthritis    Plan corticosteroid injection x-ray ordered meloxicam prescription with food no alcohol

## 2024-08-10 ENCOUNTER — HOSPITAL ENCOUNTER (OUTPATIENT)
Dept: GENERAL RADIOLOGY | Age: 69
Discharge: HOME | End: 2024-08-10
Attending: FAMILY MEDICINE
Payer: COMMERCIAL

## 2024-08-10 DIAGNOSIS — G89.29 CHRONIC PAIN OF RIGHT KNEE: ICD-10-CM

## 2024-08-10 DIAGNOSIS — M25.561 CHRONIC PAIN OF RIGHT KNEE: ICD-10-CM

## 2024-08-10 PROCEDURE — 73562 X-RAY EXAM OF KNEE 3: CPT | Performed by: FAMILY MEDICINE

## 2024-08-12 ENCOUNTER — LAB ENCOUNTER (OUTPATIENT)
Dept: LAB | Facility: HOSPITAL | Age: 69
End: 2024-08-12
Attending: INTERNAL MEDICINE
Payer: COMMERCIAL

## 2024-08-12 DIAGNOSIS — E78.00 HYPERCHOLESTEROLEMIA: Primary | ICD-10-CM

## 2024-08-12 LAB
CHOLEST SERPL-MCNC: 211 MG/DL (ref ?–200)
FASTING PATIENT LIPID ANSWER: YES
HDLC SERPL-MCNC: 46 MG/DL (ref 40–59)
LDLC SERPL CALC-MCNC: 137 MG/DL (ref ?–100)
NONHDLC SERPL-MCNC: 165 MG/DL (ref ?–130)
TRIGL SERPL-MCNC: 156 MG/DL (ref 30–149)
VLDLC SERPL CALC-MCNC: 29 MG/DL (ref 0–30)

## 2024-08-12 PROCEDURE — 36415 COLL VENOUS BLD VENIPUNCTURE: CPT

## 2024-08-12 PROCEDURE — 80061 LIPID PANEL: CPT

## 2024-08-19 ENCOUNTER — PATIENT MESSAGE (OUTPATIENT)
Dept: FAMILY MEDICINE CLINIC | Facility: CLINIC | Age: 69
End: 2024-08-19

## 2024-08-19 NOTE — TELEPHONE ENCOUNTER
[email or web address] message sent to patient in response to [email or web address] message received--->see message.

## 2024-08-21 ENCOUNTER — TELEPHONE (OUTPATIENT)
Dept: FAMILY MEDICINE CLINIC | Facility: CLINIC | Age: 69
End: 2024-08-21

## 2024-08-21 DIAGNOSIS — H26.9 CATARACT OF BOTH EYES, UNSPECIFIED CATARACT TYPE: ICD-10-CM

## 2024-08-21 DIAGNOSIS — H90.A32 MIXED CONDUCTIVE AND SENSORINEURAL HEARING LOSS OF LEFT EAR WITH RESTRICTED HEARING OF RIGHT EAR: Primary | ICD-10-CM

## 2024-08-21 NOTE — TELEPHONE ENCOUNTER
Patient called, verified Name and . States he needs referral for ENT. States he had a procedure done in the left ear last year where the inner ear was scraped and covered a hole in the eardrum last September and was told to follow up in 6 months but he did not go. Also wants to address his not so great hearing in the right ear.    Also seen his optometrist a few weeks ago and was told that he has cataracts on both eyes. He wants to know who he should address this to.     Dr. Hernandez please advise. Pended ENT referral for review and approval if appropriate. Unsure which Ophthalmology group you want the patient to go to [facility] or [facility]; pended [facility].

## 2024-08-26 ENCOUNTER — HOSPITAL ENCOUNTER (OUTPATIENT)
Dept: ULTRASOUND IMAGING | Facility: HOSPITAL | Age: 69
Discharge: HOME | End: 2024-08-26
Attending: FAMILY MEDICINE
Payer: COMMERCIAL

## 2024-08-26 DIAGNOSIS — N50.89 TESTICULAR MASS: ICD-10-CM

## 2024-08-26 PROCEDURE — 93975 VASCULAR STUDY: CPT | Performed by: FAMILY MEDICINE

## 2024-08-26 PROCEDURE — 76870 US EXAM SCROTUM: CPT | Performed by: FAMILY MEDICINE

## 2024-09-04 ENCOUNTER — PATIENT MESSAGE (OUTPATIENT)
Dept: FAMILY MEDICINE CLINIC | Facility: CLINIC | Age: 69
End: 2024-09-04

## 2024-09-04 NOTE — TELEPHONE ENCOUNTER
From: Lanre Melchor  To: Wade Hernandez  Sent: 9/4/2024 11:43 AM CDT  Subject: Ent    I’ve sent a message and talk to a nurse about getting a referral to an ENT about my hearing issues. The nurse said she would call me back after she talked to the doctor about it still have not heard anything in the last two weeks?

## 2024-09-06 ENCOUNTER — OFFICE VISIT (OUTPATIENT)
Dept: AUDIOLOGY | Facility: CLINIC | Age: 69
End: 2024-09-06

## 2024-09-06 ENCOUNTER — OFFICE VISIT (OUTPATIENT)
Dept: OTOLARYNGOLOGY | Facility: CLINIC | Age: 69
End: 2024-09-06
Payer: COMMERCIAL

## 2024-09-06 DIAGNOSIS — H90.A32 MIXED CONDUCTIVE AND SENSORINEURAL HEARING LOSS OF LEFT EAR WITH RESTRICTED HEARING OF RIGHT EAR: ICD-10-CM

## 2024-09-06 DIAGNOSIS — H90.3 SENSORINEURAL HEARING LOSS (SNHL), BILATERAL: Primary | ICD-10-CM

## 2024-09-06 DIAGNOSIS — H90.A21 SENSORINEURAL HEARING LOSS (SNHL) OF RIGHT EAR WITH RESTRICTED HEARING OF LEFT EAR: ICD-10-CM

## 2024-09-06 DIAGNOSIS — H69.92 CHRONIC EUSTACHIAN TUBE DYSFUNCTION, LEFT: Primary | ICD-10-CM

## 2024-09-06 DIAGNOSIS — H93.13 BILATERAL TINNITUS: ICD-10-CM

## 2024-09-06 DIAGNOSIS — H90.6 MIXED HEARING LOSS, BILATERAL: ICD-10-CM

## 2024-09-06 PROCEDURE — 92567 TYMPANOMETRY: CPT | Performed by: AUDIOLOGIST

## 2024-09-06 PROCEDURE — 92557 COMPREHENSIVE HEARING TEST: CPT | Performed by: AUDIOLOGIST

## 2024-09-06 PROCEDURE — 99214 OFFICE O/P EST MOD 30 MIN: CPT | Performed by: OTOLARYNGOLOGY

## 2024-09-06 RX ORDER — EVOLOCUMAB 140 MG/ML
INJECTION, SOLUTION SUBCUTANEOUS
COMMUNITY

## 2024-09-06 NOTE — PROGRESS NOTES
RETURNING PATIENT PROGRESS NOTE  OTOLOGY/OTOLARYNGOLOGY    REF MD:  Wade Hernandez Do  [address] [facility] [address]  [address]  [place] [address]     PCP: Wade Hernandez DO    CHIEF COMPLAINT:    Chief Complaint   Patient presents with    Hearing Loss     Hearing loss of right ear     LAST OFFICE VISIT 10/23/23  IMPRESSION:  Left tympanic membrane perforation - resolved  Left eustachian tube dysfunction  Left mixed hearing loss, significant low frequency conductive hearing loss improvement after surgery   S/p left tympanoplasty and left Eustachian balloon dilation on 8/22/23  Right SNHL  Bilateral tinnitus, subjective, non-pulsatile     PLAN:  -Audiogram reviewed with patient - hearing improved  -No activity restrictions  -OK to have hearing aids adjusted   -Patient also with nasal breathing issues - may benefit from septoplasty and functional rhinoplasty vs isolated left internal nasal valve treatment. Will further consider this after ear surgery.  -Follow-up in 6 months for Eustachian tube dysfunction symptoms in left ear   ________________________________________________________________    INTERVAL HX: Patient reports that the right ear is decreased. He states that it seems as if the hearing aids makes his hearing worse. Denies experiencing feedback with the hearing aid.     HISTORY OF PRESENT ILLNESS: Lanre Melchor is a 69 year old male who presents for evaluation of tympanic membrane perforation. The patient was seen by Dr. Harding about 3-4 months ago and had persistent effusion in the left ear after a URI. This had happened to the patient in the past and would take a very long time to clear. It was therefore recommended he undergo myringotomy. The patient underwent this procedure with improvement to pressure/fluid, but he noted his hearing worsened and has not improved. Patient has bilateral subjective non-pulsatile tinntius. Denies vertigo, dizziness, otorrhea, otalgia or previous otologic procedure. He  is not flying very often, but does sometimes for business. He has a history of many ear infections when he was younger.    PAST MEDICAL HISTORY:    Past Medical History:    Arrhythmia    Svt    BCC (basal cell carcinoma)    left forearm proximal    BCC (basal cell carcinoma)    left forearm distal    High blood pressure    High cholesterol    Obesity    Other and unspecified hyperlipidemia    Sleep apnea    not using machine    SVT (supraventricular tachycardia) (HCC)    Unspecified essential hypertension       PAST SURGICAL HISTORY:    Past Surgical History:   Procedure Laterality Date    Colonoscopy      Colonoscopy N/A 7/29/2024    Procedure: COLONOSCOPY;  Surgeon: Kristan Tovar MD;  Location: [facility] ENDOSCOPY    Colonoscopy & polypectomy      Other surgical history  02/2017    heart ablation     Other surgical history  08/22/2023    1. Left tympanoplasty, 2. Cartilage graft, 3. Left eustachian tube balloon dilation 4. Facial nerve monitoring    Vasectomy         Current Outpatient Medications on File Prior to Visit   Medication Sig Dispense Refill    REPATHA SURECLICK 140 MG/ML Subcutaneous Solution Auto-injector INJECT 140MG SUBCUTANEOUSLY ONCE EVERY 2 WEEKS.      Meloxicam 15 MG Oral Tab Take 1 tablet (15 mg total) by mouth daily. With meals. (pain/inflammation). 30 tablet 1    telmisartan 80 MG Oral Tab Take 1 tablet (80 mg total) by mouth daily. 90 tablet 3    hydrocortisone 2.5 % External Cream Apply to the affected areas on face up to twice daily Monday-Friday with flares. Take weekends off. (Patient not taking: Reported on 8/9/2024) 28 g 2     Current Facility-Administered Medications on File Prior to Visit   Medication Dose Route Frequency Provider Last Rate Last Admin    triamcinolone acetonide (Kenalog-40) 40 MG/ML injection 40 mg  40 mg Intra-articular Once            Allergies:   Allergies   Allergen Reactions    Lisinopril ANAPHYLAXIS    Pravastatin PAIN     Severe joint pain.         SOCIAL HISTORY:     Social History     Tobacco Use    Smoking status: Former     Passive exposure: Past    Smokeless tobacco: Never   Substance Use Topics    Alcohol use: Yes     Alcohol/week: 4.0 standard drinks of alcohol     Types: 4 Standard drinks or equivalent per week     Comment: Occasionally       FAMILY HISTORY: Denies known family history of hearing loss, tinnitus, vertigo, or migraine.  Denies known family history of head and neck cancer, thyroid cancer, bleeding disorders.     REVIEW OF SYSTEMS:   Positives are in bold  Neuro: Headache, facial weakness, facial numbness, neck pain, vertigo  ENT: Hearing change, tinnitus, otorrhea, otalgia, aural fullness, ear pressure, vertigo, imbalance  Sinus pressure, rhinorrhea, congestion, facial pain, jaw pain, dysphagia, odynophagia, sore throat, voice changes, shortness of breath    EXAMINATION:  I washed my hands with an alcohol-based hand gel prior to examination  Constitutional:   --Vitals: There were no vitals taken for this visit.  --General: no apparent distress, well-developed, conversant  Psych: affect pleasant and appropriate for age, alert and oriented  Neuro: Facial movement normal bilateral  Eyes: Pupils equal, symmetric and reactive to light.  Extra-ocular muscles intact  Respiratory: No stridor, stertor or increased work of breathing  ENT:  --Nose: nasal tip pointed to the left, no external nasal valve collapse, bilateral anterior septal deviation, it is not caudal, right worse than left, mucosa healthy, no inferior turbinate hypertrophy  --Ear: The ears were examined under binocular microscopy  Right ear microscopic exam:  Pinna: Normal, no lesions or masses.  Mastoid: Nontender on palpation.   External auditory canal: Clear, no masses or lesions.  Tympanic membrane: Intact, no lesions, normal landmarks.  Middle ear: Aerated.    Left ear microscopic exam:  Pinna: Tragal incision is well-healed without hematoma.  Mastoid: Nontender on palpation.   External auditory  canal: Clear, no masses or lesions  Tympanic membrane: Intact with good integration of tragal and perichondrium graft. No residual perforation.  Middle ear: Aerated    Nasopharyngoscopy 07/26/23:  Informed consent obtained, patient was correctly identified  Topical anesthesia with aerosolized lidocaine and ephedrine spray in the bilateral nares  Findings: The flexible scope was advanced into the bilateral nares via the inferior meatus into the nasopharynx. Bilateral anterior septal deviation, but not in the caudal septum. More severe on the right (touching the inferior turbinate) than on the left. Beyond the septal deviation which is localized the left inferior nasal passage is clear. The bilateral eustachian tubes are patent. No masses or lesions in the bilateral nasopharynx or fossae of Rosenmueller.  Complications none, procedure well tolerated    Latest Audiogram Result (Hz) Exam performed: 10/23/2023 10:20 AM Last edited by Yolie Wilder MS, CCC-A on 10/23/2023 10:40 AM        125 250  1500 2000 3000 4000 6000 8000    Right air:  20 55  50 30 25  35 65 55    Left air:  30 50  45 50 70  75 80 80    Right mastoid bone:      30 25  35      Left mastoid bone:   45  25          Right mastoid bone (masked):   50  35          Left mastoid bone (masked):      30 70  55         Reliability:  Good    Transducer:  Inserts    Technique:  Conventional Audiometry    Comments:            Latest Speech Audiometry  Last edited by Yolie Wilder MS, CCC-A on 10/23/2023 10:29 AM       Ear Method PTA SAT SRT [facility] Test/list Score (%) Intensity Mask/noise Notes    right    35    100 75      left    40    96 80                    Latest Tympanogram Result       Probe Tone (Hz): 226 Exam performed: 10/23/2023 10:20 AM Last edited by Yolie Wilder MS, CCC-A on 10/23/2023 10:40 AM      Tympanograms  These were drawn by a user, not generated from device data      Right Ear Left Ear                     Right Ear Left Ear     Tympanogram type: Type A Type B    Canal volume (mL): 2.4 1.9    Peak pressure (daPa): -40     Peak amplitude (mL): 0.35     Tympanogram width (daPa):        Comments:                    Latest Audiogram and Tympanogram Result Text  Last edited by Yolie Wilder MS, CCC-A on 10/23/2023 10:40 AM      Study Result                 Narrative & Impression  Mixed hearing loss.  Last audiogram 7/26/2023.  S/p left tympanoplasty and skin graft.  Left ear improved in low frequencies.    F/u with Dr. Villalobos today.    Latest Audiogram Result (Hz) Exam performed: 9/6/2024 1:12 PM Last edited by Yolie Wilder MS, CCC-A on 9/6/2024 1:29 PM        125 250  1500 2000 3000 4000 6000 8000    Right air:  25 55  50 35 35  45 65 65    Left air:  30 50  45 50 70  75 80 80    Right mastoid bone (masked):   40  35 30 35  45      Left mastoid bone (masked):   40  30 30 65  60         Reliability:  Good    Transducer:  Inserts    Technique:  Conventional Audiometry    Comments:            Latest Speech Audiometry  Last edited by Yolie Wilder MS, CCC-A on 9/6/2024 1:28 PM       Ear Method PTA SAT SRT [facility] Test/list Score (%) Intensity Mask/noise Notes    right    40    100 60      left    35    96 80                    Latest Tympanogram Result       Probe Tone (Hz): 226 Exam performed: 9/6/2024 1:12 PM Last edited by Yolie Wilder MS, CCC-A on 9/6/2024 1:29 PM      Tympanograms  These were drawn by a user, not generated from device data      Right Ear Left Ear                     Right Ear Left Ear    Tympanogram type: Type A Type As    Canal volume (mL): 1.8 1.8    Peak pressure (daPa): -10 -135    Peak amplitude (mL): 0.4 0.2    Tympanogram width (daPa):        Comments:                    Latest Audiogram and Tympanogram Result Text  Last edited by Yolie Wilder MS, CCC-A on 9/6/2024  1:29 PM      Study Result                 Narrative & Impression  RE: Mild to moderately-severe mixed hearing loss.  LE: Mild to severe mixed  hearing loss.    Excellent word rec at 60 (RE) and 80 (LE) dBHL.    Tymps:  RE: WNL.  LE: Shallow.    F/u with Dr. Villalobos today.                    ASSESSMENT/PLAN:  Lanre Melchor is a 69 year old male with     ICD-10-CM   1. Chronic Eustachian tube dysfunction, left  H69.92   2. Sensorineural hearing loss (SNHL) of right ear with restricted hearing of left ear  H90.A21   3. Mixed conductive and sensorineural hearing loss of left ear with restricted hearing of right ear  H90.A32   4. Bilateral tinnitus  H93.13        IMPRESSION:  Left tympanic membrane perforation - resolved  Left eustachian tube dysfunction  Left mixed hearing loss, significant low frequency conductive hearing loss improvement after surgery   S/p left tympanoplasty and left Eustachian balloon dilation on 8/22/23, tympanometry improved but will never be type A due to cartilage tympanoplasty  Right SNHL  Bilateral tinnitus, subjective, non-pulsatile     PLAN:  -Audiogram reviewed with patient - hearing stable from prior exam. Although the right ear has more difficulty hearing, the right ear remains the better hearing ear. There may be an issue with the hearing aid.   -OK to have hearing aids adjusted   -Patient also with nasal breathing issues - may benefit from septoplasty and functional rhinoplasty vs isolated left internal nasal valve treatment. Recommend follow up with Dr. Philippe.   -Follow up with me in 1 year    Situation reviewed with the patient in detail.    Attention: This note has been scribed by Annmarie Carney under the supervision of Chi Moses MD.       Chi Moses MD  Otology/Otolaryngology  [facility]   [address] [place] [address] [address] [place] [address]  Phone [phone]  Fax [phone]     I have personally performed the services described in this documentation. All medical record entries made by the scribe were at my direction and in my presence. I have reviewed the chart  and agree that the medical record reflects my personal performance and is accurate and complete.

## 2024-09-06 NOTE — PROGRESS NOTES
Subjective:   Patient ID: Lanre Melchor is a 69 year old male.    HPI    History/Other:   Review of Systems  Current Outpatient Medications   Medication Sig Dispense Refill    Meloxicam 15 MG Oral Tab Take 1 tablet (15 mg total) by mouth daily. With meals. (pain/inflammation). 30 tablet 1    telmisartan 80 MG Oral Tab Take 1 tablet (80 mg total) by mouth daily. 90 tablet 3    hydrocortisone 2.5 % External Cream Apply to the affected areas on face up to twice daily Monday-Friday with flares. Take weekends off. (Patient not taking: Reported on 8/9/2024) 28 g 2     Allergies:  Allergies   Allergen Reactions    Lisinopril ANAPHYLAXIS    Pravastatin PAIN     Severe joint pain.         Objective:   Physical Exam    Assessment & Plan:   No diagnosis found.    No orders of the defined types were placed in this encounter.      Meds This Visit:  Requested Prescriptions      No prescriptions requested or ordered in this encounter       Imaging & Referrals:  None

## 2024-09-16 ENCOUNTER — TELEPHONE (OUTPATIENT)
Dept: FAMILY MEDICINE CLINIC | Facility: CLINIC | Age: 69
End: 2024-09-16

## 2024-09-16 NOTE — TELEPHONE ENCOUNTER
Spoke to patient and reminded him of  message below.    Patient states had a CT Cardiac over read by  on 8/8/24 which states they took into consideration the lung nodules found in the last CT chest.    Patient states this should be enough review. Would only like a call back if  wants him to do another CT.    Please advise    Look under imaging CT Cardiac over read dated 8/8/24

## 2024-10-17 ENCOUNTER — OFFICE VISIT (OUTPATIENT)
Dept: AUDIOLOGY | Facility: CLINIC | Age: 69
End: 2024-10-17
Payer: COMMERCIAL

## 2024-10-17 DIAGNOSIS — H90.3 SENSORINEURAL HEARING LOSS (SNHL), BILATERAL: Primary | ICD-10-CM

## 2024-10-17 PROCEDURE — 92592 HEARING AID CHECK, ONE EAR: CPT | Performed by: AUDIOLOGIST

## 2024-10-17 NOTE — PROGRESS NOTES
HEARING AID FOLLOW-UP    Lanre Melchor  6/9/1955  [id number]    Patient is here for an annual.    Hearing Aid Information        Right     Left    Phonak Phonak    Audeo L90-R Audeo L90-R    [id number]  [id number]    Champagne  Champagne    Moderate #2 speaker Moderate #2 speaker    C-Shell [id number] C-Shell [id number]    ACC 506190 ACC [id number]    Cerustop Cerustop           The patient has the following concerns:   Not hearing well from right hearing aid.     In office the following actions were taken:  Cleaned aid  Cleaned earmold  Suctioned microphone port  Changed wax guard  Listening check    Most recent audiogram entered into software.  Right aid reprogrammed to increase amplification.   New feedback manager run on both aids.     Real Ear Measurements (REM) were taken today in right ear and aid was found to be meeting NAL NL2 targets for soft and average speech inputs.  Aids do not exceed MPO.        Patient is to follow up in 1 year or sooner if needed.     10/17/2024  ELLEN Montalvo

## 2024-11-08 ENCOUNTER — LAB ENCOUNTER (OUTPATIENT)
Dept: LAB | Facility: HOSPITAL | Age: 69
End: 2024-11-08
Attending: INTERNAL MEDICINE
Payer: COMMERCIAL

## 2024-11-08 DIAGNOSIS — E78.00 HYPERCHOLESTEROLEMIA: Primary | ICD-10-CM

## 2024-11-08 LAB
CHOLEST SERPL-MCNC: 85 MG/DL (ref ?–200)
FASTING PATIENT LIPID ANSWER: YES
HDLC SERPL-MCNC: 29 MG/DL (ref 40–59)
LDLC SERPL CALC-MCNC: 39 MG/DL (ref ?–100)
NONHDLC SERPL-MCNC: 56 MG/DL (ref ?–130)
TRIGL SERPL-MCNC: 86 MG/DL (ref 30–149)
VLDLC SERPL CALC-MCNC: 12 MG/DL (ref 0–30)

## 2024-11-08 PROCEDURE — 36415 COLL VENOUS BLD VENIPUNCTURE: CPT

## 2024-11-08 PROCEDURE — 80061 LIPID PANEL: CPT

## 2024-11-15 ENCOUNTER — HOSPITAL ENCOUNTER (OUTPATIENT)
Age: 69
Discharge: HOME | End: 2024-11-15
Payer: COMMERCIAL

## 2024-11-15 ENCOUNTER — APPOINTMENT (OUTPATIENT)
Dept: GENERAL RADIOLOGY | Age: 69
End: 2024-11-15
Attending: NURSE PRACTITIONER
Payer: COMMERCIAL

## 2024-11-15 VITALS
OXYGEN SATURATION: 97 % | TEMPERATURE: 97 F | DIASTOLIC BLOOD PRESSURE: 69 MMHG | SYSTOLIC BLOOD PRESSURE: 128 MMHG | RESPIRATION RATE: 22 BRPM | HEART RATE: 74 BPM

## 2024-11-15 DIAGNOSIS — J40 BRONCHITIS: Primary | ICD-10-CM

## 2024-11-15 PROCEDURE — 71046 X-RAY EXAM CHEST 2 VIEWS: CPT | Performed by: NURSE PRACTITIONER

## 2024-11-15 PROCEDURE — 99214 OFFICE O/P EST MOD 30 MIN: CPT

## 2024-11-15 PROCEDURE — 99213 OFFICE O/P EST LOW 20 MIN: CPT

## 2024-11-15 RX ORDER — METHYLPREDNISOLONE 4 MG/1
TABLET ORAL
Qty: 1 EACH | Refills: 0 | Status: SHIPPED | OUTPATIENT
Start: 2024-11-15

## 2024-11-15 RX ORDER — ALBUTEROL SULFATE 90 UG/1
2 INHALANT RESPIRATORY (INHALATION) EVERY 4 HOURS PRN
Qty: 1 EACH | Refills: 0 | Status: SHIPPED | OUTPATIENT
Start: 2024-11-15 | End: 2024-12-15

## 2024-11-15 RX ORDER — METHYLPREDNISOLONE 4 MG/1
TABLET ORAL
Qty: 1 EACH | Refills: 0 | Status: SHIPPED | OUTPATIENT
Start: 2024-11-15 | End: 2024-11-15

## 2024-11-15 RX ORDER — BENZONATATE 100 MG/1
100 CAPSULE ORAL 3 TIMES DAILY PRN
Qty: 30 CAPSULE | Refills: 0 | Status: SHIPPED | OUTPATIENT
Start: 2024-11-15 | End: 2024-12-15

## 2024-11-15 NOTE — DISCHARGE INSTRUCTIONS
The x-ray does not demonstrate a pneumonia.  You do have some bronchial wall thickening consistent with bronchitis.  I have sent albuterol inhaler to your pharmacy that you may use every 4 hours for cough or wheezing.  I have also sent a cough medication that you may take up to 3 times a day as needed for cough.  Any fever, shortness of breath or chest pain please go to the emergency department.

## 2024-11-15 NOTE — ED INITIAL ASSESSMENT (HPI)
Pt complaining of cough and cold symptoms since Tuesday. Negative covid test at home. Denies fevers. Denies sick contacts.

## 2024-11-15 NOTE — ED PROVIDER NOTES
Patient Seen in: [facility] [place]      History     Chief Complaint   Patient presents with    Cough/URI     Stated Complaint: cough  Subjective:   HPI    This is a well-appearing 69-year-old who presents with cough, congestion which started on Tuesday.  No shortness of breath or chest pain but does report chest congestion.  Denies any fever or chills.  Denies any sore throat.  No headache or dizziness.  Not a smoker.  No history of pneumonia.      Objective:   No pertinent past medical history.          No pertinent past surgical history.            No pertinent social history.          Review of Systems   All other systems reviewed and are negative.      Positive for stated complaint: Cough/URI    Other systems are as noted in HPI.  Constitutional and vital signs reviewed.      All other systems reviewed and negative except as noted above.    Physical Exam     ED Triage Vitals [11/15/24 1039]   /69   Pulse 74   Resp 22   Temp 97.4 °F (36.3 °C)   Temp src Temporal   SpO2 97 %   O2 Device None (Room air)     Current:/69   Pulse 74   Temp 97.4 °F (36.3 °C) (Temporal)   Resp 22   SpO2 97%     Physical Exam  Vitals and nursing note reviewed.   Constitutional:       General: He is awake. He is not in acute distress.     Appearance: Normal appearance. He is not ill-appearing, toxic-appearing or diaphoretic.   HENT:      Head: Normocephalic and atraumatic.      Right Ear: Tympanic membrane, ear canal and external ear normal.      Left Ear: Tympanic membrane, ear canal and external ear normal.      Nose: Nose normal.      Mouth/Throat:      Mouth: Mucous membranes are moist.      Pharynx: Oropharynx is clear. Uvula midline.   Eyes:      General: Lids are normal.      Extraocular Movements: Extraocular movements intact.      Conjunctiva/sclera: Conjunctivae normal.      Pupils: Pupils are equal, round, and reactive to light.   Cardiovascular:      Rate and Rhythm: Normal rate and regular rhythm.       Pulses: Normal pulses.      Heart sounds: Normal heart sounds.   Pulmonary:      Effort: Pulmonary effort is normal.      Breath sounds: Normal breath sounds and air entry. No stridor, decreased air movement or transmitted upper airway sounds.      Comments: Persistent cough on examination  Skin:     General: Skin is warm and dry.      Capillary Refill: Capillary refill takes less than 2 seconds.   Neurological:      General: No focal deficit present.      Mental Status: He is alert and oriented to person, place, and time.   Psychiatric:         Mood and Affect: Mood normal.         Behavior: Behavior normal. Behavior is cooperative.         Thought Content: Thought content normal.         Judgment: Judgment normal.       ED Course   Xray and re-evaluate   Chest x-ray reviewed, slight bronchial wall thickening most compatible with bronchitis.  XR CHEST PA + LAT CHEST (CPT=71046)    Result Date: 11/15/2024  CONCLUSION: Slight bronchial wall thickening most compatible with bronchitis.    Dictated by (CST): George Mejia MD on 11/15/2024 at 11:15 AM     Finalized by (CST): George Mejia MD on 11/15/2024 at 11:17 AM          XR CHEST PA + LAT CHEST (CPT=71046)    Result Date: 11/15/2024  CONCLUSION: Slight bronchial wall thickening most compatible with bronchitis.    Dictated by (CST): George Mejia MD on 11/15/2024 at 11:15 AM     Finalized by (CST): George Mejia MD on 11/15/2024 at 11:17 AM         Labs Reviewed - No data to display    MDM     Medical Decision Making  Differential diagnoses reflecting the complexity of care include but are not limited to pneumonia, bronchitis, viral upper respiratory infection..    Comorbidities that add complexity to management include: N/A  History obtained by an independent source was from: N/A  Discussions of management was done with: N/A  My independent interpretations of studies include: Chest x-ray, personally reviewed the images.  No evidence of pneumonia, slight  bronchial wall thickening compatible with bronchitis.  Shared decision making was done by: Patient and myself  Patient is well appearing, non-toxic and in no acute distress.  Vital signs are stable.  Discussed with patient bronchitis is likely viral in etiology.  I did discuss with him treatment with benzonatate, albuterol inhaler and prednisone of all which she has tolerated in the past.  He is not hypoxic or tachycardic at discharge, close follow-up with PCP and strict ER precautions given.  Patient verbalized plan of care and states understanding.    Problems Addressed:  Bronchitis: acute illness or injury    Amount and/or Complexity of Data Reviewed  Radiology: ordered and independent interpretation performed. Decision-making details documented in ED Course.  ECG/medicine tests: ordered and independent interpretation performed. Decision-making details documented in ED Course.    Risk  OTC drugs.  Prescription drug management.        Disposition and Plan     Clinical Impression:  1. Bronchitis         Disposition:  Discharge  11/15/2024 11:22 am    Follow-up:  Wade Hernandez, DO  [address] [place] [address]  [phone]                Medications Prescribed:  Current Discharge Medication List        START taking these medications    Details   benzonatate 100 MG Oral Cap Take 1 capsule (100 mg total) by mouth 3 (three) times daily as needed for cough.  Qty: 30 capsule, Refills: 0      albuterol 108 (90 Base) MCG/ACT Inhalation Aero Soln Inhale 2 puffs into the lungs every 4 (four) hours as needed for Wheezing.  Qty: 1 each, Refills: 0      methylPREDNISolone (MEDROL) 4 MG Oral Tablet Therapy Pack Dosepack: take as directed  Qty: 1 each, Refills: 0                Note to patient: The 21st Century cares act makes medical notes like these available to patients in the interest of transparency.  However, be advised this medical document and is intended as peer to peer communication.  It is read the  medical language and may contain abbreviations or verbiage that are unfamiliar.  It may appear blunt or direct.  Medical documents are intended to carry relevant information, fax is evident and the clinical opinion of the practitioner.    This note was prepared using Dragon Medical voice recognition dictation software.  As a result, errors may occur.  When identified, these errors have been corrected.  While every attempt is made to correct errors during dictation, discrepancies may still exist.    Vandana Lamb, JACOB  11/15/2024  11:05 AM

## 2024-12-02 ENCOUNTER — TELEPHONE (OUTPATIENT)
Dept: AUDIOLOGY | Facility: CLINIC | Age: 69
End: 2024-12-02

## 2024-12-02 NOTE — TELEPHONE ENCOUNTER
Right HAid repair received back in office; electronics replaced. Programmed to previous user settings.  Listening check good with no concerns.       STP & let him know HAid repair ready for pickup at his convenience.  Pt to call with any further questions/concerns that arise.

## 2024-12-03 ENCOUNTER — OFFICE VISIT (OUTPATIENT)
Dept: FAMILY MEDICINE CLINIC | Facility: CLINIC | Age: 69
End: 2024-12-03
Payer: COMMERCIAL

## 2024-12-03 VITALS
DIASTOLIC BLOOD PRESSURE: 76 MMHG | HEART RATE: 64 BPM | WEIGHT: 295 LBS | HEIGHT: 73 IN | BODY MASS INDEX: 39.1 KG/M2 | SYSTOLIC BLOOD PRESSURE: 140 MMHG

## 2024-12-03 DIAGNOSIS — G89.29 CHRONIC PAIN OF RIGHT KNEE: Primary | ICD-10-CM

## 2024-12-03 DIAGNOSIS — M25.561 CHRONIC PAIN OF RIGHT KNEE: Primary | ICD-10-CM

## 2024-12-03 PROCEDURE — 99213 OFFICE O/P EST LOW 20 MIN: CPT | Performed by: FAMILY MEDICINE

## 2024-12-03 PROCEDURE — 3008F BODY MASS INDEX DOCD: CPT | Performed by: FAMILY MEDICINE

## 2024-12-03 PROCEDURE — 3077F SYST BP >= 140 MM HG: CPT | Performed by: FAMILY MEDICINE

## 2024-12-03 PROCEDURE — 3078F DIAST BP <80 MM HG: CPT | Performed by: FAMILY MEDICINE

## 2024-12-03 RX ORDER — BENZONATATE 100 MG/1
100 CAPSULE ORAL 3 TIMES DAILY PRN
Qty: 45 CAPSULE | Refills: 0 | Status: SHIPPED | OUTPATIENT
Start: 2024-12-03 | End: 2024-12-03

## 2024-12-03 RX ORDER — MELOXICAM 15 MG/1
15 TABLET ORAL DAILY
Qty: 30 TABLET | Refills: 1 | Status: SHIPPED | OUTPATIENT
Start: 2024-12-03 | End: 2025-02-01

## 2024-12-03 NOTE — PROGRESS NOTES
Blood pressure 140/76, pulse 64, height 6' 1\" (1.854 m), weight 295 lb (133.8 kg).      2-week history of cough that is not nocturnal no phlegm production no nasal congestion.  No sore throat or ear pain.  No fever no chills.  No difficulty breathing no headache.  No facial pressure no bad breath no tooth pain.  Some sneezing, watery and itchy eyes.  Some relief with Coricidin HBP.  No asthma history no pets or smoking.    Subjective    Patient comfortable no apparent distress    Throat clear not erythematous    Lungs clear to auscultation without rales rhonchi or wheezes    Assessment cough improving per patient    Plan continue Coricidin HBP    Use benzonatate    Also orthopedic referral for right knee arthritis

## 2024-12-12 ENCOUNTER — HOSPITAL ENCOUNTER (OUTPATIENT)
Dept: GENERAL RADIOLOGY | Facility: HOSPITAL | Age: 69
Discharge: HOME | End: 2024-12-12
Attending: ORTHOPAEDIC SURGERY
Payer: COMMERCIAL

## 2024-12-12 ENCOUNTER — OFFICE VISIT (OUTPATIENT)
Dept: ORTHOPEDICS CLINIC | Facility: CLINIC | Age: 69
End: 2024-12-12
Payer: COMMERCIAL

## 2024-12-12 VITALS — WEIGHT: 290 LBS | HEIGHT: 73 IN | BODY MASS INDEX: 38.43 KG/M2

## 2024-12-12 DIAGNOSIS — M25.561 RIGHT KNEE PAIN, UNSPECIFIED CHRONICITY: ICD-10-CM

## 2024-12-12 DIAGNOSIS — M17.11 PRIMARY OSTEOARTHRITIS OF RIGHT KNEE: Primary | ICD-10-CM

## 2024-12-12 PROCEDURE — 73560 X-RAY EXAM OF KNEE 1 OR 2: CPT | Performed by: ORTHOPAEDIC SURGERY

## 2024-12-12 NOTE — PROGRESS NOTES
NURSING INTAKE COMMENTS:   Chief Complaint   Patient presents with    Knee Pain     Consult - R knee - Pt has hx of knee scope about 20 years ago.  Pain when walking down stairs and uneven surfaces.        HPI: This 69 year old male presents today with complaints of right knee pain.  He said progressive pain in the right knee for a number of years.  He had surgery on the right knee 15 or 20 years ago which was an arthroscopic procedure.  He did wrestle in college.  He has a history of gout.  There is been no recent injury to the knee.  He reports pain when going up down the stairs.  He feels a noise in the knee.  He feels discomfort when walking on uneven surfaces and playing golf.  Majority the pain is over the medial knee.  He reports no swelling no mechanical clicking.  He walks every day and has some pain with walking.    Past Medical History:    Arrhythmia    Svt    BCC (basal cell carcinoma)    left forearm proximal    BCC (basal cell carcinoma)    left forearm distal    High blood pressure    High cholesterol    Obesity    Other and unspecified hyperlipidemia    Sleep apnea    not using machine    SVT (supraventricular tachycardia) (HCC)    Unspecified essential hypertension     Past Surgical History:   Procedure Laterality Date    Colonoscopy      Colonoscopy N/A 7/29/2024    Procedure: COLONOSCOPY;  Surgeon: Kristan Tovar MD;  Location: [facility] ENDOSCOPY    Colonoscopy & polypectomy      Other surgical history  02/2017    heart ablation     Other surgical history  08/22/2023    1. Left tympanoplasty, 2. Cartilage graft, 3. Left eustachian tube balloon dilation 4. Facial nerve monitoring    Vasectomy       Current Outpatient Medications   Medication Sig Dispense Refill    Meloxicam 15 MG Oral Tab Take 1 tablet (15 mg total) by mouth daily. With meals. (pain/inflammation). 30 tablet 1    albuterol 108 (90 Base) MCG/ACT Inhalation Aero Soln Inhale 2 puffs into the lungs every 4 (four) hours as needed for Wheezing.  1 each 0    REPATHA SURECLICK 140 MG/ML Subcutaneous Solution Auto-injector INJECT 140MG SUBCUTANEOUSLY ONCE EVERY 2 WEEKS.      telmisartan 80 MG Oral Tab Take 1 tablet (80 mg total) by mouth daily. 90 tablet 3     Allergies[1]  Family History   Problem Relation Age of Onset    Cancer Father         Brain    Skin cancer Father     Cancer Daughter         sarcoma    Melanoma Brother     Melanoma Brother        Social History     Occupational History    Not on file   Tobacco Use    Smoking status: Former     Passive exposure: Past    Smokeless tobacco: Never   Vaping Use    Vaping status: Never Used   Substance and Sexual Activity    Alcohol use: Yes     Alcohol/week: 4.0 standard drinks of alcohol     Types: 4 Standard drinks or equivalent per week     Comment: Occasionally    Drug use: Yes     Types: Cannabis     Comment: gummies to help sleep    Sexual activity: Not on file        Review of Systems:  GENERAL: denies fevers, chills, night sweats, fatigue, unintentional weight loss/gain  SKIN: denies skin lesions, open sores, rash  HEENT:denies recent vision change, new nasal congestion,hearing loss, tinnitus, sore throat, headaches  RESPIRATORY: denies new shortness of breath, cough, asthma, wheezing  CARDIOVASCULAR: denies chest pain, leg cramps with exertion, palpitations, leg swelling  GI: denies abdominal pain, nausea, vomiting, diarrhea, constipation, hematochezia, worsening heartburn or stomach ulcers  : denies dysuria, hematuria, incontinence, increased frequency, urgency, difficulty urinating  MUSCULOSKELETAL: denies musculoskeletal complaints other than in HPI  NEURO: denies numbness, tingling, weakness, balance issues, dizziness, memory loss  PSYCHIATRIC: denies Hx of depression, anxiety, other psychiatric disorders  HEMATOLOGIC: denies blood clots, anemia, blood clotting disorders, blood transfusion  ENDOCRINE: denies autoimmune disease, thyroid issues, or diabetes  ALLERGY: denies asthma, seasonal  allergies    Physical Examination:    Ht 6' 1\" (1.854 m)   Wt 290 lb (131.5 kg)   BMI 38.26 kg/m²   Constitutional: appears well hydrated, alert and responsive, no acute distress noted  Extremities: He walks with a slightly antalgic gait on the right.  Further exam the right knee reveals a subtle varus deformity.  He is tender at the medial joint line.  Range of motion is from 5 to 125 degrees.  Good varus valgus stability at 30 degrees and full extension.  Lachman sign and posterior drawer negative.  No.  Passive range of motion of the hip.  Neurological: Light touch and pinprick sensation intact throughout the lower extremities.  Ankle dorsiflexion plantarflexion EHL knee extension and hip flexion strength are 5 out of 5 bilaterally.  No clonus.    Imaging:   XR CHEST PA + LAT CHEST (CPT=71046)    Result Date: 11/15/2024  PROCEDURE: XR CHEST PA + LAT CHEST (CPT=71046)  COMPARISON: [facility], XR CHEST PA + LAT CHEST (PTP=58000), 5/14/2018, 9:23 AM.  INDICATIONS: Cough x 4 days  TECHNIQUE:   Two views.   FINDINGS: There is slight bronchial wall thickening within the bilateral perihilar regions.  The findings are not associated with any other abnormality and are nonspecific but are suggestive of bronchitis.  There is no focal consolidation, effusion, or pneumothorax.  The heart and mediastinal structures are minimally prominent.  Pulmonary vascularity is within normal limits.         CONCLUSION: Slight bronchial wall thickening most compatible with bronchitis.    Dictated by (CST): George Mejia MD on 11/15/2024 at 11:15 AM     Finalized by (CST): George Mejia MD on 11/15/2024 at 11:17 AM             Labs:  Lab Results   Component Value Date    WBC 6.8 05/06/2024    HGB 15.0 05/06/2024    .0 05/06/2024      Lab Results   Component Value Date     (H) 05/22/2024    BUN 19 05/22/2024    CREATSERUM 0.83 05/22/2024    GFRNAA 88 10/23/2020    GFRAA 102 10/23/2020         Assessment and Plan:  Diagnoses and all orders for this visit:    Primary osteoarthritis of right knee    Right knee pain, unspecified chronicity  -     XR KNEE (1 OR 2 VIEWS), RIGHT (CPT=73560); Future        Assessment: Right knee osteoarthritis, primary, medial compartment    Plan: I discussed operative and nonoperative treatment options.  Valgus stress x-ray obtained in the office today show good preservation of lateral compartment joint space.  There is correction of the varus deformity.  I feel he would be a suitable candidate for unicompartmental arthroplasty.  Risks and benefits of surgery were discussed.  Questions were answered.  No guarantees made.  He may elect for the surgery in the coming weeks or months.  I discussed other forms of nonoperative treatment for the knee.  He has failed all conservative treatments including anti-inflammatory medications therapeutic exercise activity modifications intra-articular injections of cortisone.  He would need medical and dental evaluation prior to surgery.  Follow-up with me again as needed.    Follow Up: Return if symptoms worsen or fail to improve.    KISHORE YOUSSEF MD       [1]   Allergies  Allergen Reactions    Lisinopril ANAPHYLAXIS    Pravastatin PAIN     Severe joint pain.

## 2024-12-26 ENCOUNTER — TELEPHONE (OUTPATIENT)
Dept: ORTHOPEDICS CLINIC | Facility: CLINIC | Age: 69
End: 2024-12-26

## 2024-12-26 DIAGNOSIS — M17.11 PRIMARY OSTEOARTHRITIS OF RIGHT KNEE: Primary | ICD-10-CM

## 2024-12-27 ENCOUNTER — TELEPHONE (OUTPATIENT)
Dept: FAMILY MEDICINE CLINIC | Facility: CLINIC | Age: 69
End: 2024-12-27

## 2024-12-27 DIAGNOSIS — R91.8 LUNG NODULES: Primary | ICD-10-CM

## 2024-12-30 NOTE — TELEPHONE ENCOUNTER
Patient called back and chose 1/24/25 for his surgery date. Patient was reminded that Medical and/or Dental clearance is needed within 30 days of surgical date. Failure to complete all testing in a timely manner will cause surgery to be delayed and/or rescheduled. Patient understood and had no further questions at this time.

## 2024-12-30 NOTE — TELEPHONE ENCOUNTER
Type of surgery:  Right knee unicompartmental arthroplasty   Date: 1/24/25  Location: [facility]  Medical Clearance:      *Medical: Yes      *Dental: Yes      *Other: Cardiac   Prior Authorization Status: Pending   Workers Comp:  Medacta/Shelli:  [place]: Yes  POV: 2/6/25

## 2024-12-30 NOTE — TELEPHONE ENCOUNTER
Spoke with patient to offer surgery dates. He asked for me to send him the dates through [facility] and he will call me back tomorrow with his chosen date.

## 2025-01-06 ENCOUNTER — HOSPITAL ENCOUNTER (OUTPATIENT)
Dept: CT IMAGING | Facility: HOSPITAL | Age: 70
Discharge: HOME | End: 2025-01-06
Attending: FAMILY MEDICINE
Payer: COMMERCIAL

## 2025-01-06 DIAGNOSIS — R91.8 LUNG NODULES: ICD-10-CM

## 2025-01-06 LAB
CREAT BLD-MCNC: 1 MG/DL
EGFRCR SERPLBLD CKD-EPI 2021: 81 ML/MIN/1.73M2 (ref 60–?)

## 2025-01-06 PROCEDURE — 82565 ASSAY OF CREATININE: CPT

## 2025-01-06 PROCEDURE — 71260 CT THORAX DX C+: CPT | Performed by: FAMILY MEDICINE

## 2025-01-08 ENCOUNTER — PATIENT MESSAGE (OUTPATIENT)
Dept: FAMILY MEDICINE CLINIC | Facility: CLINIC | Age: 70
End: 2025-01-08

## 2025-01-10 ENCOUNTER — PATIENT MESSAGE (OUTPATIENT)
Dept: FAMILY MEDICINE CLINIC | Facility: CLINIC | Age: 70
End: 2025-01-10

## 2025-01-10 ENCOUNTER — TELEPHONE (OUTPATIENT)
Dept: CASE MANAGEMENT | Age: 70
End: 2025-01-10

## 2025-01-10 DIAGNOSIS — Z01.810 ENCOUNTER FOR PRE-OPERATIVE CARDIOVASCULAR CLEARANCE: Primary | ICD-10-CM

## 2025-01-10 NOTE — TELEPHONE ENCOUNTER
Dr. Hernandez,     Patient has upcoming surgery with Dr. Lara and patient needs cardiac clearance with Dr. Andrews, patient has appointment on 1/13/25.    Pended referral please review diagnosis and sign off if you agree.    Thank you.  Karly Pierre  [facility]  .

## 2025-01-16 ENCOUNTER — OFFICE VISIT (OUTPATIENT)
Dept: FAMILY MEDICINE CLINIC | Facility: CLINIC | Age: 70
End: 2025-01-16
Payer: COMMERCIAL

## 2025-01-16 VITALS
BODY MASS INDEX: 39.36 KG/M2 | HEIGHT: 73 IN | SYSTOLIC BLOOD PRESSURE: 130 MMHG | HEART RATE: 66 BPM | WEIGHT: 297 LBS | DIASTOLIC BLOOD PRESSURE: 78 MMHG

## 2025-01-16 DIAGNOSIS — K21.9 CHRONIC GERD: ICD-10-CM

## 2025-01-16 DIAGNOSIS — J43.8 OTHER EMPHYSEMA (HCC): ICD-10-CM

## 2025-01-16 DIAGNOSIS — Z01.818 PREOP GENERAL PHYSICAL EXAM: Primary | ICD-10-CM

## 2025-01-16 DIAGNOSIS — E78.00 HIGH CHOLESTEROL: ICD-10-CM

## 2025-01-16 DIAGNOSIS — I10 ESSENTIAL HYPERTENSION WITH GOAL BLOOD PRESSURE LESS THAN 130/80: ICD-10-CM

## 2025-01-16 DIAGNOSIS — E87.6 HYPOKALEMIA: ICD-10-CM

## 2025-01-16 DIAGNOSIS — G47.33 OSA (OBSTRUCTIVE SLEEP APNEA): ICD-10-CM

## 2025-01-16 NOTE — PROGRESS NOTES
Blood pressure 130/78, pulse 66, height 6' 1\" (1.854 m), weight 297 lb (134.7 kg).      Preoperative physical for partial knee replacement right knee.  Surgery is scheduled for next week.  No history of problems with anesthesia.  No history of bleeding or clotting issues after operations.    Medications reviewed.    Patient had stress test last spring was normal.  Was cleared by [facility] cardiology this week.    Objective throat clear not erythematous    Neck supple no carotid bruits    Heart regular rate rhythm no murmurs    Lungs clear to auscultation no rales rhonchi or wheezes    Feet with good pulses and intact skin    Abdomen soft nontender nondistended    1. Preop general physical exam  We will send clearance information to Dr. Lara after results are in  - XR CHEST PA + LAT CHEST (CPT=71046); Future  - Prothrombin Time (PT); Future  - PTT, Activated; Future  - ALT(SGPT); Future  - Basic Metabolic Panel (8); Future  - AST (SGOT); Future  - TSH W Reflex To Free T4; Future  - CBC With Differential With Platelet; Future  - MRSA/SA Scrn by PCR; Future  - Urinalysis with Culture Reflex    2. Hypokalemia  As above    3. Essential hypertension with goal blood pressure less than 130/80  Telmisartan controlled    4. TANYA (obstructive sleep apnea)  Patient to notify anesthesia    5. Chronic GERD  No complaint at this time    6. High cholesterol  Repatha    7. Other emphysema (HCC)  Quit smoking more than 30 years ago

## 2025-01-17 ENCOUNTER — HOSPITAL ENCOUNTER (OUTPATIENT)
Dept: GENERAL RADIOLOGY | Facility: HOSPITAL | Age: 70
Discharge: HOME | End: 2025-01-17
Attending: FAMILY MEDICINE
Payer: COMMERCIAL

## 2025-01-17 ENCOUNTER — LAB ENCOUNTER (OUTPATIENT)
Dept: LAB | Facility: HOSPITAL | Age: 70
End: 2025-01-17
Attending: FAMILY MEDICINE
Payer: COMMERCIAL

## 2025-01-17 ENCOUNTER — TELEPHONE (OUTPATIENT)
Dept: FAMILY MEDICINE CLINIC | Facility: CLINIC | Age: 70
End: 2025-01-17

## 2025-01-17 DIAGNOSIS — Z01.818 PREOP GENERAL PHYSICAL EXAM: ICD-10-CM

## 2025-01-17 LAB
ALT SERPL-CCNC: 16 U/L
ANION GAP SERPL CALC-SCNC: 10 MMOL/L (ref 0–18)
APTT PPP: 29.2 SECONDS (ref 23–36)
AST SERPL-CCNC: 14 U/L (ref ?–34)
BASOPHILS # BLD AUTO: 0.04 X10(3) UL (ref 0–0.2)
BASOPHILS NFR BLD AUTO: 0.8 %
BILIRUB UR QL: NEGATIVE
BUN BLD-MCNC: 17 MG/DL (ref 9–23)
BUN/CREAT SERPL: 18.5 (ref 10–20)
CALCIUM BLD-MCNC: 9.6 MG/DL (ref 8.7–10.4)
CHLORIDE SERPL-SCNC: 107 MMOL/L (ref 98–112)
CLARITY UR: CLEAR
CO2 SERPL-SCNC: 27 MMOL/L (ref 21–32)
CREAT BLD-MCNC: 0.92 MG/DL
DEPRECATED RDW RBC AUTO: 43.1 FL (ref 35.1–46.3)
EGFRCR SERPLBLD CKD-EPI 2021: 90 ML/MIN/1.73M2 (ref 60–?)
EOSINOPHIL # BLD AUTO: 0.14 X10(3) UL (ref 0–0.7)
EOSINOPHIL NFR BLD AUTO: 2.7 %
ERYTHROCYTE [DISTWIDTH] IN BLOOD BY AUTOMATED COUNT: 12.9 % (ref 11–15)
FASTING STATUS PATIENT QL REPORTED: YES
GLUCOSE BLD-MCNC: 98 MG/DL (ref 70–99)
GLUCOSE UR-MCNC: NORMAL MG/DL
HCT VFR BLD AUTO: 47 %
HGB BLD-MCNC: 15.5 G/DL
HGB UR QL STRIP.AUTO: NEGATIVE
IMM GRANULOCYTES # BLD AUTO: 0.03 X10(3) UL (ref 0–1)
IMM GRANULOCYTES NFR BLD: 0.6 %
INR BLD: 0.94 (ref 0.8–1.2)
KETONES UR-MCNC: NEGATIVE MG/DL
LEUKOCYTE ESTERASE UR QL STRIP.AUTO: NEGATIVE
LYMPHOCYTES # BLD AUTO: 1.75 X10(3) UL (ref 1–4)
LYMPHOCYTES NFR BLD AUTO: 33.3 %
MCH RBC QN AUTO: 29.9 PG (ref 26–34)
MCHC RBC AUTO-ENTMCNC: 33 G/DL (ref 31–37)
MCV RBC AUTO: 90.6 FL
MONOCYTES # BLD AUTO: 0.43 X10(3) UL (ref 0.1–1)
MONOCYTES NFR BLD AUTO: 8.2 %
MRSA NASAL: NEGATIVE
NEUTROPHILS # BLD AUTO: 2.87 X10 (3) UL (ref 1.5–7.7)
NEUTROPHILS # BLD AUTO: 2.87 X10(3) UL (ref 1.5–7.7)
NEUTROPHILS NFR BLD AUTO: 54.4 %
NITRITE UR QL STRIP.AUTO: NEGATIVE
OSMOLALITY SERPL CALC.SUM OF ELEC: 300 MOSM/KG (ref 275–295)
PH UR: 5.5 [PH] (ref 5–8)
PLATELET # BLD AUTO: 253 10(3)UL (ref 150–450)
POTASSIUM SERPL-SCNC: 4.5 MMOL/L (ref 3.5–5.1)
PROT UR-MCNC: NEGATIVE MG/DL
PROTHROMBIN TIME: 13.1 SECONDS (ref 11.6–14.8)
RBC # BLD AUTO: 5.19 X10(6)UL
SODIUM SERPL-SCNC: 144 MMOL/L (ref 136–145)
SP GR UR STRIP: 1.02 (ref 1–1.03)
STAPH A BY PCR: POSITIVE
TSI SER-ACNC: 2.02 UIU/ML (ref 0.55–4.78)
UROBILINOGEN UR STRIP-ACNC: NORMAL
WBC # BLD AUTO: 5.3 X10(3) UL (ref 4–11)

## 2025-01-17 PROCEDURE — 81003 URINALYSIS AUTO W/O SCOPE: CPT | Performed by: FAMILY MEDICINE

## 2025-01-17 PROCEDURE — 84460 ALANINE AMINO (ALT) (SGPT): CPT

## 2025-01-17 PROCEDURE — 80048 BASIC METABOLIC PNL TOTAL CA: CPT

## 2025-01-17 PROCEDURE — 84450 TRANSFERASE (AST) (SGOT): CPT

## 2025-01-17 PROCEDURE — 85610 PROTHROMBIN TIME: CPT

## 2025-01-17 PROCEDURE — 84443 ASSAY THYROID STIM HORMONE: CPT

## 2025-01-17 PROCEDURE — 85730 THROMBOPLASTIN TIME PARTIAL: CPT

## 2025-01-17 PROCEDURE — 87641 MR-STAPH DNA AMP PROBE: CPT

## 2025-01-17 PROCEDURE — 71046 X-RAY EXAM CHEST 2 VIEWS: CPT | Performed by: FAMILY MEDICINE

## 2025-01-17 PROCEDURE — 87640 STAPH A DNA AMP PROBE: CPT

## 2025-01-17 PROCEDURE — 85025 COMPLETE CBC W/AUTO DIFF WBC: CPT

## 2025-01-17 PROCEDURE — 36415 COLL VENOUS BLD VENIPUNCTURE: CPT

## 2025-01-17 RX ORDER — MUPIROCIN 20 MG/G
OINTMENT TOPICAL
Qty: 1 EACH | Refills: 1 | Status: SHIPPED | OUTPATIENT
Start: 2025-01-17

## 2025-01-17 NOTE — TELEPHONE ENCOUNTER
Positive staph MRSA swab on pre op labs resulted in epic.  Please advise.  Surgery scheduled 01/24.

## 2025-01-17 NOTE — TELEPHONE ENCOUNTER
Nasal swab positive for MRSA please contact patient and advise Bactroban prescription at pharmacy he will need to apply it for 5 days twice daily starting now.

## 2025-01-23 NOTE — DISCHARGE INSTRUCTIONS
HOME INSTRUCTIONS  [facility] HOME CARE INSTRUCTIONS: POST-OP ANESTHESIA  The medication that you received for sedation or general anesthesia can last up to 24 hours. Your judgment and reflexes may be altered, even if you feel like your normal self.      We Recommend:   Do not drive any motor vehicle or bicycle   Avoid mowing the lawn, playing sports, or working with power tools/applicances (power saws, electric knives or mixers)   That you have someone stay with you on your first night home   Do not drink alcohol or take sleeping pills or tranquilizers   Do not sign legal documents within 24 hours of your procedure   If you had a nerve block for your surgery, take extra care not to put any pressure on your arm or hand for 24 hours    It is normal:  For you to have a sore throat if you had a breathing tube during surgery (while you were asleep!). The sore throat should get better within 48 hours. You can gargle with warm salt water (1/2 tsp in 4 oz warm water) or use a throat lozenge for comfort  To feel muscle aches or soreness especially in the abdomen, chest or neck. The achy feeling should go away in the next 24 hours  To feel weak, sleepy or \"wiped out\". Your should start feeling better in the next 24 hours.   To experience mild discomforts such as sore lip or tongue, headache, cramps, gas pains or a bloated feeling in your abdomen.   To experience mild back pain or soreness for a day or two if you had spinal or epidural anesthesia.   If you had laparoscopic surgery, to feel shoulder pain or discomfort on the day of surgery.   For some patients to have nausea after surgery/anesthesia    If you feel nausea or experience vomiting:   Try to move around less.   Eat less than usual or drink only liquids until the next morning   Nausea should resolve in about 24 hours    If you have a problem when you are at home:    Call your surgeons office   Discharge Instructions: After Your Surgery  You’ve just had surgery. During  surgery, you were given medicine called anesthesia to keep you relaxed and free of pain. After surgery, you may have some pain or nausea. This is common. Here are some tips for feeling better and getting well after surgery.   Going home  Your healthcare provider will show you how to take care of yourself when you go home. They'll also answer your questions. Have an adult family member or friend drive you home. For the first 24 hours after your surgery:   Don't drive or use heavy equipment.  Don't make important decisions or sign legal papers.  Take medicines as directed.  Don't drink alcohol.  Have someone stay with you, if needed. They can watch for problems and help keep you safe.  Be sure to go to all follow-up visits with your healthcare provider. And rest after your surgery for as long as your provider tells you to.   Coping with pain  If you have pain after surgery, pain medicine will help you feel better. Take it as directed, before pain becomes severe. Also, ask your healthcare provider or pharmacist about other ways to control pain. This might be with heat, ice, or relaxation. And follow any other instructions your surgeon or nurse gives you.      Stay on schedule with your medicine.     Tips for taking pain medicine  To get the best relief possible, remember these points:   Pain medicines can upset your stomach. Taking them with a little food may help.  Most pain relievers taken by mouth need at least 20 to 30 minutes to start to work.  Don't wait till your pain becomes severe before you take your medicine. Try to time your medicine so that you can take it before starting an activity. This might be before you get dressed, go for a walk, or sit down for dinner.  Constipation is a common side effect of some pain medicines. Call your healthcare provider before taking any medicines such as laxatives or stool softeners to help ease constipation. Also ask if you should skip any foods. Drinking lots of fluids and  eating foods such as fruits and vegetables that are high in fiber can also help. Remember, don't take laxatives unless your surgeon has prescribed them.  Drinking alcohol and taking pain medicine can cause dizziness and slow your breathing. It can even be deadly. Don't drink alcohol while taking pain medicine.  Pain medicine can make you react more slowly to things. Don't drive or run machinery while taking pain medicine.  Your healthcare provider may tell you to take acetaminophen to help ease your pain. Ask them how much you're supposed to take each day. Acetaminophen or other pain relievers may interact with your prescription medicines or other over-the-counter (OTC) medicines. Some prescription medicines have acetaminophen and other ingredients in them. Using both prescription and OTC acetaminophen for pain can cause you to accidentally overdose. Read the labels on your OTC medicines with care. This will help you to clearly know the list of ingredients, how much to take, and any warnings. It may also help you not take too much acetaminophen. If you have questions or don't understand the information, ask your pharmacist or healthcare provider to explain it to you before you take the OTC medicine.   Managing nausea  Some people have an upset stomach (nausea) after surgery. This is often because of anesthesia, pain, or pain medicine, less movement of food in the stomach, or the stress of surgery. These tips will help you handle nausea and eat healthy foods as you get better. If you were on a special food plan before surgery, ask your healthcare provider if you should follow it while you get better. Check with your provider on how your eating should progress. It may depend on the surgery you had. These general tips may help:   Don't push yourself to eat. Your body will tell you when to eat and how much.  Start off with clear liquids and soup. They're easier to digest.  Next try semi-solid foods as you feel ready.  These include mashed potatoes, applesauce, and gelatin.  Slowly move to solid foods. Don’t eat fatty, rich, or spicy foods at first.  Don't force yourself to have 3 large meals a day. Instead eat smaller amounts more often.  Take pain medicines with a small amount of solid food, such as crackers or toast. This helps prevent nausea.  When to call your healthcare provider  Call your healthcare provider right away if you have any of these:   You still have too much pain, or the pain gets worse, after taking the medicine. The medicine may not be strong enough. Or there may be a complication from the surgery.  You feel too sleepy, dizzy, or groggy. The medicine may be too strong.  Side effects such as nausea or vomiting. Your healthcare provider may advise taking other medicines to .  Skin changes such as rash, itching, or hives. This may mean you have an allergic reaction. Your provider may advise taking other medicines.  The incision looks different (for instance, part of it opens up).  Bleeding or fluid leaking from the incision site, and weren't told to expect that.  Fever of 100.4°F (38°C) or higher, or as directed by your provider.  Call 911  Call 911 right away if you have:   Trouble breathing  Facial swelling    If you have obstructive sleep apnea   You were given anesthesia medicine during surgery to keep you comfortable and free of pain. After surgery, you may have more apnea spells because of this medicine and other medicines you were given. The spells may last longer than normal.    At home:  Keep using the continuous positive airway pressure (CPAP) device when you sleep. Unless your healthcare provider tells you not to, use it when you sleep, day or night. CPAP is a common device used to treat obstructive sleep apnea.  Talk with your provider before taking any pain medicine, muscle relaxants, or sedatives. Your provider will tell you about the possible dangers of taking these medicines.  Contact your  provider if your sleeping changes a lot even when taking medicines as directed.  [employer] last reviewed this educational content on 10/1/2021  © 2804-6983 [employer]. All rights reserved. This information is not intended as a substitute for professional medical care. Always follow your healthcare professional's instructions.    Norco or tylenol for pain.  Celebrex for pain/inflammation.  Aspirin 325mg twice daily for 3 weeks for blood clot prevention.  May remove dressing and place new gauze or mepilex over incision in 4 days.  Keep incision clean and dry.  Ice and elevate knee.  Weight bear as tolerated.  Use walker/crutches for comfort.  Follow up with Dr. Lara/Chelsi Villarreal in 2 weeks [phone].     Sometimes managing your health at home requires assistance.  The [facility] team has recognized your preference to use [facility].  They can be reached by phone at [phone].  The fax number for your reference is [phone].  A representative from the home health agency will contact you or your family to schedule your first visit.

## 2025-01-23 NOTE — H&P
[facility]  part of [facility]    History & Physical    Lanre Melchor Patient Status:  Hospital Outpatient Surgery    1955 MRN [id number]   Location [facility] OPERATING ROOM Attending Stephen Lara MD   Hosp Day # 0 PCP Wade Hernandez,      Date:  2025  Date of Admission:  (Not on file)    History provided by:patient  Chief Complaint:   No chief complaint on file.    Right knee pain   HPI:   Lanre Melchor is a(n) 69 year old male. Presents today with complaints of right knee pain.  He said progressive pain in the right knee for a number of years.  He had surgery on the right knee 15 or 20 years ago which was an arthroscopic procedure.  He did wrestle in college.  He has a history of gout.  There is been no recent injury to the knee.  He reports pain when going up down the stairs.  He feels a noise in the knee.  He feels discomfort when walking on uneven surfaces and playing golf.  Majority the pain is over the medial knee.  He reports no swelling no mechanical clicking.  He walks every day and has some pain with walking.     PMHx: COPD, HTN, high cholesterol, hx of elevated troponin, GERD, hypokalemia, TANYA  Allergies: lisinopril (anaphylaxis), pravastatin (pain)  Medications: telmisartan, repatha sureclick     History     Past Medical History:    Arrhythmia    Svt    BCC (basal cell carcinoma)    left forearm proximal    BCC (basal cell carcinoma)    left forearm distal    Hearing impaired person, bilateral    High blood pressure    High cholesterol    Obesity    Other and unspecified hyperlipidemia    Sleep apnea    not using machine    SVT (supraventricular tachycardia) (HCC)    Unspecified essential hypertension     Past Surgical History:   Procedure Laterality Date    Colonoscopy      Colonoscopy N/A 2024    Procedure: COLONOSCOPY;  Surgeon: Kristan Tovar MD;  Location: [facility] ENDOSCOPY    Colonoscopy & polypectomy      Ndsc ablation &  rcnstj [facility] w/o bypass      Other surgical history  02/2017    heart ablation     Other surgical history  08/22/2023    1. Left tympanoplasty, 2. Cartilage graft, 3. Left eustachian tube balloon dilation 4. Facial nerve monitoring    Vasectomy       Family History   Problem Relation Age of Onset    Cancer Father         Brain    Skin cancer Father     Cancer Daughter         sarcoma    Melanoma Brother     Melanoma Brother      Social History:  Social History     Socioeconomic History    Marital status:    Tobacco Use    Smoking status: Former     Passive exposure: Past    Smokeless tobacco: Never    Tobacco comments:     Quit 40 yrs ago   Vaping Use    Vaping status: Never Used   Substance and Sexual Activity    Alcohol use: Yes     Alcohol/week: 4.0 standard drinks of alcohol     Types: 4 Standard drinks or equivalent per week    Drug use: Yes     Types: Cannabis     Comment: gummies to help sleep   Other Topics Concern    Grew up on a farm No    History of tanning Yes    Outdoor occupation No    Reaction to local anesthetic No    Pt has a pacemaker No    Pt has a defibrillator No     Social Drivers of Health     Physical Activity: Sufficiently Active (3/11/2022)    Received from "[employer]    Exercise Vital Sign     Days of Exercise per Week: 5 days     Minutes of Exercise per Session: 50 min     Allergies/Medications:   Allergies: Allergies[1]  No medications prior to admission.       Review of Systems:   Pertinent items are noted in HPI.    Physical Exam:   Vital Signs:  Height 6' 1\" (1.854 m), weight 297 lb (134.7 kg).     General appearance: alert, appears stated age and cooperative  Extremities: He walks with a slightly antalgic gait on the right. Further exam the right knee reveals a subtle varus deformity. He is tender at the medial joint line. Range of motion is from 5 to 125 degrees. Good varus valgus stability at 30 degrees and full extension. Lachman  sign and posterior drawer negative. No. Passive range of motion of the hip.   Pulses: 2+ and symmetric  Neurologic: Alert and oriented X 3, normal strength and tone. Normal symmetric reflexes. Normal coordination and gait. Light touch and pinprick sensation intact throughout the lower extremities.  Ankle dorsiflexion plantarflexion EHL knee extension and hip flexion strength are 5 out of 5 bilaterally.  No clonus.           Results:     Lab Results   Component Value Date    WBC 5.3 01/17/2025    HGB 15.5 01/17/2025    HCT 47.0 01/17/2025    .0 01/17/2025    CREATSERUM 0.92 01/17/2025    BUN 17 01/17/2025     01/17/2025    K 4.5 01/17/2025     01/17/2025    CO2 27.0 01/17/2025    GLU 98 01/17/2025    CA 9.6 01/17/2025    ALB 4.2 05/22/2024    ALKPHO 73 05/22/2024    BILT 0.6 05/22/2024    TP 6.9 05/22/2024    AST 14 01/17/2025    ALT 16 01/17/2025    PTT 29.2 01/17/2025    INR 0.94 01/17/2025    TSH 2.019 01/17/2025    MG 2.0 12/24/2016    TROP 0.25 (HH) 12/23/2016       No results found.        Assessment/Plan:     * No active hospital problems. *    Assessment: Right knee osteoarthritis, primary, medial compartment     Plan: I discussed operative and nonoperative treatment options.  Valgus stress x-ray obtained in the office today show good preservation of lateral compartment joint space.  There is correction of the varus deformity.  I feel he would be a suitable candidate for unicompartmental arthroplasty.  Risks and benefits of surgery were discussed.  Questions were answered.  No guarantees made.  He may elect for the surgery in the coming weeks or months.  I discussed other forms of nonoperative treatment for the knee.  He has failed all conservative treatments including anti-inflammatory medications therapeutic exercise activity modifications intra-articular injections of cortisone.  He would need medical and dental evaluation prior to surgery.  Follow-up on the date of the procedure.       Follow Up: Return if symptoms worsen or fail to improve.    Chelsi Villarreal PA-C  1/23/2025         [1]   Allergies  Allergen Reactions    Lisinopril ANAPHYLAXIS    Pravastatin PAIN     Severe joint pain.

## 2025-01-24 ENCOUNTER — APPOINTMENT (OUTPATIENT)
Dept: GENERAL RADIOLOGY | Facility: HOSPITAL | Age: 70
End: 2025-01-24
Attending: ORTHOPAEDIC SURGERY
Payer: COMMERCIAL

## 2025-01-24 ENCOUNTER — HOSPITAL ENCOUNTER (OUTPATIENT)
Facility: HOSPITAL | Age: 70
Discharge: HOME HEALTH | End: 2025-01-25
Attending: ORTHOPAEDIC SURGERY | Admitting: ORTHOPAEDIC SURGERY
Payer: COMMERCIAL

## 2025-01-24 ENCOUNTER — ANESTHESIA EVENT (OUTPATIENT)
Dept: SURGERY | Facility: HOSPITAL | Age: 70
End: 2025-01-24
Payer: COMMERCIAL

## 2025-01-24 ENCOUNTER — ANESTHESIA (OUTPATIENT)
Dept: SURGERY | Facility: HOSPITAL | Age: 70
End: 2025-01-24
Payer: COMMERCIAL

## 2025-01-24 DIAGNOSIS — M17.11 PRIMARY OSTEOARTHRITIS OF RIGHT KNEE: Primary | ICD-10-CM

## 2025-01-24 PROCEDURE — 3078F DIAST BP <80 MM HG: CPT | Performed by: HOSPITALIST

## 2025-01-24 PROCEDURE — 3008F BODY MASS INDEX DOCD: CPT | Performed by: HOSPITALIST

## 2025-01-24 PROCEDURE — 73560 X-RAY EXAM OF KNEE 1 OR 2: CPT | Performed by: ORTHOPAEDIC SURGERY

## 2025-01-24 PROCEDURE — 3074F SYST BP LT 130 MM HG: CPT | Performed by: HOSPITALIST

## 2025-01-24 PROCEDURE — 99222 1ST HOSP IP/OBS MODERATE 55: CPT | Performed by: HOSPITALIST

## 2025-01-24 PROCEDURE — 0SRC0L9 REPLACEMENT OF RIGHT KNEE JOINT WITH MEDIAL UNICONDYLAR SYNTHETIC SUBSTITUTE, CEMENTED, OPEN APPROACH: ICD-10-PCS | Performed by: ORTHOPAEDIC SURGERY

## 2025-01-24 DEVICE — IMPLANTABLE DEVICE
Type: IMPLANTABLE DEVICE | Site: KNEE | Status: FUNCTIONAL
Brand: BIOMET® BONE CEMENT R

## 2025-01-24 DEVICE — ANATOMICAL FEMORAL COMPONENT CEMENTED S5 RM
Type: IMPLANTABLE DEVICE | Status: FUNCTIONAL
Brand: MOTO PARTIAL KNEE SYSTEM - MEDIAL

## 2025-01-24 DEVICE — TIBIAL TRAY FIX CEMENTED S6 RM
Type: IMPLANTABLE DEVICE | Status: FUNCTIONAL
Brand: MOTO PARTIAL KNEE SYSTEM - MEDIAL

## 2025-01-24 DEVICE — UNI KNEE CAP PRICING: Type: IMPLANTABLE DEVICE

## 2025-01-24 DEVICE — E-CROSS TIBIAL INSERT FIX S6 RM - 8MM
Type: IMPLANTABLE DEVICE | Status: FUNCTIONAL
Brand: MOTO PARTIAL KNEE SYSTEM

## 2025-01-24 RX ORDER — DIPHENHYDRAMINE HCL 25 MG
25 CAPSULE ORAL EVERY 4 HOURS PRN
Status: DISCONTINUED | OUTPATIENT
Start: 2025-01-24 | End: 2025-01-25

## 2025-01-24 RX ORDER — METOCLOPRAMIDE HYDROCHLORIDE 5 MG/ML
10 INJECTION INTRAMUSCULAR; INTRAVENOUS EVERY 6 HOURS PRN
Status: DISCONTINUED | OUTPATIENT
Start: 2025-01-24 | End: 2025-01-25

## 2025-01-24 RX ORDER — HALOPERIDOL 5 MG/ML
0.5 INJECTION INTRAMUSCULAR ONCE AS NEEDED
Status: DISCONTINUED | OUTPATIENT
Start: 2025-01-24 | End: 2025-01-24

## 2025-01-24 RX ORDER — HYDROMORPHONE HYDROCHLORIDE 1 MG/ML
0.6 INJECTION, SOLUTION INTRAMUSCULAR; INTRAVENOUS; SUBCUTANEOUS
Status: DISCONTINUED | OUTPATIENT
Start: 2025-01-24 | End: 2025-01-25

## 2025-01-24 RX ORDER — TRANEXAMIC ACID 10 MG/ML
INJECTION, SOLUTION INTRAVENOUS AS NEEDED
Status: DISCONTINUED | OUTPATIENT
Start: 2025-01-24 | End: 2025-01-24 | Stop reason: SURG

## 2025-01-24 RX ORDER — SODIUM CHLORIDE, SODIUM LACTATE, POTASSIUM CHLORIDE, CALCIUM CHLORIDE 600; 310; 30; 20 MG/100ML; MG/100ML; MG/100ML; MG/100ML
INJECTION, SOLUTION INTRAVENOUS CONTINUOUS
Status: DISCONTINUED | OUTPATIENT
Start: 2025-01-24 | End: 2025-01-24 | Stop reason: HOSPADM

## 2025-01-24 RX ORDER — MORPHINE SULFATE 1 MG/ML
INJECTION, SOLUTION EPIDURAL; INTRATHECAL; INTRAVENOUS
Status: COMPLETED | OUTPATIENT
Start: 2025-01-24 | End: 2025-01-24

## 2025-01-24 RX ORDER — NALOXONE HYDROCHLORIDE 0.4 MG/ML
0.08 INJECTION, SOLUTION INTRAMUSCULAR; INTRAVENOUS; SUBCUTANEOUS
Status: DISCONTINUED | OUTPATIENT
Start: 2025-01-24 | End: 2025-01-25

## 2025-01-24 RX ORDER — HYDROCODONE BITARTRATE AND ACETAMINOPHEN 7.5; 325 MG/1; MG/1
2 TABLET ORAL EVERY 6 HOURS PRN
Status: DISCONTINUED | OUTPATIENT
Start: 2025-01-24 | End: 2025-01-25

## 2025-01-24 RX ORDER — CEFAZOLIN SODIUM IN 0.9 % NACL 3 G/100 ML
3 INTRAVENOUS SOLUTION, PIGGYBACK (ML) INTRAVENOUS EVERY 8 HOURS
Status: COMPLETED | OUTPATIENT
Start: 2025-01-24 | End: 2025-01-25

## 2025-01-24 RX ORDER — POLYETHYLENE GLYCOL 3350 17 G/17G
17 POWDER, FOR SOLUTION ORAL DAILY PRN
Status: DISCONTINUED | OUTPATIENT
Start: 2025-01-24 | End: 2025-01-25

## 2025-01-24 RX ORDER — BISACODYL 10 MG
10 SUPPOSITORY, RECTAL RECTAL
Status: DISCONTINUED | OUTPATIENT
Start: 2025-01-24 | End: 2025-01-25

## 2025-01-24 RX ORDER — FAMOTIDINE 10 MG/ML
20 INJECTION, SOLUTION INTRAVENOUS ONCE
Status: COMPLETED | OUTPATIENT
Start: 2025-01-24 | End: 2025-01-24

## 2025-01-24 RX ORDER — SODIUM CHLORIDE, SODIUM LACTATE, POTASSIUM CHLORIDE, CALCIUM CHLORIDE 600; 310; 30; 20 MG/100ML; MG/100ML; MG/100ML; MG/100ML
INJECTION, SOLUTION INTRAVENOUS CONTINUOUS
Status: DISCONTINUED | OUTPATIENT
Start: 2025-01-24 | End: 2025-01-25

## 2025-01-24 RX ORDER — MORPHINE SULFATE 2 MG/ML
2 INJECTION, SOLUTION INTRAMUSCULAR; INTRAVENOUS EVERY 2 HOUR PRN
Status: DISCONTINUED | OUTPATIENT
Start: 2025-01-24 | End: 2025-01-25

## 2025-01-24 RX ORDER — ASPIRIN 325 MG
325 TABLET, DELAYED RELEASE (ENTERIC COATED) ORAL ONCE
Status: COMPLETED | OUTPATIENT
Start: 2025-01-24 | End: 2025-01-24

## 2025-01-24 RX ORDER — ACETAMINOPHEN 325 MG/1
650 TABLET ORAL EVERY 6 HOURS PRN
Status: DISCONTINUED | OUTPATIENT
Start: 2025-01-24 | End: 2025-01-25

## 2025-01-24 RX ORDER — HYDROMORPHONE HYDROCHLORIDE 1 MG/ML
0.4 INJECTION, SOLUTION INTRAMUSCULAR; INTRAVENOUS; SUBCUTANEOUS EVERY 5 MIN PRN
Status: DISCONTINUED | OUTPATIENT
Start: 2025-01-24 | End: 2025-01-24 | Stop reason: HOSPADM

## 2025-01-24 RX ORDER — FAMOTIDINE 20 MG/1
20 TABLET, FILM COATED ORAL ONCE
Status: COMPLETED | OUTPATIENT
Start: 2025-01-24 | End: 2025-01-24

## 2025-01-24 RX ORDER — ONDANSETRON 2 MG/ML
4 INJECTION INTRAMUSCULAR; INTRAVENOUS ONCE AS NEEDED
Status: DISCONTINUED | OUTPATIENT
Start: 2025-01-24 | End: 2025-01-24

## 2025-01-24 RX ORDER — METOCLOPRAMIDE 10 MG/1
10 TABLET ORAL ONCE
Status: COMPLETED | OUTPATIENT
Start: 2025-01-24 | End: 2025-01-24

## 2025-01-24 RX ORDER — PROCHLORPERAZINE EDISYLATE 5 MG/ML
5 INJECTION INTRAMUSCULAR; INTRAVENOUS ONCE AS NEEDED
Status: DISCONTINUED | OUTPATIENT
Start: 2025-01-24 | End: 2025-01-24

## 2025-01-24 RX ORDER — CELECOXIB 200 MG/1
200 CAPSULE ORAL 2 TIMES DAILY
Status: DISCONTINUED | OUTPATIENT
Start: 2025-01-24 | End: 2025-01-25

## 2025-01-24 RX ORDER — DIPHENHYDRAMINE HYDROCHLORIDE 50 MG/ML
12.5 INJECTION INTRAMUSCULAR; INTRAVENOUS EVERY 4 HOURS PRN
Status: DISCONTINUED | OUTPATIENT
Start: 2025-01-24 | End: 2025-01-25

## 2025-01-24 RX ORDER — METOCLOPRAMIDE HYDROCHLORIDE 5 MG/ML
10 INJECTION INTRAMUSCULAR; INTRAVENOUS ONCE
Status: COMPLETED | OUTPATIENT
Start: 2025-01-24 | End: 2025-01-24

## 2025-01-24 RX ORDER — NALOXONE HYDROCHLORIDE 0.4 MG/ML
0.08 INJECTION, SOLUTION INTRAMUSCULAR; INTRAVENOUS; SUBCUTANEOUS AS NEEDED
Status: DISCONTINUED | OUTPATIENT
Start: 2025-01-24 | End: 2025-01-24 | Stop reason: HOSPADM

## 2025-01-24 RX ORDER — NALBUPHINE HYDROCHLORIDE 10 MG/ML
2.5 INJECTION INTRAMUSCULAR; INTRAVENOUS; SUBCUTANEOUS EVERY 4 HOURS PRN
Status: DISCONTINUED | OUTPATIENT
Start: 2025-01-24 | End: 2025-01-25

## 2025-01-24 RX ORDER — SENNOSIDES 8.6 MG
17.2 TABLET ORAL NIGHTLY
Status: DISCONTINUED | OUTPATIENT
Start: 2025-01-24 | End: 2025-01-25

## 2025-01-24 RX ORDER — MORPHINE SULFATE 10 MG/ML
6 INJECTION, SOLUTION INTRAMUSCULAR; INTRAVENOUS EVERY 10 MIN PRN
Status: DISCONTINUED | OUTPATIENT
Start: 2025-01-24 | End: 2025-01-24 | Stop reason: HOSPADM

## 2025-01-24 RX ORDER — METOCLOPRAMIDE HYDROCHLORIDE 5 MG/ML
10 INJECTION INTRAMUSCULAR; INTRAVENOUS EVERY 8 HOURS PRN
Status: DISCONTINUED | OUTPATIENT
Start: 2025-01-24 | End: 2025-01-24

## 2025-01-24 RX ORDER — MORPHINE SULFATE 2 MG/ML
1 INJECTION, SOLUTION INTRAMUSCULAR; INTRAVENOUS EVERY 2 HOUR PRN
Status: DISCONTINUED | OUTPATIENT
Start: 2025-01-24 | End: 2025-01-25

## 2025-01-24 RX ORDER — MORPHINE SULFATE 4 MG/ML
4 INJECTION, SOLUTION INTRAMUSCULAR; INTRAVENOUS EVERY 2 HOUR PRN
Status: DISCONTINUED | OUTPATIENT
Start: 2025-01-24 | End: 2025-01-25

## 2025-01-24 RX ORDER — HYDROMORPHONE HYDROCHLORIDE 1 MG/ML
0.4 INJECTION, SOLUTION INTRAMUSCULAR; INTRAVENOUS; SUBCUTANEOUS
Status: DISCONTINUED | OUTPATIENT
Start: 2025-01-24 | End: 2025-01-25

## 2025-01-24 RX ORDER — MORPHINE SULFATE 4 MG/ML
2 INJECTION, SOLUTION INTRAMUSCULAR; INTRAVENOUS EVERY 10 MIN PRN
Status: DISCONTINUED | OUTPATIENT
Start: 2025-01-24 | End: 2025-01-24 | Stop reason: HOSPADM

## 2025-01-24 RX ORDER — CEFAZOLIN SODIUM IN 0.9 % NACL 3 G/100 ML
3 INTRAVENOUS SOLUTION, PIGGYBACK (ML) INTRAVENOUS ONCE
Status: COMPLETED | OUTPATIENT
Start: 2025-01-24 | End: 2025-01-24

## 2025-01-24 RX ORDER — DOCUSATE SODIUM 100 MG/1
100 CAPSULE, LIQUID FILLED ORAL 2 TIMES DAILY
Status: DISCONTINUED | OUTPATIENT
Start: 2025-01-24 | End: 2025-01-25

## 2025-01-24 RX ORDER — HYDROCODONE BITARTRATE AND ACETAMINOPHEN 7.5; 325 MG/1; MG/1
1 TABLET ORAL EVERY 4 HOURS PRN
Status: DISCONTINUED | OUTPATIENT
Start: 2025-01-24 | End: 2025-01-25

## 2025-01-24 RX ORDER — LIDOCAINE HYDROCHLORIDE 10 MG/ML
INJECTION, SOLUTION INFILTRATION; PERINEURAL
Status: COMPLETED | OUTPATIENT
Start: 2025-01-24 | End: 2025-01-24

## 2025-01-24 RX ORDER — LIDOCAINE HYDROCHLORIDE 10 MG/ML
INJECTION, SOLUTION EPIDURAL; INFILTRATION; INTRACAUDAL; PERINEURAL AS NEEDED
Status: DISCONTINUED | OUTPATIENT
Start: 2025-01-24 | End: 2025-01-24 | Stop reason: SURG

## 2025-01-24 RX ORDER — MIDAZOLAM HYDROCHLORIDE 1 MG/ML
INJECTION INTRAMUSCULAR; INTRAVENOUS AS NEEDED
Status: DISCONTINUED | OUTPATIENT
Start: 2025-01-24 | End: 2025-01-24 | Stop reason: SURG

## 2025-01-24 RX ORDER — OXYCODONE HCL 10 MG/1
10 TABLET, FILM COATED, EXTENDED RELEASE ORAL EVERY 12 HOURS
Status: DISCONTINUED | OUTPATIENT
Start: 2025-01-24 | End: 2025-01-25

## 2025-01-24 RX ORDER — OXYCODONE HCL 10 MG/1
10 TABLET, FILM COATED, EXTENDED RELEASE ORAL EVERY 8 HOURS
Status: COMPLETED | OUTPATIENT
Start: 2025-01-24 | End: 2025-01-24

## 2025-01-24 RX ORDER — HYDROMORPHONE HYDROCHLORIDE 1 MG/ML
0.2 INJECTION, SOLUTION INTRAMUSCULAR; INTRAVENOUS; SUBCUTANEOUS EVERY 5 MIN PRN
Status: DISCONTINUED | OUTPATIENT
Start: 2025-01-24 | End: 2025-01-24 | Stop reason: HOSPADM

## 2025-01-24 RX ORDER — ONDANSETRON 2 MG/ML
4 INJECTION INTRAMUSCULAR; INTRAVENOUS EVERY 6 HOURS PRN
Status: DISCONTINUED | OUTPATIENT
Start: 2025-01-24 | End: 2025-01-25

## 2025-01-24 RX ORDER — ACETAMINOPHEN 500 MG
1000 TABLET ORAL ONCE
Status: COMPLETED | OUTPATIENT
Start: 2025-01-24 | End: 2025-01-24

## 2025-01-24 RX ORDER — SODIUM PHOSPHATE, DIBASIC AND SODIUM PHOSPHATE, MONOBASIC 7; 19 G/230ML; G/230ML
1 ENEMA RECTAL ONCE AS NEEDED
Status: DISCONTINUED | OUTPATIENT
Start: 2025-01-24 | End: 2025-01-25

## 2025-01-24 RX ORDER — CELECOXIB 200 MG/1
200 CAPSULE ORAL
Status: COMPLETED | OUTPATIENT
Start: 2025-01-24 | End: 2025-01-24

## 2025-01-24 RX ORDER — BUPIVACAINE HYDROCHLORIDE 7.5 MG/ML
INJECTION, SOLUTION INTRASPINAL
Status: COMPLETED | OUTPATIENT
Start: 2025-01-24 | End: 2025-01-24

## 2025-01-24 RX ORDER — LOSARTAN POTASSIUM 100 MG/1
100 TABLET ORAL DAILY
Status: DISCONTINUED | OUTPATIENT
Start: 2025-01-25 | End: 2025-01-25

## 2025-01-24 RX ORDER — MORPHINE SULFATE 4 MG/ML
4 INJECTION, SOLUTION INTRAMUSCULAR; INTRAVENOUS EVERY 10 MIN PRN
Status: DISCONTINUED | OUTPATIENT
Start: 2025-01-24 | End: 2025-01-24 | Stop reason: HOSPADM

## 2025-01-24 RX ORDER — HYDROMORPHONE HYDROCHLORIDE 1 MG/ML
0.6 INJECTION, SOLUTION INTRAMUSCULAR; INTRAVENOUS; SUBCUTANEOUS EVERY 5 MIN PRN
Status: DISCONTINUED | OUTPATIENT
Start: 2025-01-24 | End: 2025-01-24 | Stop reason: HOSPADM

## 2025-01-24 RX ORDER — ASPIRIN 325 MG
325 TABLET ORAL 2 TIMES DAILY
Status: DISCONTINUED | OUTPATIENT
Start: 2025-01-24 | End: 2025-01-25

## 2025-01-24 RX ORDER — HYDROCODONE BITARTRATE AND ACETAMINOPHEN 7.5; 325 MG/1; MG/1
1 TABLET ORAL EVERY 6 HOURS PRN
Status: DISCONTINUED | OUTPATIENT
Start: 2025-01-24 | End: 2025-01-25

## 2025-01-24 RX ADMIN — SODIUM CHLORIDE, SODIUM LACTATE, POTASSIUM CHLORIDE, CALCIUM CHLORIDE: 600; 310; 30; 20 INJECTION, SOLUTION INTRAVENOUS at 12:42:00

## 2025-01-24 RX ADMIN — BUPIVACAINE HYDROCHLORIDE 1.7 ML: 7.5 INJECTION, SOLUTION INTRASPINAL at 12:05:00

## 2025-01-24 RX ADMIN — SODIUM CHLORIDE, SODIUM LACTATE, POTASSIUM CHLORIDE, CALCIUM CHLORIDE: 600; 310; 30; 20 INJECTION, SOLUTION INTRAVENOUS at 12:01:00

## 2025-01-24 RX ADMIN — LIDOCAINE HYDROCHLORIDE 1 ML: 10 INJECTION, SOLUTION INFILTRATION; PERINEURAL at 12:05:00

## 2025-01-24 RX ADMIN — CEFAZOLIN SODIUM IN 0.9 % NACL 3 G: 3 G/100 ML INTRAVENOUS SOLUTION, PIGGYBACK (ML) INTRAVENOUS at 12:15:00

## 2025-01-24 RX ADMIN — MORPHINE SULFATE 0.3 MG: 1 INJECTION, SOLUTION EPIDURAL; INTRATHECAL; INTRAVENOUS at 12:05:00

## 2025-01-24 RX ADMIN — MIDAZOLAM HYDROCHLORIDE 2 MG: 1 INJECTION INTRAMUSCULAR; INTRAVENOUS at 12:01:00

## 2025-01-24 RX ADMIN — LIDOCAINE HYDROCHLORIDE 50 MG: 10 INJECTION, SOLUTION EPIDURAL; INFILTRATION; INTRACAUDAL; PERINEURAL at 12:01:00

## 2025-01-24 RX ADMIN — TRANEXAMIC ACID 1000 MG: 10 INJECTION, SOLUTION INTRAVENOUS at 12:18:00

## 2025-01-24 NOTE — OPERATIVE REPORT
Operative Note    Patient Name: Lanre Melchor    Preoperative Diagnosis: Primary osteoarthritis of right knee [M17.11]    Postoperative Diagnosis: Primary osteoarthritis of right knee [M17.11]    Primary Surgeon: KISHORE YOUSSEF MD     Assistant: Durga Villarreal    Procedures: R knee unicompartmental arthroplasty    Surgical Findings: above    Anesthesia: Spinal    Complications: none    Specimen: R knee bone and soft tissue to pathology    Drains: key    Condition: stable to RR    Estimated Blood Loss: 50cc    KISHORE YOUSSEF MD

## 2025-01-24 NOTE — ANESTHESIA PROCEDURE NOTES
Spinal Block    Date/Time: 1/24/2025 12:05 PM    Performed by: Peter Cedeño MD  Authorized by: Peter Cedeño MD      General Information and Staff    Start Time:  1/24/2025 12:05 PM  End Time:  1/24/2025 12:09 PM  Anesthesiologist:  Peter Cedeño MD  Performed by:  Anesthesiologist  Patient Location:  OR  Site identification: surface landmarks    Reason for Block: at surgeon's request, post-op pain management and surgical anesthesia    Preanesthetic Checklist: patient identified, IV checked, risks and benefits discussed, monitors and equipment checked, pre-op evaluation, timeout performed, anesthesia consent and sterile technique used      Procedure Details    Patient Position:  Sitting  Prep: ChloraPrep    Monitoring:  Cardiac monitor, heart rate and continuous pulse ox  Approach:  Midline  Location:  L3-4  Injection Technique:  Single-shot    Needle    Needle Type:  Sprotte  Needle Gauge:  24 G  Needle Length:  3.5 in    Assessment    Sensory Level:   Events: clear CSF, CSF aspirated, well tolerated and blood negative      Additional Comments     Atraumatic, CSF aspirated, slow injection of meds

## 2025-01-24 NOTE — CONSULTS
[facility]    PATIENT'S NAME: MARY MAGUIRE   ATTENDING PHYSICIAN: Stephen Lara MD   CONSULTING PHYSICIAN: Lionel Prasad MD   PATIENT ACCOUNT#:   [id number]    LOCATION:   Room 5 A [facility]  MEDICAL RECORD #:   [id number]       YOB: 1955  ADMISSION DATE:       01/24/2025      CONSULT DATE:  01/24/2025    REPORT OF CONSULTATION      REASON FOR ADMISSION:  Post right medial knee unicompartmental arthroplasty.     HISTORY OF PRESENT ILLNESS:  Patient is a 69-year-old  male with chronic right knee pain and underlying severe medial unicompartment osteoarthritis, failed outpatient conservative medical management options, scheduled today for above-mentioned procedure by his orthopedic surgeon, Dr. Lara.  Preoperatively, he had spinal block.  Postoperatively, transferred to PACU for further monitoring.    PAST MEDICAL HISTORY:  Generalized osteoarthritis, obesity, obstructive sleep apnea, hypertension, hyperlipidemia, gout, supraventricular tachycardia, status post ablation.     PAST SURGICAL HISTORY:  Skin basal cell carcinoma resection, vasectomy, left tympanoplasty.     MEDICATIONS:  Please see medication reconciliation list.      ALLERGIES:  Lisinopril.  Side effects to pravastatin.     SOCIAL HISTORY:  Ex-tobacco user.  Social alcohol.  No drug use.  Independent in his basic activities of daily living.     FAMILY HISTORY:  Father had brain cancer.    REVIEW OF SYSTEMS:  Currently resting in bed, no right knee pain.  No chest pain.  No shortness of breath.  Other 12-point review of systems is negative.        PHYSICAL EXAMINATION:    GENERAL:  Alert, oriented to time, place, and person.  No acute distress.   VITAL SIGNS:  Temperature 97.4, pulse 54, respiratory rate 11, blood pressure 135/77, pulse ox 98% on room air.    HEENT:  Atraumatic.  Oropharynx clear.  Moist mucous membranes.  Ears, nose normal.  Eyes:  Anicteric sclerae.    NECK:  Supple.  No lymphadenopathy.   Trachea midline.  Full range of motion.    LUNGS:  Clear to auscultation bilaterally.  Normal respiratory effort.   HEART:  Regular rate and rhythm.  S1, S2 auscultated.  No murmur.    ABDOMEN:  Soft, nondistended.  No tenderness.  Positive bowel sounds.    EXTREMITIES:  Right knee dressing.  No leg edema, clubbing, or cyanosis.   NEUROLOGIC:  Decreased sensation and muscle movement of both lower extremities post spinal block.  No other focal findings.    ASSESSMENT AND PLAN:    1.   Right knee medial compartment severe osteoarthritis, status post right knee medial unicompartment partial arthroplasty.  Spinal block.  Pain control.  Neurovascular checks.  Aspirin for DVT prophylaxis.  Physical and occupational therapy.  2.   Essential hypertension.  Continue home medication and monitor.  3.   Obstructive sleep apnea.  Apply obstructive sleep apnea protocol and monitor.    Dictated By Lionel Prasad MD  d: 01/24/2025 14:57:50  t: 01/24/2025 15:37:11  Job [id number]  FB/

## 2025-01-24 NOTE — PHYSICAL THERAPY NOTE
PT orders received and chart reviewed. Communicated with RN who reports that patient nauseous and vomiting post-op. Will re-schedule PT evaluation for tomorrow.     Jenna Haase, PT, DPT  [facility]  Ext: [phone]

## 2025-01-24 NOTE — ANESTHESIA PREPROCEDURE EVALUATION
Anesthesia PreOp Note    HPI:     Lanre Melchor is a 69 year old male who presents for preoperative consultation requested by: Stephen Lara MD    Date of Surgery: 1/24/2025    Procedure(s):  Right knee unicompartmental arthroplasty  Indication: Primary osteoarthritis of right knee [M17.11]    Relevant Problems   No relevant active problems       NPO:  Last Liquid Consumption Date: 01/24/25  Last Liquid Consumption Time: 0600  Last Solid Consumption Date: 01/23/25  Last Solid Consumption Time: 2300  Last Liquid Consumption Date: 01/24/25          History Review:  Patient Active Problem List    Diagnosis Date Noted    Tympanic membrane central perforation, 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    Mixed conductive and sensorineural hearing loss of left ear with restricted hearing of right ear 08/22/2023    ETD (Eustachian tube dysfunction), 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    Chronic obstructive pulmonary disease, unspecified (HCC) 04/14/2023    Degenerative disc disease, cervical 06/16/2022    History of cardiac arrhythmia 02/10/2020    Diverticulosis 06/13/2019    High cholesterol 06/13/2019    Cough 02/27/2018    Chronic GERD 02/27/2018    Essential hypertension with goal blood pressure less than 130/80 08/31/2017    TANYA (obstructive sleep apnea) 08/31/2017    Acute gout involving toe of left foot 08/31/2017    Hypokalemia 12/23/2016    Elevated troponin 12/23/2016    Arthralgia of left hip 01/22/2016       Past Medical History:    Arrhythmia    Svt    BCC (basal cell carcinoma)    left forearm proximal    BCC (basal cell carcinoma)    left forearm distal    Hearing impaired person, bilateral    High blood pressure    High cholesterol    Obesity    Other and unspecified hyperlipidemia    Sleep apnea    not using machine    SVT (supraventricular tachycardia) (HCC)    Unspecified essential hypertension       Past Surgical History:   Procedure Laterality Date    Colonoscopy      Colonoscopy N/A 07/29/2024    Procedure: COLONOSCOPY;  Surgeon:  Kristan Tovar MD;  Location: [facility] ENDOSCOPY    Colonoscopy & polypectomy      Ndsc ablation & rcnstj [facility] w/o bypass      Other surgical history  02/2017    heart ablation     Other surgical history  08/22/2023    1. Left tympanoplasty, 2. Cartilage graft, 3. Left eustachian tube balloon dilation 4. Facial nerve monitoring    Vasectomy         Prescriptions Prior to Admission[1]  Current Medications and Prescriptions Ordered in Epic[2]    Allergies[3]    Family History   Problem Relation Age of Onset    Cancer Father         Brain    Skin cancer Father     Cancer Daughter         sarcoma    Melanoma Brother     Melanoma Brother      Social History     Socioeconomic History    Marital status:    Tobacco Use    Smoking status: Former     Passive exposure: Past    Smokeless tobacco: Never    Tobacco comments:     Quit 40 yrs ago   Vaping Use    Vaping status: Never Used   Substance and Sexual Activity    Alcohol use: Yes     Alcohol/week: 4.0 standard drinks of alcohol     Types: 4 Standard drinks or equivalent per week    Drug use: Yes     Types: Cannabis     Comment: gummies to help sleep   Other Topics Concern    Grew up on a farm No    History of tanning Yes    Outdoor occupation No    Reaction to local anesthetic No    Pt has a pacemaker No    Pt has a defibrillator No       Available pre-op labs reviewed.  Lab Results   Component Value Date    WBC 5.3 01/17/2025    RBC 5.19 01/17/2025    HGB 15.5 01/17/2025    HCT 47.0 01/17/2025    MCV 90.6 01/17/2025    MCH 29.9 01/17/2025    MCHC 33.0 01/17/2025    RDW 12.9 01/17/2025    .0 01/17/2025     Lab Results   Component Value Date     01/17/2025    K 4.5 01/17/2025     01/17/2025    CO2 27.0 01/17/2025    BUN 17 01/17/2025    CREATSERUM 0.92 01/17/2025    GLU 98 01/17/2025    CA 9.6 01/17/2025     Lab Results   Component Value Date    INR 0.94 01/17/2025       Vital Signs:  Body mass index is 38.92 kg/m².   height is 1.854 m (6' 1\") and  weight is 133.8 kg (295 lb). His oral temperature is 98.5 °F (36.9 °C). His blood pressure is 142/77 and his pulse is 63. His respiration is 16 and oxygen saturation is 96%.   Vitals:    01/21/25 0949 01/24/25 1109   BP:  142/77   Pulse:  63   Resp:  16   Temp:  98.5 °F (36.9 °C)   TempSrc:  Oral   SpO2:  96%   Weight: 134.7 kg (297 lb) 133.8 kg (295 lb)   Height: 1.854 m (6' 1\")         Anesthesia Evaluation     Patient summary reviewed and Nursing notes reviewed    No history of anesthetic complications   Airway   Mallampati: III  TM distance: >3 FB  Neck ROM: full  Dental - Dentition appears grossly intact     Pulmonary - normal exam   (+) COPD, sleep apnea  Cardiovascular - normal exam  (+) hypertension    ROS comment: 1. Cardiac risk assessment-he does not have a history of significant coronary artery disease, heart failure, CVA, TIA, DVT or pulmonary embolism. History of mild mid LAD disease on a coronary CTA in August 2024. He has normal ejection fraction on echocardiogram from July 2024. Reports good functional capacity only limitation is walking downstairs here on uneven surface because of his right knee. He may proceed with low cardiac risk without cardiac testing. Reports that he can walk up 2 flights of stairs without chest pain or shortness of breath exertion.  2. Coronary artery disease-25 to 49% mid LAD on a coronary CTA 5 months ago. No symptoms of ischemia. He will follow-up with Dr. Andrews in May as scheduled.  3. Hyperlipidemia-he is statin intolerant. LDL well-controlled on Repatha injections.  4. Hypertension-blood pressure controlled on telmisartan.  5. History of SVT-status post ablation with no further reports of tachycardia. Follow-up with Dr. Pepe in July as scheduled.Plan:  Cardiac risk assessment-low cardiac risk for right knee unicompartmental arthroplasty 1/24/2025 Dr. Everardo Lara. May proceed without cardiac testing.  Call with ques       Neuro/Psych - negative ROS      GI/Hepatic/Renal    (+) GERD    Endo/Other - negative ROS   Abdominal                  Anesthesia Plan:   ASA:  3  Plan:   Spinal  Post-op Pain Management: Intrathecal narcotics  Plan Comments: , no blood thinners - proceed with duramorph spinal  Informed Consent Plan and Risks Discussed With:  Patient      I have informed Lanre Melchor and/or legal guardian or family member of the nature of the anesthetic plan, benefits, risks including possible dental damage if relevant, major complications, and any alternative forms of anesthetic management.   All of the patient's questions were answered to the best of my ability. The patient desires the anesthetic management as planned.  Peter Cedeño MD  2025 11:25 AM  Present on Admission:  **None**           [1]   Facility-Administered Medications Prior to Admission   Medication Dose Route Frequency Provider Last Rate Last Admin    triamcinolone acetonide (Kenalog-40) 40 MG/ML injection 40 mg  40 mg Intra-articular Once          Medications Prior to Admission   Medication Sig Dispense Refill Last Dose/Taking    mupirocin 2 % External Ointment Apply to the naris 2 times daily for 5 days. 1 each 1 2025    Meloxicam 15 MG Oral Tab Take 1 tablet (15 mg total) by mouth daily. With meals. (pain/inflammation). 30 tablet 1 2025    REPATHA SURECLICK 140 MG/ML Subcutaneous Solution Auto-injector INJECT 140MG SUBCUTANEOUSLY ONCE EVERY 2 WEEKS.   2025    telmisartan 80 MG Oral Tab Take 1 tablet (80 mg total) by mouth daily. 90 tablet 3 2025    [] albuterol 108 (90 Base) MCG/ACT Inhalation Aero Soln Inhale 2 puffs into the lungs every 4 (four) hours as needed for Wheezing. 1 each 0 More than a month   [2]   Current Facility-Administered Medications Ordered in Epic   Medication Dose Route Frequency Provider Last Rate Last Admin    lactated ringers infusion   Intravenous Continuous Stephen Lara MD 20 mL/hr at 25 1121 New  Bag at 01/24/25 1121    ceFAZolin (Ancef) 3 g in sodium chloride 0.9% 100mL IVPB premix  3 g Intravenous Once OStephen Buchanan MD         No current Epic-ordered outpatient medications on file.   [3]   Allergies  Allergen Reactions    Lisinopril ANAPHYLAXIS    Pravastatin PAIN     Severe joint pain.

## 2025-01-24 NOTE — ANESTHESIA POSTPROCEDURE EVALUATION
Patient: Lanre Melchor    Procedure Summary       Date: 01/24/25 Room / Location: [facility] MAIN OR  / [facility] MAIN OR    Anesthesia Start: 1201 Anesthesia Stop: 1409    Procedure: Right knee unicompartmental arthroplasty (Right: Knee) Diagnosis:       Primary osteoarthritis of right knee      (Primary osteoarthritis of right knee [M17.11])    Surgeons: Stephen Lara MD Anesthesiologist: Peter Cedeño MD    Anesthesia Type: spinal ASA Status: 3            Anesthesia Type: spinal    Vitals Value Taken Time   /84 01/24/25 1409   Temp 97.4 01/24/25 1409   Pulse 79 01/24/25 1408   Resp 14 01/24/25 1409   SpO2 93 % 01/24/25 1408   Vitals shown include unfiled device data.    [facility] AN Post Evaluation:   Patient Evaluated in PACU  Patient Participation: complete - patient participated  Level of Consciousness: awake and alert  Pain Management: adequate  Airway Patency:patent  Yes    Nausea/Vomiting: none  Cardiovascular Status: acceptable  Respiratory Status: acceptable and room air  Postoperative Hydration acceptable      Peter Cedeño MD  1/24/2025 2:09 PM

## 2025-01-25 ENCOUNTER — TELEPHONE (OUTPATIENT)
Dept: ORTHOPEDICS CLINIC | Facility: CLINIC | Age: 70
End: 2025-01-25

## 2025-01-25 VITALS
SYSTOLIC BLOOD PRESSURE: 122 MMHG | RESPIRATION RATE: 17 BRPM | HEIGHT: 73 IN | OXYGEN SATURATION: 94 % | WEIGHT: 295 LBS | DIASTOLIC BLOOD PRESSURE: 73 MMHG | TEMPERATURE: 97 F | HEART RATE: 75 BPM | BODY MASS INDEX: 39.1 KG/M2

## 2025-01-25 LAB
ANION GAP SERPL CALC-SCNC: 10 MMOL/L (ref 0–18)
BUN BLD-MCNC: 17 MG/DL (ref 9–23)
BUN/CREAT SERPL: 19.3 (ref 10–20)
CALCIUM BLD-MCNC: 9.3 MG/DL (ref 8.7–10.4)
CHLORIDE SERPL-SCNC: 105 MMOL/L (ref 98–112)
CO2 SERPL-SCNC: 26 MMOL/L (ref 21–32)
CREAT BLD-MCNC: 0.88 MG/DL
EGFRCR SERPLBLD CKD-EPI 2021: 93 ML/MIN/1.73M2 (ref 60–?)
GLUCOSE BLD-MCNC: 104 MG/DL (ref 70–99)
HCT VFR BLD AUTO: 43 %
HGB BLD-MCNC: 14.4 G/DL
OSMOLALITY SERPL CALC.SUM OF ELEC: 294 MOSM/KG (ref 275–295)
POTASSIUM SERPL-SCNC: 4.4 MMOL/L (ref 3.5–5.1)
SODIUM SERPL-SCNC: 141 MMOL/L (ref 136–145)

## 2025-01-25 PROCEDURE — 99214 OFFICE O/P EST MOD 30 MIN: CPT | Performed by: HOSPITALIST

## 2025-01-25 RX ORDER — HYDROCODONE BITARTRATE AND ACETAMINOPHEN 7.5; 325 MG/1; MG/1
1 TABLET ORAL EVERY 4 HOURS PRN
Qty: 20 TABLET | Refills: 0 | Status: SHIPPED | OUTPATIENT
Start: 2025-01-25

## 2025-01-25 RX ORDER — CELECOXIB 200 MG/1
200 CAPSULE ORAL DAILY
Qty: 30 CAPSULE | Refills: 0 | Status: SHIPPED | OUTPATIENT
Start: 2025-01-25

## 2025-01-25 RX ORDER — ALBUTEROL SULFATE 90 UG/1
2 INHALANT RESPIRATORY (INHALATION) EVERY 4 HOURS PRN
Status: SHIPPED | COMMUNITY
Start: 2025-01-25

## 2025-01-25 RX ORDER — POLYETHYLENE GLYCOL 3350 17 G/17G
17 POWDER, FOR SOLUTION ORAL DAILY PRN
Qty: 24 EACH | Refills: 0 | Status: SHIPPED | OUTPATIENT
Start: 2025-01-25

## 2025-01-25 RX ORDER — ASPIRIN 325 MG
325 TABLET ORAL 2 TIMES DAILY
Qty: 42 TABLET | Refills: 0 | Status: SHIPPED | OUTPATIENT
Start: 2025-01-25

## 2025-01-25 NOTE — PROGRESS NOTES
Subjective: No complaints. Pain controlled.  Has not worked with PT yet.    Objective:  Patient Vitals for the past 24 hrs:   BP Temp Temp src Pulse Resp SpO2 Weight   01/25/25 0424 131/86 98.2 °F (36.8 °C) Temporal 75 16 94 % --   01/25/25 0300 -- -- -- 72 -- -- --   01/24/25 2309 (!) 146/92 97.8 °F (36.6 °C) Temporal 67 14 96 % --   01/24/25 2003 (!) 154/96 97.6 °F (36.4 °C) Temporal 62 12 96 % --   01/24/25 1902 -- -- -- 53 -- -- --   01/24/25 1623 -- -- -- 63 -- -- --   01/24/25 1620 137/73 97.9 °F (36.6 °C) Oral 58 16 95 % --   01/24/25 1533 157/85 -- -- 57 -- 97 % --   01/24/25 1511 -- -- Oral 71 16 100 % --   01/24/25 1450 144/82 -- -- (!) 48 12 93 % --   01/24/25 1440 135/77 97.6 °F (36.4 °C) -- 54 11 98 % --   01/24/25 1430 125/74 -- -- 56 12 99 % --   01/24/25 1420 133/77 -- -- 61 16 93 % --   01/24/25 1410 128/84 97.4 °F (36.3 °C) -- 74 19 93 % --   01/24/25 1109 142/77 98.5 °F (36.9 °C) Oral 63 16 96 % 295 lb (133.8 kg)     Sitting in chair in no acute distress.   Dressing is clean, dry, and intact.  Foot light touch sensation is intact.  Ankle dorsiflexion strength is 5/5. Ankle plantarflexion strength is 5/5.  No calf tenderness.  No calf swelling.  Gi's sign is negative.   The lower/upper extremity is neurovascularly intact.   The lower/upper extremity compartments are supple and non-tender.   Lab Results   Component Value Date    WBC 5.3 01/17/2025    RBC 5.19 01/17/2025    HGB 14.4 01/25/2025    MCV 90.6 01/17/2025    MCH 29.9 01/17/2025    MCHC 33.0 01/17/2025    RDW 12.9 01/17/2025    MPV 8.5 02/21/2017     Lab Results   Component Value Date    INR 0.94 01/17/2025       Assessment:  Postoperative day # 1  S/p R knee unicompartmental arthroplasty    Plan:      Continue pain control.  Continue DVT prophylaxis.  Continue PT/OT.  D/C planning:  home with home health care today.    KISHORE YOUSSEF MD

## 2025-01-25 NOTE — TELEPHONE ENCOUNTER
Post-op call for Dr. Lara    Date: 1/25/2025      Time: 12:15 PM   Patient: Lanre Melchor      number: [id number]   Surgery and surgery date:1/24/2025      Procedure Laterality Anesthesia   Right knee unicompartmental arthroplasty          Patient unavailable.  Left message on voicemail to call clinic with questions or concerns.  Number was provided/ SPOKE TO THE PATIENT AND HIS WIFE.  Patient's general feeling since discharge:The knee is sore  Pain control (0-10):/8  Pain Medication:  dose/strength/  Medication Quantity Refills Start End   aspirin 325 MG Oral Tab 42 tablet 0 1/25/2025 --   Sig:   Take 1 tablet (325 mg total) by mouth 2 (two) times daily.     Route:   Oral       Medication Quantity Refills Start End   celecoxib 200 MG Oral Cap         Medication Quantity Refills Start End   HYDROcodone-acetaminophen 7.5-325 MG Oral Tab 20 tablet 0 1/25/2025 --   Sig:   Take 1 tablet by mouth every 4 (four) hours     In a few days he will try to use tylenol 2-500mg every 8 hours with a maximum of 4000mg in 24 hours including the 325mg in norco alternating drugs  Fever no  Chills: no  SOB: no  Incision site appearance:  Redness   no  Drainage yes no  Clean/dry/Intact yes  Calf pain, redness or warmth:  yes   Bowel Regimen: no LBM on 1/22/2025  If not, what are you taking stool softener/laxative?/ I spoke with his wife and told her to give him fresh fiber fruit with peeling, stool softener and miralax if needed  Confirmed appointment date for post op:2/6/2025  Other concerns patients may ask:We went over ice application protocol under the knee. 20min on 20min off multiple times a day under the knee. If he showers in 3 days wrap the leg with plastic wrap and turn the operative leg away from the shower.  Bathing and bandages   Patient needs to keep incision clean and dry for the first week./DONE  Enforce rest, ice, compression elevation and use their pain medication accordingly./DONE  If the patient needs  more pain medication, please put in a communication.

## 2025-01-25 NOTE — PROGRESS NOTES
[facility]  Progress Note     Lanre Melchor  : 1955    Status: Outpatient in a Bed  Day #: 0    Attending: Beau Ortiz MD  PCP: Wade Hernandez DO     Assessment and Plan:    Right knee medial compartment severe OA  -sp right knee medial unicompartment partial arthroplasty  -pain control  -DVT ppx:  asa  -PT/OT    HTN  -cont meds and monitor    TANYA  -per protocol    Dispo:  discharge home if clears PT/OT        DVT Mechanical Prophylaxis:   SCDs, Early ambuation  DVT Pharmacologic Prophylaxis   Medication   None    DVT Pharmacologic prophylaxis: Aspirin 325 mg          Subjective:      Initial Chief Complaint:  right knee pain    Dizziness and nausea yesterday and a little this morning. Improved currently. No CP, no SOB>     10 point ROS completed and was negative, except for pertinent positive and negatives stated in subjective.      Objective:      Temp:  [97.1 °F (36.2 °C)-98.5 °F (36.9 °C)] 97.1 °F (36.2 °C)  Pulse:  [48-75] 75  Resp:  [11-19] 17  BP: (122-157)/(73-96) 122/73  SpO2:  [93 %-100 %] 94 %  General:  Alert, no distress  HEENT:  Normocephalic, atraumatic  Cardiac:  Regular rate, regular rhythm  Pulmonary:  Clear to auscultation bilaterally, respirations unlabored  Gastrointestinal:  Soft, non-tender, normal bowel sounds  Extremities:  No edema, no cyanosis, no clubbing  Neurologic:  nonfocal  Psychiatric:  Normal affect, calm and appropriate    Intake/Output Summary (Last 24 hours) at 2025 1049  Last data filed at 2025 0426  Gross per 24 hour   Intake 1807 ml   Output 875 ml   Net 932 ml         Recent Labs   Lab 25  0514   HGB 14.4   HCT 43.0     Recent Labs   Lab 25  0514   BUN 17   CREATSERUM 0.88   CA 9.3      K 4.4      CO2 26.0   *       XR KNEE (1 OR 2 VIEWS), RIGHT (CPT=73560)    Result Date: 2025  CONCLUSION:  1. Anatomically aligned right medial compartment knee arthroplasty.  No immediate complication. 2.  Atherosclerotic vascular calcification.    Dictated by (CST): Kale Webber MD on 1/24/2025 at 2:33 PM     Finalized by (CST): Kale Webber MD on 1/24/2025 at 2:34 PM             Medications:   losartan  100 mg Oral Daily    sennosides  17.2 mg Oral Nightly    docusate sodium  100 mg Oral BID    aspirin  325 mg Oral BID    celecoxib  200 mg Oral BID    oxyCODONE ER  10 mg Oral 2 times per day      PRN Meds:   naloxone    acetaminophen    HYDROcodone-acetaminophen    HYDROcodone-acetaminophen    HYDROmorphone    HYDROmorphone    diphenhydrAMINE **OR** diphenhydrAMINE    nalbuphine    sodium chloride    polyethylene glycol (PEG 3350)    magnesium hydroxide    bisacodyl    fleet enema    ondansetron    diphenhydrAMINE **OR** diphenhydrAMINE    HYDROcodone-acetaminophen    morphINE **OR** morphINE **OR** morphINE    metoclopramide    Supplementary Documentation:   DVT Mechanical Prophylaxis:   SCDs, Early ambuation  DVT Pharmacologic Prophylaxis   Medication   None    DVT Pharmacologic prophylaxis: Aspirin 325 mg           Code Status: Prior  Robledo: No urinary catheter in place  Robledo Duration (in days):   Central line:    DOUG: 1/25/2025                    Spent >30 min. Time spent on chart/note review, review of labs/imaging, discussion with patient, physical exam, discussion with staff, consultants, coordinating care, writing progress note, discussion of plan of care.      Beau Ortiz MD

## 2025-01-25 NOTE — DISCHARGE SUMMARY
[facility]  Discharge Summary     Lanre Melchor  : 1955    Status: Outpatient in a Bed  Day #: 0    Attending: Beau Ortiz MD  PCP: Wade Hernandez DO     Date of Admission:  2025  Date of Discharge:  2025     Hospital Discharge Diagnoses:     Right knee medial compartment severe OA  HTN  TANYA      History of Present Illness:     Copied from Admission H&P:    Patient is a 69-year-old  male with chronic right knee pain and underlying severe medial unicompartment osteoarthritis, failed outpatient conservative medical management options, scheduled today for above-mentioned procedure by his orthopedic surgeon, Dr. Lara. Preoperatively, he had spinal block. Postoperatively, transferred to PACU for further monitoring.       Hospital Course:     Patient doing well postop. He had some nausea/dizziness this morning which has improved. Medically stable for discharge home.     Consultants         Provider   Role Specialty     Lionel Prasad MD      Consulting Physician HOSPITALIST          Surgical Procedures       Case IDs Date Procedure Surgeon Location Status    [id number] Right knee unicompartmental arthroplasty Stephen Lara MD [facility] MAIN OR [facility]           Physical Exam:   Blood pressure 122/73, pulse 75, temperature 97.1 °F (36.2 °C), temperature source Temporal, resp. rate 17, height 6' 1\" (1.854 m), weight 295 lb (133.8 kg), SpO2 94%.  General:  Alert, no distress  HEENT:  Normocephalic, atraumatic  Cardiac:  Regular rate, regular rhythm  Pulmonary:  Clear to auscultation bilaterally, respirations unlabored  Gastrointestinal:  Soft, non-tender, normal bowel sounds  Musculoskeletal:  No joint swelling  Extremities:  No edema, no cyanosis, no clubbing  Neurologic:  nonfocal  Psychiatric:  Normal affect, calm and appropriate         Discharge Medications        START taking these medications        Instructions Prescription details   aspirin 325 MG Tabs       Take 1 tablet (325 mg total) by mouth 2 (two) times daily.   Quantity: 42 tablet  Refills: 0     celecoxib 200 MG Caps  Commonly known as: CeleBREX      Take 1 capsule (200 mg total) by mouth daily.   Quantity: 30 capsule  Refills: 0     HYDROcodone-acetaminophen 7.5-325 MG Tabs  Commonly known as: Norco      Take 1 tablet by mouth every 4 (four) hours as needed for Pain.   Quantity: 20 tablet  Refills: 0     Polyethylene Glycol 3350 17 g Pack  Commonly known as: MIRALAX      Take 17 g by mouth daily as needed.   Quantity: 24 each  Refills: 0            CONTINUE taking these medications        Instructions Prescription details   albuterol 108 (90 Base) MCG/ACT Aers  Commonly known as: Ventolin HFA      Inhale 2 puffs into the lungs every 4 (four) hours as needed for Wheezing.   Refills: 0     mupirocin 2 % Oint  Commonly known as: Bactroban      Apply to the naris 2 times daily for 5 days.   Quantity: 1 each  Refills: 1     Repatha SureClick 140 MG/ML Soaj  Generic drug: Evolocumab      INJECT 140MG SUBCUTANEOUSLY ONCE EVERY 2 WEEKS.   Refills: 0     telmisartan 80 MG Tabs  Commonly known as: Micardis      Take 1 tablet (80 mg total) by mouth daily.   Quantity: 90 tablet  Refills: 3            STOP taking these medications      Meloxicam 15 MG Tabs                  Where to Get Your Medications        These medications were sent to [id number] - [place], [place] - [address] AT [facility] [address], [phone], [phone] [address], [facility] [address]      Phone: [phone]   aspirin 325 MG Tabs  celecoxib 200 MG Caps  HYDROcodone-acetaminophen 7.5-325 MG Tabs  Polyethylene Glycol 3350 17 g Pack           Hospital Discharge Diagnoses:  R knee medial compartment severe OA    Lace+ Score: 50  59-90 High Risk  29-58 Medium Risk  0-28   Low Risk.    TCM Follow-Up Recommendation:  LACE 29-58: Moderate Risk of readmission after discharge from the hospital.        I spent >30 minutes  on this discharge. Discussed treatment and discharge plans.       Beau Ortiz MD

## 2025-01-25 NOTE — OCCUPATIONAL THERAPY NOTE
OCCUPATIONAL THERAPY EVALUATION - INPATIENT     Room Number: Room 5/Room 5-A  Evaluation Date: 2025  Type of Evaluation: Initial  Presenting Problem: R TKA    Physician Order: IP Consult to Occupational Therapy  Reason for Therapy: ADL/IADL Dysfunction and Discharge Planning    OCCUPATIONAL THERAPY ASSESSMENT   Patient is a 69 year old male admitted 2025 for R TKA.  Prior to admission, patient's baseline is I with ADLs, IADLs, driving, working.  Patient is currently functioning near baseline with toileting, bathing, lower body dressing, bed mobility, and transfers.    PLAN  Patient has been evaluated and presents with no skilled Occupational Therapy needs  at this time.  Patient will be discharged from Occupational Therapy services. Please re-order if a new functional limitation presents during this admission.    OT Device Recommendations: Other (Comment) (RW)    OCCUPATIONAL THERAPY MEDICAL/SOCIAL HISTORY   Problem List  Principal Problem:    Primary osteoarthritis of right knee  Active Problems:    Essential hypertension with goal blood pressure less than 130/80    TANYA (obstructive sleep apnea)    HOME SITUATION  Type of Home: [facility]  Home Layout: One level; Ramped entrance (ramped entrance from garage)  Lives With: Spouse  Toilet and Equipment: Comfort height toilet  Shower/Tub and Equipment: Walk-in shower; Shower chair; Grab bar  Other Equipment: None  Hand Dominance: Right  Drives: Yes  Patient Regularly Uses: None    Stairs in Home: none  Use of Assistive Device(s): none    Prior Level of Martin: I with ADLs, IADLs, driving, working    SUBJECTIVE  \"I am ready to go home\"    OCCUPATIONAL THERAPY EXAMINATION    OBJECTIVE  Precautions: Limb alert - right  Fall Risk: Standard fall risk    WEIGHT BEARING RESTRICTION  R Lower Extremity: Weight Bearing as Tolerated    PAIN ASSESSMENT  Ratin  Location: R knee  Management Techniques: Activity promotion; Body mechanics; Breathing  techniques        COGNITION  Overall Cognitive Status:  WFL - within functional limits    VISION  Current Vision: no visual deficits    PERCEPTION  Overall Perception Status:   WFL - within functional limits    SENSATION  Light touch:  intact    Communication: WFL    Behavioral/Emotional/Social: WFL    RANGE OF MOTION   Upper extremity ROM is within functional limits     STRENGTH ASSESSMENT  Upper extremity strength is within functional limits     COORDINATION  Gross Motor: WFL   Fine Motor: WFL     ACTIVITIES OF DAILY LIVING ASSESSMENT  AM-PAC ‘6-Clicks’ Inpatient Daily Activity Short Form  How much help from another person does the patient currently need…  -   Putting on and taking off regular lower body clothing?: A Little  -   Bathing (including washing, rinsing, drying)?: A Little  -   Toileting, which includes using toilet, bedpan or urinal? : None  -   Putting on and taking off regular upper body clothing?: None  -   Taking care of personal grooming such as brushing teeth?: None  -   Eating meals?: None    AM-PAC Score:  Score: 22  Approx Degree of Impairment: 25.8%  Standardized Score (AM-PAC Scale): 47.1  CMS Modifier (G-Code): CJ    FUNCTIONAL TRANSFER ASSESSMENT  Sit to Stand: Chair  Chair: Stand-by Assist    BED MOBILITY     BALANCE ASSESSMENT  Static Sitting: Independent  Static Standing: Stand-by Assist    FUNCTIONAL ADL ASSESSMENT  Eating: Independent  Grooming Seated: Stand-by Assist  Bathing Seated: Stand-by Assist  UB Dressing Seated: Independent  LB Dressing Seated: Contact Guard Assist  Toileting Seated: Stand-by Assist    THERAPEUTIC EXERCISE     Skilled Therapy Provided: Pt seen for skilled OT evaluation to address pt's I and safety with functional mobility, t/fs, and ADLs. Pt's RN approved session and pt agreeable. Pt performing functional mobility and t/fs with SBA with RW, cues for RW management and Wbing status. Pt performed LBD with CGA for dynamic balance. Pt edu provided re: role of OT,  safe t/f techniques, DME/AE for ADLs, fall prevention, safe RW management, and energy conservation principles. Pt demo'd understanding of all concepts covered. End of session, pt in recliner,  all needs met. RN aware of pt status. Discontinue skilled OT, no further skilled OT needed.     EDUCATION PROVIDED  Patient Education : Role of Occupational Therapy; Plan of Care; DME Recommendations; Functional Transfer Techniques; Surgical Precautions; Posture/Positioning; Energy Conservation; Proper Body Mechanics  Patient's Response to Education: Verbalized Understanding; Returned Demonstration    The patient's Approx Degree of Impairment: 25.8% has been calculated based on documentation in the [facility] '6 clicks' Inpatient Daily Activity Short Form.  Research supports that patients with this level of impairment may benefit from home.  Final disposition will be made by interdisciplinary medical team.    Patient End of Session: Up in chair;Needs met;Call light within reach;RN aware of session/findings;All patient questions and concerns addressed    Patient was able to achieve the following ...   Patient able to toilet transfer  at previous functional level    Patient able to dress lower extremities  at previous functional level    Patient/Caregiver able to demonstrate safety with ADLS  at previous functional level     Patient Evaluation Complexity Level:   Occupational Profile/Medical History MODERATE - Expanded review of history including review of medical or therapy record   Specific performance deficits impacting engagement in ADL/IADL MODERATE  3 - 5 performance deficits   Client Assessment/Performance Deficits MODERATE - Comorbidities and min to mod modifications of tasks    Clinical Decision Making MODERATE - Analysis of occupational profile, detailed assessments, several treatment options    Overall Complexity MODERATE     OT Session Time:   Therapeutic Activity: 15 minutes    Margret Larry, Occupational Therapist,  MS, OTR/L

## 2025-01-25 NOTE — PHYSICAL THERAPY NOTE
PHYSICAL THERAPY EVALUATION - INPATIENT     Room Number: Room 5/Room 5-A  Evaluation Date: 1/25/2025  Type of Evaluation: Initial   Physician Order: PT Eval and Treat    Presenting Problem: admitted  for R TKA, 1-24-25  Co-Morbidities : arrhythmia, HTN  Reason for Therapy: Mobility Dysfunction and Discharge Planning    PHYSICAL THERAPY ASSESSMENT   Patient is a 69 year old male admitted 1/24/2025 for R TKA 1-24-25.  Prior to admission, patient's baseline is independent without a.d., lives at home with wife in a 1 level house.  Patient is currently functioning below baseline with bed mobility, transfers, gait, stair negotiation, standing prolonged periods, and performing household tasks.  Patient is requiring stand-by assist as a result of the following impairments: decreased functional strength, pain, impaired standing balance, and limited R knee ROM.  Physical Therapy will continue to follow for duration of hospitalization.    Patient will benefit from continued skilled PT Services at discharge to promote prior level of function.  Anticipate patient will return home with home health PT.    PLAN DURING HOSPITALIZATION  Nursing Mobility Recommendation : 1 Assist  PT Device Recommendation: Rolling walker        PHYSICAL THERAPY MEDICAL/SOCIAL HISTORY       Problem List  Principal Problem:    Primary osteoarthritis of right knee  Active Problems:    Essential hypertension with goal blood pressure less than 130/80    TANYA (obstructive sleep apnea)      HOME SITUATION  Type of Home: [facility]  Home Layout: One level (ramp at entrance)              Lives With: Spouse    Drives: Yes   Patient Regularly Uses: None     Prior Level of Walker: independent without a.d.     SUBJECTIVE  \"I'm ready to go\"    PHYSICAL THERAPY EXAMINATION   OBJECTIVE  Precautions: Limb alert - right  Fall Risk: Standard fall risk    WEIGHT BEARING RESTRICTION  R Lower Extremity: Weight Bearing as Tolerated      PAIN ASSESSMENT  Rating:  2  Location: R knee  Management Techniques: Activity promotion;Repositioning    COGNITION  Overall Cognitive Status:  WFL - within functional limits    RANGE OF MOTION AND STRENGTH ASSESSMENT  Upper extremity ROM and strength are within functional limits   Lower extremity ROM is within functional limits except R knee limited  Lower extremity strength is within functional limits except R knee 3-/5    BALANCE  Static Sitting: Good  Dynamic Sitting: Good  Static Standing: Fair -  Dynamic Standing: Fair -       AM-PAC '6-Clicks' INPATIENT SHORT FORM - BASIC MOBILITY  How much difficulty does the patient currently have...  Patient Difficulty: Turning over in bed (including adjusting bedclothes, sheets and blankets)?: A Little   Patient Difficulty: Sitting down on and standing up from a chair with arms (e.g., wheelchair, bedside commode, etc.): A Little   Patient Difficulty: Moving from lying on back to sitting on the side of the bed?: A Little   How much help from another person does the patient currently need...   Help from Another: Moving to and from a bed to a chair (including a wheelchair)?: A Little   Help from Another: Need to walk in hospital room?: A Little   Help from Another: Climbing 3-5 steps with a railing?: A Little     AM-PAC Score:  Raw Score: 18   Approx Degree of Impairment: 46.58%   Standardized Score (AM-PAC Scale): 43.63   CMS Modifier (G-Code): CK    FUNCTIONAL ABILITY STATUS  Functional Mobility/Gait Assessment  Gait Assistance: Supervision  Distance (ft): 125  Assistive Device: Rolling walker  Pattern: R Decreased stance time (decreased yolande, decreased step length)    Sit to Stand: stand by assist    Exercise/Education Provided:  Gait training  Strengthening  Lower therapeutic exercise:  Ankle pumps  Heel slides  Quad sets  Transfer training    Skilled Therapy Provided: pt received in the chair, RN approved activity. Pt participated well with PT. Educated on WBAT RLE and on TKA ex's. Verbalized  understanding. Given handout for HEP. Pt able to stand with SBA and ambulated with RW, SBA. Gait stable with a.d.    The patient's Approx Degree of Impairment: 46.58% has been calculated based on documentation in the [facility] '6 clicks' Inpatient Basic Mobility Short Form.  Research supports that patients with this level of impairment may benefit from home PT.  Final disposition will be made by interdisciplinary medical team.    Patient End of Session: Up in chair;Needs met;Call light within reach;RN aware of session/findings;All patient questions and concerns addressed    CURRENT GOALS  Goals to be met by: 2/8/25  Patient Goal Patient's self-stated goal is: walk better   Goal #1 Patient is able to demonstrate supine - sit EOB @ level: modified independent     Goal #1   Current Status    Goal #2 Patient is able to demonstrate transfers EOB to/from Chair/Wheelchair at assistance level: modified independent with rolling walker     Goal #2  Current Status    Goal #3 Patient is able to ambulate 150 feet with assist device: rolling walker at assistance level: modified independent   Goal #3   Current Status    Goal #4 Patient to demonstrate independence with home activity/exercise instructions provided to patient in preparation for discharge.   Goal #4   Current Status      Patient Evaluation Complexity Level:  History Moderate - 1 or 2 personal factors and/or co-morbidities   Examination of body systems Low -  addressing 1-2 elements   Clinical Presentation Low- Stable   Clinical Decision Making  Low Complexity     Gait Training: 15 minutes  Therapeutic Activity:  10 minutes

## 2025-01-26 NOTE — HOME CARE LIAISON
RECEIVED REFERRAL VIA [facility] FOR HOME HEALTH SERVICES. SPOKE W/ PATIENT VIA THE PHONE AND IS AGREEABLE FOR ALL HOME HEALTH CARE SERVICES . UPDATED DEMOGRAPHICS AND ADDED CONTACT INFORMATION TO THE AVS

## 2025-01-27 ENCOUNTER — TELEPHONE (OUTPATIENT)
Dept: ORTHOPEDICS CLINIC | Facility: CLINIC | Age: 70
End: 2025-01-27

## 2025-01-27 NOTE — TELEPHONE ENCOUNTER
Per discharge instructions-  \"May remove dressing and place new gauze or mepilex over incision in 4 days. Keep incision clean and dry.\"  Called Tabatha at [facility] and informed her per Dr Moreno discharge orders. She had no further questions.

## 2025-01-27 NOTE — OPERATIVE REPORT
[facility]    PATIENT'S NAME: MARY MAGUIRE   ATTENDING PHYSICIAN: Beau Ortiz MD   OPERATING PHYSICIAN: Stephen Lara MD   PATIENT ACCOUNT#:   [id number]    LOCATION:   Room 5 A [facility]  MEDICAL RECORD #:   [id number]       YOB: 1955  ADMISSION DATE:       01/24/2025      OPERATION DATE:  01/24/2025    OPERATIVE REPORT    PREOPERATIVE DIAGNOSIS:  Right knee osteoarthritis, primary.  POSTOPERATIVE DIAGNOSIS:  Right knee osteoarthritis, primary.  PROCEDURE:  Right knee unicompartmental arthroplasty.    ASSISTANT:  Chelsi Villarreal PA-C     SECOND ASSISTANT:  JEFF Weller    ANESTHESIA:  Spinal, with sedation.      COMPLICATIONS:  None.    BLOOD LOSS:  50 mL.    SPECIMEN:  Right knee bone and soft tissue to Pathology.    DRAINS:  Robledo catheter to gravity drainage.      INDICATIONS:  Patient is a 69-year-old male with history of progressive right knee pain unresponsive to conservative care.  Preoperative physical findings and imaging studies were consistent with end-stage osteoarthritis of the medial compartment.  Valgus stress x-ray showed good preservation of the lateral compartment joint space.  After discussion with the patient of the risks and benefits of proceeding with operative treatment of the right knee, he wished to proceed with surgery.  He failed conservative treatments including anti-inflammatory medications, therapeutic exercise, intra-articular injections, activity modifications, and attempts at weight loss.    OPERATIVE TECHNIQUE:  The patient was identified in the preoperative holding area.  The appropriate consents were obtained.  He was taken to the operating room and placed in supine position on the operating room table.  After adequate spinal anesthesia and sedation was achieved, a Robledo catheter was inserted using sterile technique.  A tourniquet was placed on the right thigh.  SCD device was placed on the left lower extremity.  The patient was given  preoperative IV antibiotics and IV tranexamic acid.  The right knee and lower extremity were then prepped and draped in a sterile fashion.  The right foot was padded and placed in the foot ortiz for the [facility] type knee positioner.  The extremity was then exsanguinated, and the tourniquet was inflated to 250 mmHg.  A longitudinal incision was then made over the anterior aspect of the knee.  A medial parapatellar retinacular incision was made.  Portions of the superficial medial collateral ligament were sharply elevated from the anteromedial proximal tibia.  The medial meniscus was resected.  The anterior cruciate ligament was inspected and appeared intact.  Articular cartilage along the femoral trochlea and patella was intact.  There was a small osteophyte at the medial patellar facet, which was excised with a rongeur.  Lateral compartment articular cartilage and meniscus appeared normal.  Next, osteophytes were removed from the distal femur and proximal tibia on the medial side.  We then placed the extramedullary tibial cutting guide, and a freehand method was used to cut the tibia.  A sagittal cut was made just lateral to the lateral edge of the medial femoral condyle on the tibia.  Care was taken to avoid the anterior cruciate ligament.  We then made an axial cut in the proximal tibia after referencing with a +4 cutter resection level.  The bone fragment was removed without difficulty.  Next, attention was turned to the gap sizing.  The flexion gap was 6 and the extension gap was 9.  Therefore, we cut an additional distal cut off the femur with a 6 mm cutter to make a 15 mm gap.  The cutting guide size 6 was placed and secured, and the cut distally was made in the usual fashion.  Next, a precutter was placed for cutting the posterior femur to get to a 9 mm cut.  The bone fragment was removed.  We then removed the medial meniscus.  We then sized the femur and chose a size 5 femoral implant.  The sizing guide was  placed and centered on the medial femoral condyle, and the drill holes were made.  We then placed the femoral cutting guide for the chamfer cut and posterior cut, and those cuts were made.  The bone fragments were then removed.  The remainder of medial meniscus was resected.  There were no loose bodies in the posterior knee.  Next, we finished the tibia and the femur.  We placed the tibial trial and chose a size 6 tibia.  This was secured, and the keel cutter was used to cut the keel posteriorly.  We also used the reaming device in order to ream 2 drill holes in the medial tibial plateau.  The femoral trial was placed and secured, and the 2 drill holes were made in the distal femur as well.  Next, using third-generation cementing technique, we cemented in a size 6 tibia and a size 5 femur.  The excess cement was removed.  We trialed and chose an 8 mm thickness highly cross-linked polyethylene insert.  This was snapped into position without difficulty.  These were Medacta ISABELA implants.  The area was copiously irrigated.  We did irrigate with diluted Betadine and then saline.  Tourniquet was deflated.  Attention was paid to hemostasis.  Excellent stability of the implant was noted in flexion and extension.  There was approximately 2 mm of laxity in extension and 3 mm in flexion.  The retinacular layer was then repaired with a combination of 0 Ethibond and 0 Vicryl sutures.  Skin and subcutaneous layers were closed with 2-0 Vicryl sutures and skin staples.  A sterile dressing was applied.  The patient's anesthesia was reversed.  He was taken to recovery room in stable condition.  All sponge and instrument counts were reported as correct.  The attending physician, Dr. Lara, was present and performed all critical portions of the procedure.  There were no complications.    Dictated By Stephen Lara MD  d: 01/24/2025 14:07:09  t: 01/24/2025 18:04:25  Job [id number]  DLO/

## 2025-02-06 ENCOUNTER — OFFICE VISIT (OUTPATIENT)
Dept: ORTHOPEDICS CLINIC | Facility: CLINIC | Age: 70
End: 2025-02-06
Payer: COMMERCIAL

## 2025-02-06 ENCOUNTER — HOSPITAL ENCOUNTER (OUTPATIENT)
Dept: GENERAL RADIOLOGY | Facility: HOSPITAL | Age: 70
Discharge: HOME | End: 2025-02-06
Payer: COMMERCIAL

## 2025-02-06 DIAGNOSIS — Z47.89 ORTHOPEDIC AFTERCARE: ICD-10-CM

## 2025-02-06 DIAGNOSIS — Z47.89 ORTHOPEDIC AFTERCARE: Primary | ICD-10-CM

## 2025-02-06 PROCEDURE — 1111F DSCHRG MED/CURRENT MED MERGE: CPT

## 2025-02-06 PROCEDURE — 99024 POSTOP FOLLOW-UP VISIT: CPT

## 2025-02-06 PROCEDURE — 73562 X-RAY EXAM OF KNEE 3: CPT

## 2025-02-06 NOTE — PROGRESS NOTES
NURSING INTAKE COMMENTS:   Chief Complaint   Patient presents with    Post-Op     1st POV Right knee unicompartmental arthroplasty surgery done on  01/24/2025. Pain 2/10.       HPI: This 69 year old male presents today with complaints of right knee surgery follow up. Patient is two weeks post operative from right unicompartmental arthroplasty. States he is doing great today. He is taking Tylenol and icing the knee as needed for pain and swelling. Taking ASA for DVT prophylaxis. He has had home therapy with good improvement. Ambulating without assistance. He is traveling to [place] at the end of the month for a golf trip.     Past Medical History:    Arrhythmia    Svt    BCC (basal cell carcinoma)    left forearm proximal    BCC (basal cell carcinoma)    left forearm distal    Hearing impaired person, bilateral    High blood pressure    High cholesterol    Obesity    Other and unspecified hyperlipidemia    Sleep apnea    not using machine    SVT (supraventricular tachycardia) (HCC)    Unspecified essential hypertension     Past Surgical History:   Procedure Laterality Date    Colonoscopy      Colonoscopy N/A 07/29/2024    Procedure: COLONOSCOPY;  Surgeon: Kristan Tovar MD;  Location: [facility] ENDOSCOPY    Colonoscopy & polypectomy      Ndsc ablation & rcnstj [facility] w/o bypass      Other surgical history  02/2017    heart ablation     Other surgical history  08/22/2023    1. Left tympanoplasty, 2. Cartilage graft, 3. Left eustachian tube balloon dilation 4. Facial nerve monitoring    Vasectomy       Current Outpatient Medications   Medication Sig Dispense Refill    aspirin 325 MG Oral Tab Take 1 tablet (325 mg total) by mouth 2 (two) times daily. 42 tablet 0    celecoxib 200 MG Oral Cap Take 1 capsule (200 mg total) by mouth daily. 30 capsule 0    albuterol 108 (90 Base) MCG/ACT Inhalation Aero Soln Inhale 2 puffs into the lungs every 4 (four) hours as needed for Wheezing.      mupirocin 2 % External Ointment  Apply to the naris 2 times daily for 5 days. 1 each 1    REPATHA SURECLICK 140 MG/ML Subcutaneous Solution Auto-injector INJECT 140MG SUBCUTANEOUSLY ONCE EVERY 2 WEEKS.      telmisartan 80 MG Oral Tab Take 1 tablet (80 mg total) by mouth daily. 90 tablet 3     Allergies[1]  Family History   Problem Relation Age of Onset    Cancer Father         Brain    Skin cancer Father     Cancer Daughter         sarcoma    Melanoma Brother     Melanoma Brother        Social History     Occupational History    Not on file   Tobacco Use    Smoking status: Former     Passive exposure: Past    Smokeless tobacco: Never    Tobacco comments:     Quit 40 yrs ago   Vaping Use    Vaping status: Never Used   Substance and Sexual Activity    Alcohol use: Yes     Alcohol/week: 4.0 standard drinks of alcohol     Types: 4 Standard drinks or equivalent per week    Drug use: Yes     Types: Cannabis     Comment: gummies to help sleep    Sexual activity: Not on file        Review of Systems:  GENERAL: denies fevers, chills, night sweats, fatigue, unintentional weight loss/gain  SKIN: denies skin lesions, open sores, rash  HEENT:denies recent vision change, new nasal congestion,hearing loss, tinnitus, sore throat, headaches  RESPIRATORY: denies new shortness of breath, cough, asthma, wheezing  CARDIOVASCULAR: denies chest pain, leg cramps with exertion, palpitations, leg swelling  GI: denies abdominal pain, nausea, vomiting, diarrhea, constipation, hematochezia, worsening heartburn or stomach ulcers  : denies dysuria, hematuria, incontinence, increased frequency, urgency, difficulty urinating  MUSCULOSKELETAL: denies musculoskeletal complaints other than in HPI  NEURO: denies numbness, tingling, weakness, balance issues, dizziness, memory loss  PSYCHIATRIC: denies Hx of depression, anxiety, other psychiatric disorders  HEMATOLOGIC: denies blood clots, anemia, blood clotting disorders, blood transfusion  ENDOCRINE: denies autoimmune disease,  thyroid issues, or diabetes  ALLERGY: denies asthma, seasonal allergies    Physical Examination:    There were no vitals taken for this visit.  Constitutional: appears well hydrated, alert and responsive, no acute distress noted  Extremities: Right knee incision is dry and intact. There is no calf tenderness or swelling. No erythema or palpable warmth. Range of motion from 0 to 100 degrees without pain. Good varus and valgus stability in midflexion. No numbness.   Neurological: Unchanged     Imaging:   XR KNEE (1 OR 2 VIEWS), RIGHT (CPT=73560)    Result Date: 1/24/2025  PROCEDURE: XR KNEE (1 OR 2 VIEWS), RIGHT (CPT=73560)  COMPARISON: [facility] 2nd Floor, XR KNEE (1 OR 2 VIEWS), RIGHT (CPT=73560), 12/12/2024, 10:45 AM.  INDICATIONS: Right knee unicompartmental arthroplasty.  TECHNIQUE: 2 nonweightbearing views were obtained.           CONCLUSION:  1. Anatomically aligned right medial compartment knee arthroplasty.  No immediate complication. 2. Atherosclerotic vascular calcification.    Dictated by (CST): Kale Webber MD on 1/24/2025 at 2:33 PM     Finalized by (CST): Kale Webber MD on 1/24/2025 at 2:34 PM          XR CHEST PA + LAT CHEST (CPT=71046)    Result Date: 1/17/2025  PROCEDURE: XR CHEST PA + LAT CHEST (CPT=71046)  COMPARISON: [facility], XR CHEST PA + LAT CHEST (CPT=71046), 11/15/2024, 11:04 AM.  INDICATIONS: Preoperative physical exam,hypertension.  TECHNIQUE:   Two views.   FINDINGS: CARDIAC/VASC: Normal cardiac silhouette. MEDIAST/VELVET: Atherosclerotic aorta with no visible aneurysm.  LUNGS/PLEURA: No significant pulmonary parenchymal abnormalities.  No effusion or pleural thickening.  BONES: Mild scoliosis with mild degenerative disc disease and spondylosis.  OTHER: Negative.          CONCLUSION:  1. No acute cardiopulmonary disease    Dictated by (CST): Wade Dumont MD on 1/17/2025 at 11:21 AM     Finalized by (CST): Julia  Wade DANIEL MD on 1/17/2025 at 11:22 AM             Labs:  Lab Results   Component Value Date    WBC 5.3 01/17/2025    HGB 14.4 01/25/2025    .0 01/17/2025      Lab Results   Component Value Date     (H) 01/25/2025    BUN 17 01/25/2025    CREATSERUM 0.88 01/25/2025    GFRNAA 88 10/23/2020    GFRAA 102 10/23/2020        Assessment and Plan:  Diagnoses and all orders for this visit:    Orthopedic aftercare  -     XR KNEE (3 VIEWS), RIGHT (CPT=73562); Future  -     PHYSICAL THERAPY - INTERNAL        Assessment: Healing right unicompartmental arthroplasty     Plan: Bandage and staples removed, patient tolerated well. Discussed wound care. He may wet incision and wash with soap and water. Steri strips placed over incision, remove any remaining strips in one week. Continue ASA for one more week. Continue icing and oral anti-inflammatories as needed for pain and swelling. Outpatient physical therapy referral provided today. Follow up in four weeks for second post op visit with new x-rays.     Follow Up: Return in about 4 weeks (around 3/6/2025).    Chelsi Villarreal PA-C       [1]   Allergies  Allergen Reactions    Lisinopril ANAPHYLAXIS    Pravastatin PAIN     Severe joint pain.

## 2025-02-10 ENCOUNTER — TELEPHONE (OUTPATIENT)
Dept: PHYSICAL THERAPY | Facility: HOSPITAL | Age: 70
End: 2025-02-10

## 2025-02-12 ENCOUNTER — OFFICE VISIT (OUTPATIENT)
Dept: PHYSICAL THERAPY | Age: 70
End: 2025-02-12
Payer: COMMERCIAL

## 2025-02-12 DIAGNOSIS — Z47.89 ORTHOPEDIC AFTERCARE: Primary | ICD-10-CM

## 2025-02-12 PROCEDURE — 97110 THERAPEUTIC EXERCISES: CPT

## 2025-02-12 PROCEDURE — 97161 PT EVAL LOW COMPLEX 20 MIN: CPT

## 2025-02-12 NOTE — PROGRESS NOTES
LOWER EXTREMITY EVALUATION:     Diagnosis:   Orthopedic aftercare (Z47.89)  S/P R TKA on 1/24/25 Patient:  Lanre Melchor (69 year old, male)        Referring Provider: Chelsi Villarreal  Today's Date   2/12/2025    Precautions:  Cancer   Date of Evaluation: 02/12/25  Next MD visit: none scheduled  Date of Surgery: 1/24/25     PATIENT SUMMARY   Summary of chief complaints: R knee pain and stiffness  History of current condition: Patient reports his knee had been bothering him for \"years\". Had an arthroscopy several years ago. Knee pain continued. Had TKA on 1/24/25. Used a walker for about 2 days. Had home health PT until last Wednesday. 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   Pain level: current 1 /10, at best 1 /10, at worst 2 /10  Occupation: Management (mix of sitting and standing)   Leisure activities/Hobbies: treadmill, bike for exercise   Prior level of function: Limited by knee pain for several years  Current limitations: Walking, standing, stairs, squatting  Pt goals: Recovering quickly  Past medical history was reviewed with Lanre.  Significant findings include: basal cell carcinoma, hypertension  Lanre  has a past medical history of Arrhythmia, BCC (basal cell carcinoma) (2023), BCC (basal cell carcinoma) (2023), Hearing impaired person, bilateral, High blood pressure, High cholesterol, Obesity, Other and unspecified hyperlipidemia, Sleep apnea, SVT (supraventricular tachycardia) (HCC), and Unspecified essential hypertension.  He  has a past surgical history that includes other surgical history (02/2017); other surgical history (08/22/2023); colonoscopy; vasectomy; colonoscopy & polypectomy; colonoscopy (N/A, 07/29/2024); and ndsc ablation & rcnstj [facility] w/o bypass.    ASSESSMENT  Lanre presents to physical therapy evaluation with primary c/o R knee pain and stiffness. The results of the objective tests and measures show decreased R knee ROM, decreased RLE  strength, decreased BLE flexibility, and antalgic gait pattern. Functional deficits include but are not limited to Walking, standing, stairs, squatting. Signs and symptoms are consistent with diagnosis of Orthopedic aftercare (Z47.89)  S/P R TKA on 1/24/25. Pt and PT discussed evaluation findings, pathology, POC and HEP.  Pt voiced understanding and performs HEP correctly without reported pain. Skilled Physical Therapy is medically necessary to address the above impairments and reach functional goals.    OBJECTIVE:   Musculoskeletal  Observation: mild R knee and lower leg edema, incision clean and healing well (no erythema, warmth or drainage)   Accessory Motion: min/mod loss R patellar mobility all planes       TONY ROM WNL and Strength (5/5) except below:  (* denotes performed with pain)  Hip   MMT (-/5)    R L     Flex (L2) 4+ 5     Ext  4 4     Abd 4+ 4+     ,   Knee   ROM MMT (-/5)    R L R L     Flex 118 130 4+ 5     Ext (L3) 4 0 5 5     ,   Ankle/Foot   MMT (-/5)    R L     PF         DF (L4) 5 5     Inversion         Eversion         Grt Toe Ext (L5)           Flexibility:  LE Flexibility R L     Hip Flexor         Hamstrings mod/severe loss mod loss     ITB         Piriformis         Quads         Gastroc-soleus         Neurological:  Sensation: intact     Balance and Functional Mobility:  Gait: pt ambulates on level ground with other (use comment) (minimal antalgia)       Today's Treatment and Response:   Pt education was provided on exam findings, treatment diagnosis, treatment plan, expectations, and prognosis.  Today's Treatment       2/12/2025   PT Treatment   Therapeutic Exercise SLR with quad set x 15  S/L hip abd x 15  Supine HS stretch with strap 2x30\"   Long arc quad 15x5\"    Therapeutic Exercise Min 12   Evaluation Min 23   Total of Timed Procedures 12   Total of Service Based 23   Total Treatment Time 35         Patient was instructed in and issued a HEP for: SLR, sidelying hip abduction, supine  hamstring stretch with strap, LAQ    Charges:  PT EVAL: Low Complexity, Eval, TE x 1  In agreement with evaluation findings and clinical rationale, this evaluation involved LOW COMPLEXITY decision making due to no personal factors/comorbidities, 1-2 body structures involved/activity limitations, and stable symptoms as documented in the evaluation.                                                                                                                PLAN OF CARE:    Goals: (to be met in 12 visits)   Goals       Therapy Goals     Patient will be independent with HEP, it's progression, and self-management of their symptoms  Patient will demo R knee AROM at least 0-130 to be able to perform lower body dressing without difficulty.  Patient will demo R knee strength 5/5 and R hip strength at least 4+/5 to be able to negotiate stairs reciprocally.  Patient will demo normalized gait pattern to be able to walk for 1 hr with pain <1/10.              Frequency / Duration: Patient will be seen 2x/week or a total of 12  visits over a 90 day period. Treatment will include: Gait training; Manual Therapy; Neuromuscular Re-education; Therapeutic Activities; Therapeutic Exercise; Home Exercise Program instruction    Education or treatment limitation: None   Rehab Potential: good     LEFS Score  LEFS Score: (Patient-Rptd) 90 % (2/10/2025 12:56 PM)      Patient was advised of these findings, precautions, and treatment options and has agreed to actively participate in planning and for this course of care.    Thank you for your referral. Please co-sign or sign and return this letter via fax as soon as possible to [phone]. If you have any questions, please contact me at Dept: [phone]    Sincerely,  Electronically signed by therapist: Anibal Almonte, PT  Physician's certification required: Yes  I certify the need for these services furnished under this plan of treatment and while under my  care.    X___________________________________________________ Date____________________    Certification From: 2/12/2025  To:5/13/2025

## 2025-02-14 ENCOUNTER — OFFICE VISIT (OUTPATIENT)
Dept: PHYSICAL THERAPY | Age: 70
End: 2025-02-14
Payer: COMMERCIAL

## 2025-02-14 PROCEDURE — 97110 THERAPEUTIC EXERCISES: CPT

## 2025-02-14 NOTE — PROGRESS NOTES
Patient: Lanre Melchor (69 year old, male) Referring Provider:  Insurance:   Diagnosis: Orthopedic aftercare (Z47.89)  S/P R TKA on 1/24/25 Chelsi Villarreal  [facility]   Date of Surgery: 1/24/25 Next MD visit:  N/A   Precautions:  Cancer none scheduled Referral Information:    Date of Evaluation: Req: 12, Auth: 12, Exp: 4/30/2025 02/12/25 POC Auth Visits:  12       Today's Date   2/14/2025    Subjective  Patient reports his knee feels great. Denies any pain currently.       Pain: 0/10     Objective  see flow chart below (R knee AROM: 3-120 deg)              Assessment  R knee AROM nearing WNL with no pain at end range with stretching. Initiated step training to increase ease with reciprocal stair negotiation. Patient does have some groin discomfort with SLR. Instructed patient to focus on quad activation vs hip flexor and stop exercise at home if discomfort persists.    Goals (to be met in 12 visits)   Goals       Therapy Goals     Patient will be independent with HEP, it's progression, and self-management of their symptoms  Patient will demo R knee AROM at least 0-130 to be able to perform lower body dressing without difficulty.  Patient will demo R knee strength 5/5 and R hip strength at least 4+/5 to be able to negotiate stairs reciprocally.  Patient will demo normalized gait pattern to be able to walk for 1 hr with pain <1/10.              Plan  Continue PT for R knee ROM, RLE strengthening, balance and gait training, HEP    Treatment Last 4 Visits       2/12/2025 2/14/2025   PT Treatment   Treatment Day  2   Therapeutic Exercise SLR with quad set x 15  S/L hip abd x 15  Supine HS stretch with strap 2x30\"   Long arc quad 15x5\"  NU step L5 x 5 min UEs/LEs  SLR with quad set 2x10  Knee flexion with therapist OP 10x  SAQ over bolster 20x5\" with 2#  6\" fwd step up 20x with 1 hand rail  6\" lat step up 20x with rail   Tilt board A/P and M/L taps 20x ea   Standing hip abd/ext 2x10 ea R/L with YTB   Shuttle DL  press 20x with 7 cords  Shuttle SL press 20x with 4 cords  Standing heel raise 20x  Gastroc stretch on slant x 1 min  HS stretch at stair 3x30\"          Therapeutic Exercise Min 12 38   Evaluation Min 23    Total of Timed Procedures 12 38   Total of Service Based 23 0   Total Treatment Time 35 38         HEP  Reviewed, added HS stretch at stair    Charges     TE x 3

## 2025-02-17 ENCOUNTER — APPOINTMENT (OUTPATIENT)
Dept: PHYSICAL THERAPY | Age: 70
End: 2025-02-17
Payer: COMMERCIAL

## 2025-02-21 ENCOUNTER — APPOINTMENT (OUTPATIENT)
Dept: PHYSICAL THERAPY | Age: 70
End: 2025-02-21
Payer: COMMERCIAL

## 2025-02-24 ENCOUNTER — APPOINTMENT (OUTPATIENT)
Dept: PHYSICAL THERAPY | Age: 70
End: 2025-02-24
Payer: COMMERCIAL

## 2025-02-25 ENCOUNTER — OFFICE VISIT (OUTPATIENT)
Dept: FAMILY MEDICINE CLINIC | Facility: CLINIC | Age: 70
End: 2025-02-25
Payer: COMMERCIAL

## 2025-02-25 VITALS
WEIGHT: 296 LBS | BODY MASS INDEX: 39.23 KG/M2 | DIASTOLIC BLOOD PRESSURE: 77 MMHG | SYSTOLIC BLOOD PRESSURE: 118 MMHG | HEIGHT: 73 IN | HEART RATE: 86 BPM

## 2025-02-25 DIAGNOSIS — M17.11 PRIMARY OSTEOARTHRITIS OF RIGHT KNEE: ICD-10-CM

## 2025-02-25 DIAGNOSIS — H91.90 HEARING LOSS, UNSPECIFIED HEARING LOSS TYPE, UNSPECIFIED LATERALITY: Primary | ICD-10-CM

## 2025-02-25 PROCEDURE — 99213 OFFICE O/P EST LOW 20 MIN: CPT | Performed by: FAMILY MEDICINE

## 2025-02-25 PROCEDURE — 3008F BODY MASS INDEX DOCD: CPT | Performed by: FAMILY MEDICINE

## 2025-02-25 PROCEDURE — 3078F DIAST BP <80 MM HG: CPT | Performed by: FAMILY MEDICINE

## 2025-02-25 PROCEDURE — 3074F SYST BP LT 130 MM HG: CPT | Performed by: FAMILY MEDICINE

## 2025-02-25 RX ORDER — CELECOXIB 200 MG/1
200 CAPSULE ORAL DAILY
Qty: 30 CAPSULE | Refills: 0 | Status: SHIPPED | OUTPATIENT
Start: 2025-02-25

## 2025-02-25 NOTE — PROGRESS NOTES
Blood pressure 118/77, pulse 86, height 6' 1\" (1.854 m), weight 296 lb (134.3 kg).      Low back pain.  Occasional left leg pain.  Right knee replacement was done 5 weeks ago.    No bowel or bladder dysfunction no trauma.  Working out with physical therapy.  Has hearing appointment scheduled with audiology  Objective    Patient comfortable no apparent distress    L4-S1 motor intact bilaterally    No spinous process tenderness in the back    Negative FADIR testing bilaterally    Negative straight leg raise testing bilaterally    Assessment low back pain and poor hearing    Plan start Celebrex also may use pain relievers at nighttime prescribed by orthopedics    Follow-up if no improvement    Audiology referral entered

## 2025-02-28 RX ORDER — CELECOXIB 200 MG/1
200 CAPSULE ORAL DAILY
Qty: 90 CAPSULE | Refills: 0 | OUTPATIENT
Start: 2025-02-28

## 2025-02-28 NOTE — TELEPHONE ENCOUNTER
Duplicate request, previously addressed.    Celecoxib 200 mg : was sent to [facility] on 02/25/2025 for a month supply.

## 2025-03-07 ENCOUNTER — APPOINTMENT (OUTPATIENT)
Dept: PHYSICAL THERAPY | Age: 70
End: 2025-03-07
Payer: COMMERCIAL

## 2025-03-07 ENCOUNTER — OFFICE VISIT (OUTPATIENT)
Dept: AUDIOLOGY | Facility: CLINIC | Age: 70
End: 2025-03-07
Payer: COMMERCIAL

## 2025-03-07 DIAGNOSIS — H90.3 ASYMMETRIC SNHL (SENSORINEURAL HEARING LOSS): Primary | ICD-10-CM

## 2025-03-07 PROCEDURE — 92593 HEARING AID CHECK, BOTH EARS: CPT | Performed by: AUDIOLOGIST

## 2025-03-07 NOTE — PROGRESS NOTES
HEARING AID FOLLOW-UP    Lanre Melchor  6/9/1955  [id number]    Patient is here for an annual.    Hearing Aid Information        Right     Left    Phonak Phonak    Audeo L90-R Audeo L90-R    [id number]  [id number]    Champagne  Champagne    Moderate #2 speaker Moderate #2 speaker    C-Shell [id number] C-Shell [id number]    ACC [id number] ACC [id number]    Cerustop Cerustop           The patient has the following concerns:   Not hearing well from right hearing aid since repair in December.     In office the following actions were taken:  Cleaned aid  Cleaned earmold  Suctioned microphone port  Changed wax guard  Listening check    Connected both HAids to software. Pt noted immediate improvement & declined further adjustments.     Patient is to follow up in Oct 2025 or sooner if needed, as previously recommended.

## 2025-03-08 ENCOUNTER — HOSPITAL ENCOUNTER (EMERGENCY)
Facility: HOSPITAL | Age: 70
Discharge: HOME | End: 2025-03-08
Attending: EMERGENCY MEDICINE
Payer: COMMERCIAL

## 2025-03-08 VITALS
DIASTOLIC BLOOD PRESSURE: 81 MMHG | HEART RATE: 64 BPM | SYSTOLIC BLOOD PRESSURE: 135 MMHG | HEIGHT: 73 IN | BODY MASS INDEX: 38.43 KG/M2 | TEMPERATURE: 98 F | OXYGEN SATURATION: 98 % | WEIGHT: 290 LBS | RESPIRATION RATE: 18 BRPM

## 2025-03-08 DIAGNOSIS — M54.16 LUMBAR RADICULOPATHY: Primary | ICD-10-CM

## 2025-03-08 LAB
BILIRUB UR QL: NEGATIVE
CLARITY UR: CLEAR
GLUCOSE UR-MCNC: NORMAL MG/DL
HGB UR QL STRIP.AUTO: NEGATIVE
KETONES UR-MCNC: NEGATIVE MG/DL
LEUKOCYTE ESTERASE UR QL STRIP.AUTO: NEGATIVE
NITRITE UR QL STRIP.AUTO: NEGATIVE
PH UR: 6 [PH] (ref 5–8)
PROT UR-MCNC: NEGATIVE MG/DL
SP GR UR STRIP: 1.01 (ref 1–1.03)
UROBILINOGEN UR STRIP-ACNC: NORMAL

## 2025-03-08 PROCEDURE — 96372 THER/PROPH/DIAG INJ SC/IM: CPT

## 2025-03-08 PROCEDURE — 81003 URINALYSIS AUTO W/O SCOPE: CPT | Performed by: EMERGENCY MEDICINE

## 2025-03-08 PROCEDURE — 99283 EMERGENCY DEPT VISIT LOW MDM: CPT

## 2025-03-08 PROCEDURE — 99284 EMERGENCY DEPT VISIT MOD MDM: CPT

## 2025-03-08 RX ORDER — CYCLOBENZAPRINE HCL 10 MG
10 TABLET ORAL 3 TIMES DAILY PRN
Qty: 20 TABLET | Refills: 0 | Status: SHIPPED | OUTPATIENT
Start: 2025-03-08 | End: 2025-03-15

## 2025-03-08 RX ORDER — HYDROCODONE BITARTRATE AND ACETAMINOPHEN 10; 325 MG/1; MG/1
1 TABLET ORAL EVERY 6 HOURS PRN
Qty: 10 TABLET | Refills: 0 | Status: SHIPPED | OUTPATIENT
Start: 2025-03-08 | End: 2025-03-13

## 2025-03-08 RX ORDER — METHYLPREDNISOLONE 4 MG/1
TABLET ORAL
Qty: 1 EACH | Refills: 0 | Status: SHIPPED | OUTPATIENT
Start: 2025-03-08

## 2025-03-08 RX ORDER — KETOROLAC TROMETHAMINE 30 MG/ML
30 INJECTION, SOLUTION INTRAMUSCULAR; INTRAVENOUS ONCE
Status: COMPLETED | OUTPATIENT
Start: 2025-03-08 | End: 2025-03-08

## 2025-03-08 NOTE — ED PROVIDER NOTES
Patient Seen in: [facility] Emergency Department      History     Chief Complaint   Patient presents with    Back Pain     Stated Complaint: Back pain    Subjective:   HPI      69-year-old male presents for evaluation for back pain.  Pain is left-sided, worse with movement, associated with some weakness and walking down the stairs.  He did recently have a right knee replacement.  He saw his PCP for this on 25 February.  He was started on Celebrex. There is no saddle anesthesia, no urinary incontinence or retention, no bowel incontinence, and no numbness or tingling of the lower extremities.    Objective:     Past Medical History:    Arrhythmia    Svt    BCC (basal cell carcinoma)    left forearm proximal    BCC (basal cell carcinoma)    left forearm distal    Hearing impaired person, bilateral    High blood pressure    High cholesterol    Obesity    Other and unspecified hyperlipidemia    Sleep apnea    not using machine    SVT (supraventricular tachycardia) (HCC)    Unspecified essential hypertension              Past Surgical History:   Procedure Laterality Date    Colonoscopy      Colonoscopy N/A 07/29/2024    Procedure: COLONOSCOPY;  Surgeon: Kristan Tovar MD;  Location: [facility] ENDOSCOPY    Colonoscopy & polypectomy      Ndsc ablation & rcnstj atria lmtd w/o bypass      Other surgical history  02/2017    heart ablation     Other surgical history  08/22/2023    1. Left tympanoplasty, 2. Cartilage graft, 3. Left eustachian tube balloon dilation 4. Facial nerve monitoring    Vasectomy                  Social History     Socioeconomic History    Marital status:    Tobacco Use    Smoking status: Former     Passive exposure: Past    Smokeless tobacco: Never    Tobacco comments:     Quit 40 yrs ago   Vaping Use    Vaping status: Never Used   Substance and Sexual Activity    Alcohol use: Yes     Alcohol/week: 4.0 standard drinks of alcohol     Types: 4 Standard drinks or equivalent per week    Drug use: Yes      Types: Cannabis     Comment: gummies to help sleep   Other Topics Concern    Grew up on a farm No    History of tanning Yes    Outdoor occupation No    Reaction to local anesthetic No    Pt has a pacemaker No    Pt has a defibrillator No     Social Drivers of Health     Food Insecurity: No Food Insecurity (1/24/2025)    Food Insecurity     Food Insecurity: Never true   Transportation Needs: No Transportation Needs (1/24/2025)    Transportation Needs     Lack of Transportation: No   Housing Stability: Low Risk  (1/24/2025)    Housing Stability     Housing Instability: No                  Physical Exam     ED Triage Vitals [03/08/25 0638]   /82   Pulse 60   Resp 18   Temp 97.9 °F (36.6 °C)   Temp src Oral   SpO2 100 %   O2 Device None (Room air)       Current Vitals:   Vital Signs  BP: 135/81  Pulse: 64  Resp: 18  Temp: 97.9 °F (36.6 °C)  Temp src: Oral    Oxygen Therapy  SpO2: 98 %  O2 Device: None (Room air)        Physical Exam  Vitals and nursing note reviewed.   Constitutional:       Appearance: Normal appearance.   HENT:      Head: Normocephalic and atraumatic.   Cardiovascular:      Rate and Rhythm: Normal rate and regular rhythm.      Pulses: Normal pulses.           Dorsalis pedis pulses are 2+ on the right side and 2+ on the left side.        Posterior tibial pulses are 2+ on the right side and 2+ on the left side.   Pulmonary:      Effort: Pulmonary effort is normal.      Breath sounds: Normal breath sounds.   Abdominal:      General: Abdomen is flat. There is no abdominal bruit.      Palpations: Abdomen is soft. There is no pulsatile mass.      Tenderness: There is no abdominal tenderness.      Comments: No pulsatile mass. No abdominal bruit.    Musculoskeletal:         General: Normal range of motion.      Cervical back: Normal range of motion.      Lumbar back: No swelling, edema or deformity.      Right lower leg: No edema.      Left lower leg: No edema.      Comments: No rash   Skin:      General: Skin is warm and dry.      Findings: No rash.   Neurological:      General: No focal deficit present.      Mental Status: He is alert.      Cranial Nerves: No cranial nerve deficit, dysarthria or facial asymmetry.      Sensory: Sensation is intact.      Motor: Motor function is intact. No weakness.      Coordination: Coordination is intact.      Gait: Gait is intact.      Deep Tendon Reflexes:      Reflex Scores:       Patellar reflexes are 2+ on the right side and 2+ on the left side.            ED Course     Labs Reviewed   URINALYSIS WITH CULTURE REFLEX   RAINBOW DRAW LAVENDER   RAINBOW DRAW LIGHT GREEN                   MDM              Medical Decision Making  Differential diagnosis includes but is not limited to lumbar radiculopathy, muscular strain or sprain, etc    There is no fever, no bowel or bladder incontinence or urinary retention. No recent spinal surgery or other invasive procedures. No history of IV drug abuse, no saddle anesthesia. Spinal epidural abscess and cauda equina are low on my differential.  Patient likely with lumbar radiculopathy.  He was given a dose of Toradol in the ED.  He will be prescribed hydrocodone, Medrol Dosepak, Flexeril and is to take his Celebrex.  He has to follow-up with PCP.  Return precautions given.    Medical Record Review: I personally reviewed available prior medical records for any recent pertinent discharge summaries, testing, and procedures, and reviewed those reports.    Complicating factors: The patient  has a past medical history of Arrhythmia, BCC (basal cell carcinoma) (2023), BCC (basal cell carcinoma) (2023), Hearing impaired person, bilateral, High blood pressure, High cholesterol, Obesity, Other and unspecified hyperlipidemia, Sleep apnea, SVT (supraventricular tachycardia) (HCC), and Unspecified essential hypertension. and  has a past surgical history that includes other surgical history (02/2017); other surgical history (08/22/2023);  colonoscopy; vasectomy; colonoscopy & polypectomy; colonoscopy (N/A, 07/29/2024); and ndsc ablation & rcnstj atria lmtd w/o bypass. that contribute to the medical complexity of this ED evaluation.     Clinical impression as well as any lab results and radiology findings were discussed with the patient and/or caregiver. I personally reviewed all laboratory results and radiology images myself. Patient is advised to follow up with PCP for reevaluation. I provided discharge instructions and return precautions. Patient and/or caregiver voices understanding and agreement with the treatment plan. All questions were addressed and answered.             Problems Addressed:  Lumbar radiculopathy: acute illness or injury    Amount and/or Complexity of Data Reviewed  External Data Reviewed: notes.     Details: PCP notes on 2/25/25 reviewed, patient presented with back pain and was started on celebrex.  Labs: ordered. Decision-making details documented in ED Course.    Risk  Prescription drug management.        Disposition and Plan     Clinical Impression:  1. Lumbar radiculopathy         Disposition:  Discharge  3/8/2025 10:16 am    Follow-up:  Behar, Alex, MD  [address]  [facility] [id number] [phone]    Follow up      Wade Hernandez, DO  [address] [facility]  [address]  [place] [id number] [phone]    Follow up      We recommend that you schedule follow up care with a primary care provider within the next three months to obtain basic health screening including reassessment of your blood pressure.      Medications Prescribed:  Discharge Medication List as of 3/8/2025 10:21 AM        START taking these medications    Details   HYDROcodone-acetaminophen  MG Oral Tab Take 1 tablet by mouth every 6 (six) hours as needed for Pain., Normal, Disp-10 tablet, R-0      cyclobenzaprine 10 MG Oral Tab Take 1 tablet (10 mg total) by mouth 3 (three) times daily as needed for Muscle spasms., Normal, Disp-20 tablet, R-0       methylPREDNISolone (MEDROL) 4 MG Oral Tablet Therapy Pack Dosepack: take as directed, Normal, Disp-1 each, R-0                 Supplementary Documentation:

## 2025-03-08 NOTE — ED INITIAL ASSESSMENT (HPI)
Patient to the ED from home for complaint of left sided back pain for the past 3 weeks. PCP told him it's probably sore muscles from trying to do more things since his partial right knee surgery 6 weeks ago. Pt AO 4

## 2025-03-10 ENCOUNTER — APPOINTMENT (OUTPATIENT)
Dept: PHYSICAL THERAPY | Age: 70
End: 2025-03-10
Payer: COMMERCIAL

## 2025-03-10 ENCOUNTER — OFFICE VISIT (OUTPATIENT)
Dept: FAMILY MEDICINE CLINIC | Facility: CLINIC | Age: 70
End: 2025-03-10
Payer: COMMERCIAL

## 2025-03-10 VITALS
HEIGHT: 73 IN | SYSTOLIC BLOOD PRESSURE: 124 MMHG | DIASTOLIC BLOOD PRESSURE: 60 MMHG | WEIGHT: 294 LBS | BODY MASS INDEX: 38.97 KG/M2 | HEART RATE: 91 BPM

## 2025-03-10 DIAGNOSIS — Z80.8 FAMILY HISTORY OF SKIN CANCER: Primary | ICD-10-CM

## 2025-03-10 RX ORDER — METHYLPREDNISOLONE 4 MG/1
TABLET ORAL
Qty: 1 EACH | Refills: 1 | Status: SHIPPED | OUTPATIENT
Start: 2025-03-10

## 2025-03-10 NOTE — PROGRESS NOTES
Blood pressure 124/60, pulse 91, height 6' 1\" (1.854 m), weight 294 lb (133.4 kg).      BACK PAIN IMPROVED FOLLOWING UP FROM ER VISIT CELEBREX NOT HELPFUL    ALSO REQUESTING DERM REFERRAL  NO TRAUMA HISTORY NO B/B DYSFUNCTION NO LEG PAIN  OBJECTIVE COMFORTABLE NAD    1. Family history of skin cancer  REFERRAL ENTERED  - Derm Referral - [facility] ([place])  2.  MEDROL DOSEPACK FOR BACK PAIN HISTORY KNEE REPLACEMENT

## 2025-03-12 ENCOUNTER — PATIENT OUTREACH (OUTPATIENT)
Dept: CASE MANAGEMENT | Age: 70
End: 2025-03-12

## 2025-03-12 NOTE — PROGRESS NOTES
ED Hospital Follow up for pcp (Discharge 3/8 [facility])     PCP   Wade Hernandez   [address] [place] [address] [phone]  Existing appt 3/10; pt already followed up     Confirmed with pt   Closing encounter

## 2025-03-14 ENCOUNTER — APPOINTMENT (OUTPATIENT)
Dept: PHYSICAL THERAPY | Age: 70
End: 2025-03-14
Payer: COMMERCIAL

## 2025-03-17 ENCOUNTER — APPOINTMENT (OUTPATIENT)
Dept: PHYSICAL THERAPY | Age: 70
End: 2025-03-17
Payer: COMMERCIAL

## 2025-03-21 ENCOUNTER — APPOINTMENT (OUTPATIENT)
Dept: PHYSICAL THERAPY | Age: 70
End: 2025-03-21
Payer: COMMERCIAL

## 2025-03-24 ENCOUNTER — APPOINTMENT (OUTPATIENT)
Dept: PHYSICAL THERAPY | Age: 70
End: 2025-03-24
Payer: COMMERCIAL

## 2025-03-28 ENCOUNTER — APPOINTMENT (OUTPATIENT)
Dept: PHYSICAL THERAPY | Age: 70
End: 2025-03-28
Payer: COMMERCIAL

## 2025-05-23 NOTE — ED INITIAL ASSESSMENT (HPI)
PATIENT SEEN IN CLINIC LAST WEEK, WAS INSTRUCTED TO TAKE Z-PACK IF SYMPTOMS DID NOT IMPROVE. STATES HE TOOK 5 DAY COURSE OF Z-PACK. STATES SYMPTOMS INITIALLY IMPROVED HOWEVER NOW C/O COUGH OF THE SAME INTENSITY PRIOR TO TREATMENT.   DENIES CHEST PAIN, STERLING yes

## 2025-05-29 ENCOUNTER — OFFICE VISIT (OUTPATIENT)
Dept: DERMATOLOGY CLINIC | Facility: CLINIC | Age: 70
End: 2025-05-29
Payer: COMMERCIAL

## 2025-05-29 DIAGNOSIS — D22.9 MULTIPLE BENIGN NEVI: ICD-10-CM

## 2025-05-29 DIAGNOSIS — D18.01 CHERRY ANGIOMA: ICD-10-CM

## 2025-05-29 DIAGNOSIS — L81.4 LENTIGINES: ICD-10-CM

## 2025-05-29 DIAGNOSIS — L57.0 MULTIPLE ACTINIC KERATOSES: ICD-10-CM

## 2025-05-29 DIAGNOSIS — Z85.828 HISTORY OF NONMELANOMA SKIN CANCER: ICD-10-CM

## 2025-05-29 DIAGNOSIS — L82.1 SEBORRHEIC KERATOSES: Primary | ICD-10-CM

## 2025-05-29 PROCEDURE — 17004 DESTROY PREMAL LESIONS 15/>: CPT | Performed by: STUDENT IN AN ORGANIZED HEALTH CARE EDUCATION/TRAINING PROGRAM

## 2025-05-29 PROCEDURE — 99214 OFFICE O/P EST MOD 30 MIN: CPT | Performed by: STUDENT IN AN ORGANIZED HEALTH CARE EDUCATION/TRAINING PROGRAM

## 2025-05-29 NOTE — PROGRESS NOTES
Established patient     Referred by:   Wade Hernandez DO [NPI: [id number]]     CHIEF COMPLAINT: FBSE    HISTORY OF PRESENT ILLNESS: .    - No particular lesions of concern.       DERM HISTORY:  History of skin cancer: Yes: BCC-left forearm proximal, left forearm distal  History of melanoma: No    FAMILY HISTORY:  History of melanoma: Yes, Father and Brothers    PAST MEDICAL HISTORY:  Past Medical History[1]    REVIEW OF SYSTEMS:  Constitutional: Denies fever, chills, unintentional weight loss.   Skin as per HPI    Medications  Current Medications[2]    PHYSICAL EXAM:  Patient declined chaperone   General: awake, alert, no acute distress  Skin: Skin exam was performed today including the following: head and face, scalp, neck, chest (including breasts and axillae), abdomen, back, bilateral upper extremities, bilateral lower extremities, hands, feet, digits, nails. Pertinent findings include:   - Scattered bright red-purple dome-shaped papules on the trunk and extremities   - Scattered light brown stellate macules on sun exposed sites  - Scattered, evenly colored, round brown macules and papules with regular borders on the trunk and extremities  - Numerous scattered skin-colored and brown, waxy, stuck-on papules and plaques on the trunk and extremities  - L cheek, L temple with pink gritty papules   - Frontal scalp with pink gritty papules     ASSESSMENT & PLAN:  Pathophysiology of diagnoses discussed with patient.  Therapeutic options reviewed. Risks, benefits, and alternatives discussed with patient. Instructions reviewed at length.    #Lentigines  #Seborrheic keratoses   #Cherry angiomas   - Reassurance provided regarding the benign nature of these lesions.    #Multiple benign nevi  - Complete skin exam performed today with no outlier lesions identified   - Reassured patient of benign nature of these lesions.   - Recommend daily photoprotection with broad-spectrum sunscreen, avoidance of sun during peak hours,  and sun protective clothing.    - Dermoscopy was used for physical examination of pigmented lesions during today's office visit.    #History of nonmelanoma skin cancer- Basal Cell Carcinoma L prox and distal forearm   - No evidence of recurrence on exam. Continued monitoring. Regular skin exams discussed given increased risk of subsequent cutaneous malignancies.   - Should any areas change in size, shape or color, bleed or become tender, the patient will contact the office for evaluation sooner than their interval appointment.    #Multiple actinic keratoses  - Discussed premalignant etiology and possibility of transformation to SCC  - Recommended cryotherapy today   - Discussed side effects including redness, swelling, crusting, and discolortion after treatment, wound care with soap/water and vaseline   - Recommend sun protection with spf 30 or higher, sun protective clothing such as wide brimmed hats and long sleeves. Recommend avoiding midday sun (10 am- 3 pm).     - Procedure Note Cryosurgery of pre-malignant lesion(s)  Risks, benefits, alternatives, complications, and personnel required for cryosurgery reviewed with patient. Patient verbalizes understanding and wishes to proceed.   - Cryosurgery performed with Liquid Nitrogen via cryostat spray gun to Actinic Keratosis . 16 lesion(s) treated.   - Patient tolerated well and wound care discussed. Return if lesions fail to fully resolve.      Return to clinic: 1 year  or sooner if something concerning arises     Sabas Holt MD    By signing my name below, I, Sharon DAINEL MA,  attest that this documentation has been prepared under the direction and in the presence of Sabas Holt MD.   Electronically Signed: Sharon DANIEL MA, 5/29/2025, 3:03 PM.             [1]   Past Medical History:   Arrhythmia    Svt    BCC (basal cell carcinoma)    left forearm proximal    BCC (basal cell carcinoma)    left forearm distal    Hearing impaired person, bilateral    High blood  pressure    High cholesterol    Obesity    Other and unspecified hyperlipidemia    Sleep apnea    not using machine    SVT (supraventricular tachycardia) (HCC)    Unspecified essential hypertension   [2]   Current Outpatient Medications   Medication Sig Dispense Refill    methylPREDNISolone (MEDROL) 4 MG Oral Tablet Therapy Pack As directed. 1 each 1    methylPREDNISolone (MEDROL) 4 MG Oral Tablet Therapy Pack Dosepack: take as directed 1 each 0    albuterol 108 (90 Base) MCG/ACT Inhalation Aero Soln Inhale 2 puffs into the lungs every 4 (four) hours as needed for Wheezing.      mupirocin 2 % External Ointment Apply to the naris 2 times daily for 5 days. 1 each 1    REPATHA SURECLICK 140 MG/ML Subcutaneous Solution Auto-injector INJECT 140MG SUBCUTANEOUSLY ONCE EVERY 2 WEEKS.      telmisartan 80 MG Oral Tab Take 1 tablet (80 mg total) by mouth daily. 90 tablet 3

## 2025-06-27 ENCOUNTER — LAB ENCOUNTER (OUTPATIENT)
Dept: LAB | Facility: HOSPITAL | Age: 70
End: 2025-06-27
Attending: INTERNAL MEDICINE
Payer: COMMERCIAL

## 2025-06-27 DIAGNOSIS — E78.00 HYPERCHOLESTEROLEMIA: Primary | ICD-10-CM

## 2025-06-27 LAB
CHOLEST SERPL-MCNC: 133 MG/DL (ref ?–200)
FASTING PATIENT LIPID ANSWER: YES
HDLC SERPL-MCNC: 38 MG/DL (ref 40–59)
LDLC SERPL CALC-MCNC: 72 MG/DL (ref ?–100)
NONHDLC SERPL-MCNC: 95 MG/DL (ref ?–130)
TRIGL SERPL-MCNC: 129 MG/DL (ref 30–149)
VLDLC SERPL CALC-MCNC: 20 MG/DL (ref 0–30)

## 2025-06-27 PROCEDURE — 36415 COLL VENOUS BLD VENIPUNCTURE: CPT

## 2025-06-27 PROCEDURE — 80061 LIPID PANEL: CPT

## 2025-08-07 DIAGNOSIS — Z00.00 ANNUAL PHYSICAL EXAM: ICD-10-CM

## 2025-08-08 RX ORDER — TELMISARTAN 80 MG/1
80 TABLET ORAL DAILY
Qty: 90 TABLET | Refills: 3 | Status: SHIPPED | OUTPATIENT
Start: 2025-08-08

## 2025-08-21 ENCOUNTER — TELEPHONE (OUTPATIENT)
Dept: AUDIOLOGY | Facility: CLINIC | Age: 70
End: 2025-08-21

## (undated) DIAGNOSIS — Z00.00 ANNUAL PHYSICAL EXAM: ICD-10-CM

## (undated) DEVICE — Device

## (undated) DEVICE — WAX BNE 2.5GM BEESWAX PAR AND ISO PALMITATE

## (undated) DEVICE — ELECTRODE ESURG 2.75IN EZ CLN

## (undated) DEVICE — HEAD & NECK: Brand: MEDLINE INDUSTRIES, INC.

## (undated) DEVICE — SOLUTION IRRIG 3000ML 0.9% NACL FLX CONT

## (undated) DEVICE — SCREW FIX 3.5X48MM HEX HD

## (undated) DEVICE — SHEET,DRAPE,53X77,STERILE: Brand: MEDLINE

## (undated) DEVICE — CODMAN® SURGICAL PATTIES 1/2" X 3" (1.27CM X 7.62CM): Brand: CODMAN®

## (undated) DEVICE — GAMMEX® PI HYBRID SIZE 6, STERILE POWDER-FREE SURGICAL GLOVE, POLYISOPRENE AND NEOPRENE BLEND: Brand: GAMMEX

## (undated) DEVICE — MICRO KOVER: Brand: UNBRANDED

## (undated) DEVICE — Device: Brand: STABLECUT®

## (undated) DEVICE — PROXIMATE SKIN STAPLERS (35 WIDE) CONTAINS 35 STAINLESS STEEL STAPLES (FIXED HEAD): Brand: PROXIMATE

## (undated) DEVICE — GAMMEX® PI HYBRID SIZE 8, STERILE POWDER-FREE SURGICAL GLOVE, POLYISOPRENE AND NEOPRENE BLEND: Brand: GAMMEX

## (undated) DEVICE — STANDARD HYPODERMIC NEEDLE,POLYPROPYLENE HUB: Brand: MONOJECT

## (undated) DEVICE — V2 SPECIMEN COLLECTION TRAY: Brand: NEPTUNE

## (undated) DEVICE — SKIN PREP TRAY 4 COMPARTM TRAY: Brand: MEDLINE INDUSTRIES, INC.

## (undated) DEVICE — SUT VICRYL 4-0 SH-1 J218H

## (undated) DEVICE — MEDI-VAC NON-CONDUCTIVE SUCTION TUBING 6MM X 1.8M (6FT.) L: Brand: CARDINAL HEALTH

## (undated) DEVICE — HOOD, PEEL-AWAY: Brand: FLYTE

## (undated) DEVICE — THREADED PINS PACK: Brand: KNEE INSTRUMENTS

## (undated) DEVICE — SYRINGE, LUER SLIP, STERILE, 60ML: Brand: MEDLINE

## (undated) DEVICE — WRAP COOLING KNEE W/ICE PILLOW

## (undated) DEVICE — STERILE COTTON BALLS LARGE 5/P: Brand: MEDLINE

## (undated) DEVICE — COTTON ROLL: Brand: DEROYAL

## (undated) DEVICE — PIN DRL 75MM HDLSS TRCR NXGN

## (undated) DEVICE — LASSO POLYPECTOMY SNARE: Brand: LASSO

## (undated) DEVICE — CO2 CANNULA,SSOFT,ADLT,7O2,4CO2,FEMALE: Brand: MEDLINE

## (undated) DEVICE — SOL NACL IRRIG 0.9% 1000ML BTL

## (undated) DEVICE — SHORT THREADED PINS PACK: Brand: KNEE INSTRUMENTS

## (undated) DEVICE — SUT PLAIN GUT 5-0 PC-1 1915G

## (undated) DEVICE — BANDAGE COMPR W6INXL11YD WVN COT/E CLP CLSR

## (undated) DEVICE — ANTIBACTERIAL UNDYED BRAIDED (POLYGLACTIN 910), SYNTHETIC ABSORBABLE SUTURE: Brand: COATED VICRYL

## (undated) DEVICE — KIT CLEAN ENDOKIT 1.1OZ GOWNX2

## (undated) DEVICE — DISPOSABLE TOURNIQUET CUFF SINGLE BLADDER, DUAL PORT AND QUICK CONNECT CONNECTOR: Brand: COLOR CUFF

## (undated) DEVICE — 1 ML INSULIN SYRINGE,LUER-LOCK WITH TIP CAP: Brand: MONOJECT

## (undated) DEVICE — SUCTION CANISTER, 3000CC,SAFELINER: Brand: DEROYAL

## (undated) DEVICE — GAMMEX® PI HYBRID SIZE 6.5, STERILE POWDER-FREE SURGICAL GLOVE, POLYISOPRENE AND NEOPRENE BLEND: Brand: GAMMEX

## (undated) DEVICE — TRAY CATH FOLEY 16FR INCLUDE BARDX IC COMPLT CARE

## (undated) DEVICE — SUT ETHBND XL 1 18IN CTX NABSRB GRN CR 48MM

## (undated) DEVICE — SOLUTION ANTIFOG W/ SPONGE

## (undated) DEVICE — PACK CDS TOTAL KNEE

## (undated) DEVICE — SLIM BODY SKIN STAPLER: Brand: APPOSE ULC

## (undated) DEVICE — HOOD: Brand: FLYTE

## (undated) DEVICE — CONTAINER,SPECIMEN,OR STERILE,4OZ: Brand: MEDLINE

## (undated) DEVICE — GIJAW SINGLE-USE BIOPSY FORCEPS WITH NEEDLE: Brand: GIJAW

## (undated) DEVICE — SPNG ABS THK10MM 100 12.5X8CM

## (undated) DEVICE — ISLAND DRESSING 4IN X 10IN: Brand: SILVERLON ANTIMICROBIAL WOUND DRESSING

## (undated) DEVICE — SOLUTION IRRIG 1000ML 0.9% NACL USP BTL

## (undated) DEVICE — KIT ENDO ORCAPOD 160/180/190

## (undated) DEVICE — APPLICATOR PREP 26ML CHG 2% ISO ALC 70%

## (undated) DEVICE — ENCORE® LATEX MICRO SIZE 8, STERILE LATEX POWDER-FREE SURGICAL GLOVE: Brand: ENCORE

## (undated) DEVICE — #1020 STERI DRAPE 41MM X 41MM SMALL: Brand: STERI-DRAPE™

## (undated) DEVICE — CASED DISP BIPOLAR CORD

## (undated) DEVICE — SCREW ORTH 2.5X25MM FEM HEX PERSONA

## (undated) DEVICE — CEMENT MIXING SYSTEM WITH FEMORAL BREAKWAY NOZZLE: Brand: REVOLUTION

## (undated) DEVICE — 3M™ COBAN™ NL STERILE NON-LATEX SELF-ADHERENT WRAP, 2084S, 4 IN X 5 YD (10 CM X 4,5 M), 18 ROLLS/CASE: Brand: 3M™ COBAN™

## (undated) DEVICE — SPONGE 4X4 10PK

## (undated) DEVICE — MEDI-VAC NON-CONDUCTIVE SUCTION TUBING: Brand: CARDINAL HEALTH

## (undated) NOTE — LETTER
[facility], [place]  Authorization for Invasive Procedures  1. I hereby authorize Dr.C. Tennille Ford, my physician and whomever may be designated as the doctor's assistant, to perform the following operation and/or procedure:  [employer] performed for the purposes of advancing medicine, science, and/or education, provided my identity is not revealed. If the procedure has been videotaped, the physician/surgeon will obtain the original videotape.  The hospital will not be responsible for stor My signature below affirms that prior to the time of the procedure, I have explained to the patient and/or his legal representative, the risks and benefits involved in the proposed treatment and any reasonable alternative to the proposed treatment.  I have

## (undated) NOTE — ED AVS SNAPSHOT
[facility] in [address] [address] Damian Castillo [facility] [address]    Phone:  [phone]    Fax:  [phone]           Mr. Bianca Adame   MRN: [id number]    Department:  [facility] in [address] [place] [address]   Date of Visit: not participate in your health insurance plan. This may result in a lower benefit level being available to you or other limited reimbursement.   The physician may seek payment directly from you for amounts other than your deductible, co-payment, or co-insu prescription right away and begin taking the medication(s) as directed.   If you believe that any of the medications or instructions on this list is different from what your Primary Care doctor has instructed you - please continue to take your medications a - If you don’t have insurance, [facility] has partnered with [facility] [address] to help you get signed up for insurance coverage.   [facility] [employer] is a Federal Navigator program that can help with your Affordable Care Act cover

## (undated) NOTE — LETTER
07/11/20        Shola Simeon  [address] [facility]      Dear Jayne Marshall records indicate that you have outstanding lab work and or testing that was ordered for you and has not yet been completed:  Orders Placed This Encounter

## (undated) NOTE — MR AVS SNAPSHOT
[address]  [phone]               Thank you for choosing us for your health care visit with Romero Fairchild MD.  We are glad to serve you and happy to provide you with this summar automatic breaths to maintain breathing if you stop breathing while sleeping. · An autoCPAP device automatically adjusts pressure throughout the night and in response to changes such as body position, sleep stage, and snoring.   Date Last Reviewed: 8/10/20

## (undated) NOTE — MR AVS SNAPSHOT
[facility]  [address] [place] [address], [address] [place] [address]               Thank you for choosing us for your health care visit with Danae Gonzales. DO Mary.   We are glad to serve you and happy to provide you with this summary your appointment immediately. However, if you are unsure about the requirements for authorization, please wait 5-7 days and then contact your physician's office.  At that time, you will be provided with any authorization numbers or be assured that none are

## (undated) NOTE — LETTER
1/8/2021              [address] [facility]         Dear Sofía Rogers,    Please advise your employer that you are no longer contagious for [address]. Sincerely,      Gerson Jimenez.  Mary,   [facility]

## (undated) NOTE — LETTER
03/11/20      Ton Chase  [address] [facility]      Dear Ruth Gomez records indicate that you have outstanding lab work and or testing that was ordered for you and has not yet been completed:  Orders Placed This Encounter      Kamila Wilburn

## (undated) NOTE — LETTER
AUTHORIZATION FOR SURGICAL OPERATION OR OTHER PROCEDURE    1. I hereby authorize Dr. Rosales Hickman, and [facility] [facility] staff assigned to my case to perform the following operation and/or procedure at the [facility]:    Myringotomy of left ear    2. My physician has explained the nature and purpose of the operation or other procedure, possible alternative methods of treatment, the risks involved, and the possibility of complication to me. I acknowledge that no guarantee has been made as to the result that may be obtained. 3.  I recognize that, during the course of this operation, or other procedure, unforseen conditions may necessitate additional or different procedure than those listed above. I, therefore, further authorize and request that the above named physician, his/her physician assistants or designees perform such procedures as are, in his/her professional opinion, necessary and desirable. 4.  Any tissue or organs removed in the operation or other procedure may be disposed of by and at the discretion of the [facility] and [facility] AT [facility]. 5.  I understand that in the event of a medical emergency, I will be transported by local paramedics to [facility] or other [facility] emergency department. 6.  I certify that I have read and fully understand the above consent to operation and/or other procedure. 7.  I acknowledge that my physician has explained sedation/analgesia administration to me including the risks and benefits. I consent to the administration of sedation/analgesia as may be necessary or desirable in the judgement of my physician. Witness signature: ___________________________________________________ Date:  ______/______/_____                    Time:  ________ A. M.  P.M.        Patient Name:  ______________________________________________________  (please print)      Patient signature: ___________________________________________________             Relationship to Patient:           []  Parent    Responsible person                          []  Spouse  In case of minor or                    [] Other  _____________   Incompetent name:  __________________________________________________                               (please print)      _____________      Responsible person  In case of minor or  Incompetent signature:  _______________________________________________    Statement of Physician  My signature below affirms that prior to the time of the procedure, I have explained to the patient and/or his/her guardian, the risks and benefits involved in the proposed treatment and any reasonable alternative to the proposed treatment. I have also explained the risks and benefits involved in the refusal of the proposed treatment and have answered the patient's questions.                         Date:  ______/______/_______  Provider                      Signature:  __________________________________________________________       Time:  ___________ A.M    P.M.

## (undated) NOTE — ED AVS SNAPSHOT
[facility] in [address] [facility] [address] [place] [address]    Phone:  [phone]    Fax:  [phone]           Mr. Yonny Muñiz   MRN: [id number]    Department:  [facility] in [address] [place] [address]   Date of Visit: physician may seek payment directly from you for amounts other than your deductible, co-payment, or co-insurance and for other services not covered under your health insurance plan.   Please contact your insurance company and physician's office to determine different from what your Primary Care doctor has instructed you - please continue to take your medications as instructed by your Primary Care doctor until you can check with your doctor. Please bring the medication list to your next doctor's appointment. coverage. Patient [address] is a Federal Navigator program that can help with your Affordable Care Act coverage, as well as all types of Medicaid plans.   To get signed up and covered, please call [phone] and ask to get set up for an insuran

## (undated) NOTE — MR AVS SNAPSHOT
[facility]  [address] [place] [address], [address] [facility]  [address] [place] [place]               Thank you for choosing us for your health care visit with Lucia Mcleod. DO Mary.   We are glad to serve you and happy to provide you with this summary Take 1 capsule (75 mg total) by mouth 2 (two) times daily with meals. Commonly known as:  INDOCIN SR           Omeprazole 40 MG Cpdr   Take 1 capsule (40 mg total) by mouth daily.            phenyleph-promethazine-cod 5-6.25-10 MG/5ML Syrp   Take 5 mL by

## (undated) NOTE — LETTER
AUTHORIZATION FOR SURGICAL OPERATION OR OTHER PROCEDURE    1. I hereby authorize Dr. Wade Hernandez, and [facility] staff assigned to my case to perform the following operation and/or procedure at the [facility] site:    _______________________________________________________________________________________________    Right knee-Corticoid steroid injection and aspiration  _______________________________________________________________________________________________    2.  My physician has explained the nature and purpose of the operation or other procedure, possible alternative methods of treatment, the risks involved, and the possibility of complication to me.  I acknowledge that no guarantee has been made as to the result that may be obtained.  3.  I recognize that, during the course of this operation, or other procedure, unforseen conditions may necessitate additional or different procedure than those listed above.  I, therefore, further authorize and request that the above named physician, his/her physician assistants or designees perform such procedures as are, in his/her professional opinion, necessary and desirable.  4.  Any tissue or organs removed in the operation or other procedure may be disposed of by and at the discretion of the [facility] and [facility].  5.  I understand that in the event of a medical emergency, I will be transported by local paramedics to [facility] or other hospital emergency department.  6.  I certify that I have read and fully understand the above consent to operation and/or other procedure.    7.  I acknowledge that my physician has explained sedation/analgesia administration to me including the risks and benefits.  I consent to the administration of sedation/analgesia as may be necessary or desirable in the judgement of my physician.    Witness signature: ___________________________________________________ Date:   ______/______/_____                    Time:  ________ A.M.  P.M.       Patient Name:  ______________________________________________________  (please print)      Patient signature:  ___________________________________________________             Relationship to Patient:           []  Parent    Responsible person                          []  Spouse  In case of minor or                    [] Other  _____________   Incompetent name:  __________________________________________________                               (please print)      _____________      Responsible person  In case of minor or  Incompetent signature:  _______________________________________________    Statement of Physician  My signature below affirms that prior to the time of the procedure, I have explained to the patient and/or his/her guardian, the risks and benefits involved in the proposed treatment and any reasonable alternative to the proposed treatment.  I have also explained the risks and benefits involved in the refusal of the proposed treatment and have answered the patient's questions.                        Date:  ______/______/_______  Provider                      Signature:  __________________________________________________________       Time:  ___________ A.M    P.M.

## (undated) NOTE — LETTER
[facility]  Administration of Anesthesia  I, Lanre Melchor agree to be cared for by a physician anesthesiologist alone and/or with a nurse anesthetist, who is specially trained to monitor me and give me medicine to put me to sleep or keep me comfortable during my procedure    I understand that my anesthesiologist and/or anesthetist is not an employee or agent of [facility] or [employer]. He or she works for [facility]    As the patient asking for anesthesia services, I agree to:  Allow the anesthesiologist (anesthesia doctor) to give me medicine and do additional procedures as necessary. Some examples are: Starting or using an “IV” to give me medicine, fluids or blood during my procedure, and having a breathing tube placed to help me breathe when I’m asleep (intubation). In the event that my heart stops working properly, I understand that my anesthesiologist will make every effort to sustain my life, unless otherwise directed by [facility] Do Not Resuscitate documents.  Tell my anesthesia doctor before my procedure:  If I am pregnant.  The last time that I ate or drank.  iii. All of the medicines I take (including prescriptions, herbal supplements, and pills I can buy without a prescription (including street drugs/illegal medications). Failure to inform my anesthesiologist about these medicines may increase my risk of anesthetic complications.  iv.If I am allergic to anything or have had a reaction to anesthesia before.  I understand how the anesthesia medicine will help me (benefits).  I understand that with any type of anesthesia medicine there are risks:  The most common risks are: nausea, vomiting, sore throat, muscle soreness, damage to my eyes, mouth, or teeth (from breathing tube placement).  Rare risks include: remembering what happened during my procedure, allergic reactions to medications, injury to my airway, heart, lungs, vision, nerves,  or muscles and in extremely rare instances death.  My doctor has explained to me other choices available to me for my care (alternatives).  Pregnant Patients (“epidural”):  I understand that the risks of having an epidural (medicine given into my back to help control pain during labor), include itching, low blood pressure, difficulty urinating, headache or slowing of the baby’s heart. Very rare risks include infection, bleeding, seizure, irregular heart rhythms and nerve injury.  Regional Anesthesia (“spinal”, “epidural”, & “nerve blocks”):  I understand that rare but potential complications include headache, bleeding, infection, seizure, irregular heart rhythms, and nerve injury.    _____________________________________________________________________________  Patient (or Representative) Signature/Relationship to Patient  Date   Time    _____________________________________________________________________________   Name (if used)    Language/Organization   Time    _____________________________________________________________________________  Nurse Anesthetist Signature     Date   Time  _____________________________________________________________________________  Anesthesiologist Signature     Date   Time  I have discussed the procedure and information above with the patient (or patient’s representative) and answered their questions. The patient or their representative has agreed to have anesthesia services.    _____________________________________________________________________________  Witness        Date   Time  I have verified that the signature is that of the patient or patient’s representative, and that it was signed before the procedure  Patient Name: Lanre Melchor     : 1955                 Printed: 2024 at 4:58 PM    Medical Record #: [id number]                                            Page 1 of 1  ----------ANESTHESIA CONSENT----------

## (undated) NOTE — IP AVS SNAPSHOT
[address] [facility] [phone]                Discharge Summary   2/23/2017    Mr. Ara Granda           Admission Information        Provider Department    2/23/2017 Samantha Arellano MD [facility] legal documents today      Medications     · Take acetaminophen if needed for pain. Do not exceed 4000 mg of acetaminophen in a 24-hour period.       [ ] If the box is checked, do not take blood thinners, aspirin or Plavix for 24 hours    · Changes to your (12/23/16)  61 (12/23/16)  30 (12/23/16)  7 (12/23/16)  2 (12/23/16)  1  (12/23/16)  7.8 (H) (12/23/16)  3.8 (12/23/16)  0.9 (12/23/16)  0.2 (12/23/16)  0.1      Metabolic Lab Results  (Last result in the past 90 days)    HgbA1C Glucose BUN Creatinine Jaron Call [phone] for help. [id number] is NOT to be used for urgent needs.   For medical emergencies, dial 911.             _____________________________________________________________________________    Medication Side Effects - Medications to be taken at

## (undated) NOTE — LETTER
[facility]  [address], [place], [place]    Authorization for Surgical Operation and Procedure                               I hereby authorize Kristan Tovar MD, my physician and his/her assistants (if applicable), which may include medical students, residents, and/or fellows, to perform the following surgical operation/ procedure and administer such anesthesia as may be determined necessary by my physician: Operation/Procedure name (s) COLONOSCOPY on Lanre Melchor   2.   I recognize that during the surgical operation/procedure, unforeseen conditions may necessitate additional or different procedures than those listed above.  I, therefore, further authorize and request that the above-named surgeon, assistants, or designees perform such procedures as are, in their judgment, necessary and desirable.    3.   My surgeon/physician has discussed prior to my surgery the potential benefits, risks and side effects of this procedure; the likelihood of achieving goals; and potential problems that might occur during recuperation.  They also discussed reasonable alternatives to the procedure, including risks, benefits, and side effects related to the alternatives and risks related to not receiving this procedure.  I have had all my questions answered and I acknowledge that no guarantee has been made as to the result that may be obtained.    4.   Should the need arise during my operation/procedure, which includes change of level of care prior to discharge, I also consent to the administration of blood and/or blood products.  Further, I understand that despite careful testing and screening of blood or blood products by collecting agencies, I may still be subject to ill effects as a result of receiving a blood transfusion and/or blood products.  The following are some, but not all, of the potential risks that can occur: fever and allergic reactions, hemolytic reactions, transmission of diseases such  as Hepatitis, AIDS and Cytomegalovirus (CMV) and fluid overload.  In the event that I wish to have an autologous transfusion of my own blood, or a directed donor transfusion, I will discuss this with my physician.  Check only if Refusing Blood or Blood Products  I understand refusal of blood or blood products as deemed necessary by my physician may have serious consequences to my condition to include possible death. I hereby assume responsibility for my refusal and release the hospital, its personnel, and my physicians from any responsibility for the consequences of my refusal.    o  Refuse   5.   I authorize the use of any specimen, organs, tissues, body parts or foreign objects that may be removed from my body during the operation/procedure for diagnosis, research or teaching purposes and their subsequent disposal by hospital authorities.  I also authorize the release of specimen test results and/or written reports to my treating physician on the hospital medical staff or other referring or consulting physicians involved in my care, at the discretion of the Pathologist or my treating physician.    6.   I consent to the photographing or videotaping of the operations or procedures to be performed, including appropriate portions of my body for medical, scientific, or educational purposes, provided my identity is not revealed by the pictures or by descriptive texts accompanying them.  If the procedure has been photographed/videotaped, the surgeon will obtain the original picture, image, videotape or CD.  The hospital will not be responsible for storage, release or maintenance of the picture, image, tape or CD.    7.   I consent to the presence of a  or observers in the operating room as deemed necessary by my physician or their designees.    8.   I recognize that in the event my procedure results in extended X-Ray/fluoroscopy time, I may develop a skin reaction.    9. If I have a Do Not Attempt  Resuscitation (DNAR) order in place, that status will be suspended while in the operating room, procedural suite, and during the recovery period unless otherwise explicitly stated by me (or a person authorized to consent on my behalf). The surgeon or my attending physician will determine when the applicable recovery period ends for purposes of reinstating the DNAR order.  10. Patients having a sterilization procedure: I understand that if the procedure is successful the results will be permanent and it will therefore be impossible for me to inseminate, conceive, or bear children.  I also understand that the procedure is intended to result in sterility, although the result has not been guaranteed.   11. I acknowledge that my physician has explained sedation/analgesia administration to me including the risk and benefits I consent to the administration of sedation/analgesia as may be necessary or desirable in the judgment of my physician.    I CERTIFY THAT I HAVE READ AND FULLY UNDERSTAND THE ABOVE CONSENT TO OPERATION and/or OTHER PROCEDURE.     ____________________________________  _________________________________        ______________________________  Signature of Patient    Signature of Responsible Person                Printed Name of Responsible Person                                      ____________________________________  _____________________________                ________________________________  Signature of Witness        Date  Time         Relationship to Patient    STATEMENT OF PHYSICIAN My signature below affirms that prior to the time of the procedure; I have explained to the patient and/or his/her legal representative, the risks and benefits involved in the proposed treatment and any reasonable alternative to the proposed treatment. I have also explained the risks and benefits involved in refusal of the proposed treatment and alternatives to the proposed treatment and have answered the patient's  questions. If I have a significant financial interest in a co-management agreement or a significant financial interest in any product or implant, or other significant relationship used in this procedure/surgery, I have disclosed this and had a discussion with my patient.     _____________________________________________________              _____________________________  (Signature of Physician)                                                                                         (Date)                                   (Time)  Patient Name: Lanre Melchor      : 1955      Printed: 2024     Medical Record #: [id number]                                      Page 1 of 1

## (undated) NOTE — Clinical Note
[facility]ig[facility] Avalos [address]       01/10/2017        Patient: Julita Bennett   YOB: 1955   Date of Visit: 1/9/2017       Dear  Dr. Blair Lujan,      Thank you for referring Julita Bennett to my pr

## (undated) NOTE — LETTER
08/27/20        [facility]  [address]      Dear Guerrero Marroquin records indicate that you have outstanding lab work and or testing that was ordered for you and has not yet been completed:  Orders Placed This Encounter

## (undated) NOTE — LETTER
AUTHORIZATION FOR SURGICAL OPERATION OR OTHER PROCEDURE    1.  I hereby authorize Dr. Claudia Hernández, and [facility] staff assigned to my case to perform the following operation and/or procedure at the [facility]:  Aspiration and cortisone inj Time:  ________ A. M.  P.M.        Patient Name:  ______________________________________________________  (please print)      Patient signature:  ___________________________________________________             Relationship to Patient: